# Patient Record
Sex: FEMALE | Race: BLACK OR AFRICAN AMERICAN | NOT HISPANIC OR LATINO | Employment: UNEMPLOYED | URBAN - METROPOLITAN AREA
[De-identification: names, ages, dates, MRNs, and addresses within clinical notes are randomized per-mention and may not be internally consistent; named-entity substitution may affect disease eponyms.]

---

## 2017-06-12 ENCOUNTER — OFFICE VISIT (OUTPATIENT)
Dept: INTERNAL MEDICINE | Facility: CLINIC | Age: 57
End: 2017-06-12

## 2017-06-12 VITALS
SYSTOLIC BLOOD PRESSURE: 124 MMHG | WEIGHT: 182 LBS | OXYGEN SATURATION: 100 % | HEART RATE: 81 BPM | DIASTOLIC BLOOD PRESSURE: 86 MMHG | RESPIRATION RATE: 20 BRPM | HEIGHT: 61 IN | BODY MASS INDEX: 34.36 KG/M2

## 2017-06-12 DIAGNOSIS — G89.29 CHRONIC EPIGASTRIC PAIN: ICD-10-CM

## 2017-06-12 DIAGNOSIS — N17.9 ACUTE KIDNEY INJURY (HCC): Primary | ICD-10-CM

## 2017-06-12 DIAGNOSIS — R10.13 CHRONIC EPIGASTRIC PAIN: ICD-10-CM

## 2017-06-12 PROBLEM — E11.9 TYPE 2 DIABETES MELLITUS (HCC): Status: ACTIVE | Noted: 2017-06-12

## 2017-06-12 PROBLEM — I25.10 CORONARY ARTERY DISEASE: Status: ACTIVE | Noted: 2017-06-12

## 2017-06-12 LAB
ALBUMIN SERPL-MCNC: 4.6 G/DL (ref 3.2–4.8)
ALBUMIN/GLOB SERPL: 1.7 G/DL (ref 1.5–2.5)
ALP SERPL-CCNC: 76 U/L (ref 25–100)
ALT SERPL W P-5'-P-CCNC: 33 U/L (ref 7–40)
ANION GAP SERPL CALCULATED.3IONS-SCNC: 6 MMOL/L (ref 3–11)
AST SERPL-CCNC: 28 U/L (ref 0–33)
BILIRUB SERPL-MCNC: 0.3 MG/DL (ref 0.3–1.2)
BUN BLD-MCNC: 17 MG/DL (ref 9–23)
BUN/CREAT SERPL: 18.9 (ref 7–25)
CALCIUM SPEC-SCNC: 10.7 MG/DL (ref 8.7–10.4)
CHLORIDE SERPL-SCNC: 107 MMOL/L (ref 99–109)
CO2 SERPL-SCNC: 29 MMOL/L (ref 20–31)
CREAT BLD-MCNC: 0.9 MG/DL (ref 0.6–1.3)
GFR SERPL CREATININE-BSD FRML MDRD: 78 ML/MIN/1.73
GLOBULIN UR ELPH-MCNC: 2.7 GM/DL
GLUCOSE BLD-MCNC: 57 MG/DL (ref 70–100)
LIPASE SERPL-CCNC: 41 U/L (ref 6–51)
POTASSIUM BLD-SCNC: 4.3 MMOL/L (ref 3.5–5.5)
PROT SERPL-MCNC: 7.3 G/DL (ref 5.7–8.2)
SODIUM BLD-SCNC: 142 MMOL/L (ref 132–146)

## 2017-06-12 PROCEDURE — 83690 ASSAY OF LIPASE: CPT | Performed by: FAMILY MEDICINE

## 2017-06-12 PROCEDURE — 80053 COMPREHEN METABOLIC PANEL: CPT | Performed by: FAMILY MEDICINE

## 2017-06-12 PROCEDURE — 99204 OFFICE O/P NEW MOD 45 MIN: CPT | Performed by: FAMILY MEDICINE

## 2017-06-12 RX ORDER — ISOSORBIDE MONONITRATE 30 MG/1
30 TABLET, EXTENDED RELEASE ORAL DAILY
COMMUNITY
End: 2018-02-26 | Stop reason: DRUGHIGH

## 2017-06-12 RX ORDER — INSULIN LISPRO 100 [IU]/ML
INJECTION, SUSPENSION SUBCUTANEOUS
Refills: 5 | COMMUNITY
Start: 2017-04-20 | End: 2018-06-15 | Stop reason: SDUPTHER

## 2017-06-12 RX ORDER — PANTOPRAZOLE SODIUM 40 MG/1
40 TABLET, DELAYED RELEASE ORAL DAILY
Qty: 30 TABLET | Refills: 0 | Status: SHIPPED | OUTPATIENT
Start: 2017-06-12 | End: 2017-07-10 | Stop reason: SDUPTHER

## 2017-06-12 RX ORDER — ROSUVASTATIN CALCIUM 40 MG/1
TABLET, COATED ORAL
Refills: 3 | COMMUNITY
Start: 2017-05-16 | End: 2018-02-26

## 2017-06-12 RX ORDER — PANTOPRAZOLE SODIUM 40 MG/1
40 TABLET, DELAYED RELEASE ORAL DAILY
COMMUNITY
End: 2017-06-12 | Stop reason: SDUPTHER

## 2017-06-12 RX ORDER — SENNA AND DOCUSATE SODIUM 50; 8.6 MG/1; MG/1
1 TABLET, FILM COATED ORAL DAILY
COMMUNITY
End: 2017-06-12

## 2017-06-12 RX ORDER — CLOPIDOGREL BISULFATE 75 MG/1
TABLET ORAL
Refills: 2 | COMMUNITY
Start: 2017-05-16 | End: 2018-09-21 | Stop reason: SDUPTHER

## 2017-06-12 NOTE — PATIENT INSTRUCTIONS
It was nice meeting you today!  I look forward to getting to know you and helping you with your primary care needs.  I have ordered a gastroenterology referral to further evaluation your abdominal pain.  You will be called to set up an appointment with this specialist.  I have also ordered an abdominal ultrasound for you to evaluate your gallbladder and other organs.  You will be called to set up an appointment for this study.  Recommend that you restart Pantoprazole to see if this helps your abdominal pain.  Your medication has been electronically prescribed and will be at your pharmacy.  Please pick this up and take as directed.  Please keep any upcoming appointments that you may have with your endocrinologist and cardiologist.  Recommend that you sign up for My Chart (our patient portal).  You will be able to access lab results, request appointments and send our office messages.  If you are interested, please ask for My Chart access information at the check out desk.  Please schedule an appointment for your annual physical exam (if you have not already had one for this year) at your earliest convenience.  Please follow-up as indicated.  Return to the clinic sooner if your symptoms worsen or if you have any other concerns.

## 2017-06-12 NOTE — PROGRESS NOTES
Subjective   Jayna Ambrocio is a 56 y.o. female who presents to the clinic to Establish Care    History of Present Illness  Denies having a recent PCP.  Specialists include: Dr. Naik (cardiologist at ), Dr. Jeannette Hall (endocrinologist at )  Prescription medications include: Aspirin, Plavix, Isosorbide mononitrate, Crestor, Humalog 75/25 (15 units BID)  OTC medications include: None    Patient presents to establish care and to follow-up after hospitalization at  from 04/28/17 and 05/04/17 for chronic epigastric pain and acute kidney injury secondary to acute gastroenteritis.  Was reportedly treated with IV fluids and anti-emetics.  Did not require hemodialysis.  Denies seeing a GI specialist in the hospital.    Since discharge from the hospital, patient reports that she continues to have intermittent epigastric pain, but not as severe as prior to her hospital stay.  Associated with intermittent diarrhea.  Pain worsens with drinking soda, sweets and fried foods, but patient has been trying to avoid these triggers.  Was prescribed a two week course of Pantoprazole and reports that she took it as directed and does not recall any significant improvement in her symptoms.  Has been avoiding NSAIDs and trying to use Tylenol as needed instead.  Denies melena, hematochezia, nausea, vomiting, fevers, sweats, chills, unintentional weight loss.    Review of Systems   Constitutional: Negative for appetite change, chills, fever and unexpected weight change.   HENT: Negative for congestion, ear pain, rhinorrhea, sinus pressure and sore throat.    Eyes: Negative for pain.   Respiratory: Negative for cough and shortness of breath.    Cardiovascular: Negative for chest pain and palpitations.   Gastrointestinal: Positive for abdominal pain (epigastric, chronic) and diarrhea (intermittent). Negative for blood in stool, constipation, nausea and vomiting.   Genitourinary: Negative for decreased urine volume, dysuria and  "hematuria.   Musculoskeletal: Negative for back pain and gait problem.   Skin: Negative for pallor and rash.   Neurological: Positive for headaches. Negative for dizziness and syncope.   Psychiatric/Behavioral: Negative for self-injury and suicidal ideas. The patient is not nervous/anxious.         Negative for depressed mood.     All other systems reviewed and are negative.    Past Medical History:   Diagnosis Date   • Arthritis     bilateral hands   • Coronary artery disease    • Diabetes mellitus    • Heart failure    • Migraines        Family History   Problem Relation Age of Onset   • Cancer Mother      Unsure what kind   • Diabetes Mother    • No Known Problems Father    • Diabetes Sister    • Cancer Sister      Unsure what kind   • Coronary artery disease Brother    • No Known Problems Maternal Aunt    • No Known Problems Maternal Uncle    • No Known Problems Paternal Aunt    • No Known Problems Paternal Uncle    • No Known Problems Maternal Grandmother    • No Known Problems Maternal Grandfather    • No Known Problems Paternal Grandmother    • No Known Problems Paternal Grandfather    • Diabetes Sister    • No Known Problems Daughter    • Diabetes Son    • No Known Problems Son        Past Surgical History:   Procedure Laterality Date   • FOOT SURGERY Left 03/15/2012       Social History     Social History   • Marital status: Single     Spouse name: N/A   • Number of children: 3   • Years of education: N/A     Occupational History   • Not on file.     Social History Main Topics   • Smoking status: Former Smoker     Types: Cigarettes     Start date: 1980     Quit date: 2014   • Smokeless tobacco: Never Used      Comment: \"1 pack could last 2 weeks\"   • Alcohol use No   • Drug use: No   • Sexual activity: Not Currently     Partners: Male     Other Topics Concern   • Not on file     Social History Narrative   • No narrative on file         Current Outpatient Prescriptions:   •  aspirin 81 MG tablet, Take 81 mg " "by mouth Daily., Disp: , Rfl:   •  clopidogrel (PLAVIX) 75 MG tablet, TAKE 1 TABLET BY MOUTH ONE TIME A DAY, Disp: , Rfl: 2  •  HUMALOG MIX 75/25 KWIKPEN (75-25) 100 UNIT/ML suspension pen-injector, INJECT 15 UNITS SUBCUTANEOUSLY TWICE DAILY, Disp: , Rfl: 5  •  isosorbide mononitrate (IMDUR) 30 MG 24 hr tablet, Take 30 mg by mouth Daily., Disp: , Rfl:   •  pantoprazole (PROTONIX) 40 MG EC tablet, Take 1 tablet by mouth Daily., Disp: 30 tablet, Rfl: 0  •  rosuvastatin (CRESTOR) 40 MG tablet, TAKE 1 TABLET BY MOUTH ONE TIME A DAY, Disp: , Rfl: 3    Objective   /86  Pulse 81  Resp 20  Ht 61\" (154.9 cm)  Wt 182 lb (82.6 kg)  SpO2 100%  BMI 34.39 kg/m2     Physical Exam   Constitutional: She is oriented to person, place, and time. She appears well-developed and well-nourished.   No acute distress.   HENT:   Head: Normocephalic and atraumatic.   Right Ear: External ear normal.   Left Ear: External ear normal.   Nose: Nose normal.   Mouth/Throat: Oropharynx is clear and moist.   Eyes: Conjunctivae and EOM are normal. Pupils are equal, round, and reactive to light.   Neck: Normal range of motion. Neck supple.   Cardiovascular: Normal rate, regular rhythm, normal heart sounds and intact distal pulses.    Pulmonary/Chest: Effort normal and breath sounds normal. No respiratory distress. She has no wheezes.   Abdominal: Soft. Bowel sounds are normal. There is tenderness (mild) in the epigastric area. There is no rigidity, no rebound, no guarding, no CVA tenderness and negative Swartz's sign.   Musculoskeletal: Normal range of motion.   Normal gait.   Neurological: She is alert and oriented to person, place, and time.   Skin: Skin is warm and dry.   Psychiatric: She has a normal mood and affect. Her behavior is normal.   Vitals reviewed.      Assessment/Plan   Jayna was seen today for establish care.    Diagnoses and all orders for this visit:    Acute kidney injury  -     Comprehensive Metabolic Panel    Reviewed " [facility] hospital records today.  Will recheck labs today and consider restarting Metformin and Lisinopril.  Advised patient to return to the clinic if their symptoms recur.    Chronic epigastric pain  -     Comprehensive Metabolic Panel  -     US Abdomen Complete  -     Ambulatory Referral to Gastroenterology  -     pantoprazole (PROTONIX) 40 MG EC tablet; Take 1 tablet by mouth Daily.  -     Lipase    Reviewed [facility] hospital records today.  The above labs were ordered today.  Will order a referral for gastroenterology today to evaluate for possible gastric ulcer(s).  Will also refill her Pantoprazole and order an abdominal ultrasound to evaluate for other causes of her symptoms today.  Advised patient to return to the clinic if their symptoms worsen prior to their specialist appointment.

## 2017-06-13 RX ORDER — LISINOPRIL 20 MG/1
TABLET ORAL
Refills: 5 | COMMUNITY
Start: 2017-04-06 | End: 2017-06-13 | Stop reason: SDUPTHER

## 2017-06-13 RX ORDER — LISINOPRIL 20 MG/1
20 TABLET ORAL DAILY
Qty: 30 TABLET | Refills: 5 | Status: SHIPPED | OUTPATIENT
Start: 2017-06-13 | End: 2017-12-12 | Stop reason: SDUPTHER

## 2017-06-21 ENCOUNTER — HOSPITAL ENCOUNTER (OUTPATIENT)
Dept: ULTRASOUND IMAGING | Facility: HOSPITAL | Age: 57
Discharge: HOME | End: 2017-06-21
Admitting: FAMILY MEDICINE

## 2017-06-21 PROCEDURE — 76700 US EXAM ABDOM COMPLETE: CPT

## 2017-07-10 DIAGNOSIS — R10.13 CHRONIC EPIGASTRIC PAIN: ICD-10-CM

## 2017-07-10 DIAGNOSIS — G89.29 CHRONIC EPIGASTRIC PAIN: ICD-10-CM

## 2017-07-10 RX ORDER — PANTOPRAZOLE SODIUM 40 MG/1
TABLET, DELAYED RELEASE ORAL
Qty: 30 TABLET | Refills: 0 | Status: SHIPPED | OUTPATIENT
Start: 2017-07-10 | End: 2017-08-17 | Stop reason: SDUPTHER

## 2017-08-17 DIAGNOSIS — G89.29 CHRONIC EPIGASTRIC PAIN: ICD-10-CM

## 2017-08-17 DIAGNOSIS — R10.13 CHRONIC EPIGASTRIC PAIN: ICD-10-CM

## 2017-08-18 RX ORDER — PANTOPRAZOLE SODIUM 40 MG/1
TABLET, DELAYED RELEASE ORAL
Qty: 30 TABLET | Refills: 4 | Status: SHIPPED | OUTPATIENT
Start: 2017-08-18 | End: 2018-01-22 | Stop reason: SDUPTHER

## 2017-08-30 ENCOUNTER — OFFICE VISIT (OUTPATIENT)
Dept: GASTROENTEROLOGY | Facility: CLINIC | Age: 57
End: 2017-08-30

## 2017-08-30 VITALS
HEIGHT: 61 IN | WEIGHT: 183.4 LBS | HEART RATE: 72 BPM | TEMPERATURE: 97.6 F | OXYGEN SATURATION: 97 % | BODY MASS INDEX: 34.63 KG/M2 | SYSTOLIC BLOOD PRESSURE: 132 MMHG | DIASTOLIC BLOOD PRESSURE: 84 MMHG

## 2017-08-30 DIAGNOSIS — R10.13 EPIGASTRIC PAIN: Primary | ICD-10-CM

## 2017-08-30 PROCEDURE — 99204 OFFICE O/P NEW MOD 45 MIN: CPT | Performed by: INTERNAL MEDICINE

## 2017-08-30 NOTE — PROGRESS NOTES
PCP: Viviana Calvert MD    Chief Complaint   Patient presents with   • Abdominal Pain       History of Present Illness:   HPI  Ms. Ambrocio is a 57 yo with a history of coronary disease, diabetes and arthritis.  The patient states that about 3-4 years ago she had a heart catheterization with stent placement.  She is followed by a cardiologist at the [facility].  She is continue the Plavix medication on a daily basis.  Ms. Ambrocio was admitted to [facility] a few months ago with upper abdominal pain.  The pain had been an issue for about one year. She says the pain is crampy without radiation.  She has not truly related the pain to meals.  She admits that before the hospitalization at the [facility] she was taking over 5 Aleve tablets daily.  The patient was experiencing at that time nausea but no saray vomiting.  She is not taking any nonsteroidal medication at this time.  She denies any change in bowel habits there is no history of gastrointestinal bleeding.  There is no history of night sweats, fever or chills.  The patient denies any unexplained weight loss.  Past Medical History:   Diagnosis Date   • Arthritis     bilateral hands   • Coronary artery disease    • Diabetes mellitus    • Heart failure    • Migraines        Past Surgical History:   Procedure Laterality Date   • FOOT SURGERY Left 03/15/2012         Current Outpatient Prescriptions:   •  aspirin 81 MG tablet, Take 81 mg by mouth Daily., Disp: , Rfl:   •  clopidogrel (PLAVIX) 75 MG tablet, TAKE 1 TABLET BY MOUTH ONE TIME A DAY, Disp: , Rfl: 2  •  HUMALOG MIX 75/25 KWIKPEN (75-25) 100 UNIT/ML suspension pen-injector, INJECT 15 UNITS SUBCUTANEOUSLY TWICE DAILY, Disp: , Rfl: 5  •  isosorbide mononitrate (IMDUR) 30 MG 24 hr tablet, Take 30 mg by mouth Daily., Disp: , Rfl:   •  lisinopril (PRINIVIL,ZESTRIL) 20 MG tablet, Take 1 tablet by mouth Daily., Disp: 30 tablet, Rfl: 5  •  pantoprazole (PROTONIX) 40 MG EC tablet, TAKE 1  "TABLET BY MOUTH ONE TIME A DAY , Disp: 30 tablet, Rfl: 4  •  rosuvastatin (CRESTOR) 40 MG tablet, TAKE 1 TABLET BY MOUTH ONE TIME A DAY, Disp: , Rfl: 3    No Known Allergies    Family History   Problem Relation Age of Onset   • Cancer Mother      Unsure what kind   • Diabetes Mother    • No Known Problems Father    • Diabetes Sister    • Cancer Sister      Unsure what kind   • Coronary artery disease Brother    • No Known Problems Maternal Aunt    • No Known Problems Maternal Uncle    • No Known Problems Paternal Aunt    • No Known Problems Paternal Uncle    • No Known Problems Maternal Grandmother    • No Known Problems Maternal Grandfather    • No Known Problems Paternal Grandmother    • No Known Problems Paternal Grandfather    • Diabetes Sister    • No Known Problems Daughter    • Diabetes Son    • No Known Problems Son        Social History     Social History   • Marital status: Single     Spouse name: N/A   • Number of children: 3   • Years of education: N/A     Occupational History   • Not on file.     Social History Main Topics   • Smoking status: Former Smoker     Types: Cigarettes     Start date: 1980     Quit date: 2014   • Smokeless tobacco: Never Used      Comment: \"1 pack could last 2 weeks\"   • Alcohol use No   • Drug use: No   • Sexual activity: Not Currently     Partners: Male     Other Topics Concern   • Not on file     Social History Narrative       Review of Systems   Constitutional: Negative for activity change, appetite change, fatigue, fever and unexpected weight change.   HENT: Negative for dental problem, hearing loss, mouth sores, postnasal drip, sneezing, trouble swallowing and voice change.    Eyes: Negative for pain, redness, itching and visual disturbance.   Respiratory: Negative for cough, choking, chest tightness, shortness of breath and wheezing.    Cardiovascular: Negative for chest pain, palpitations and leg swelling.   Gastrointestinal: Positive for abdominal pain. Negative for " abdominal distention, anal bleeding, blood in stool, constipation, diarrhea, nausea, rectal pain and vomiting.   Endocrine: Negative for cold intolerance, heat intolerance, polydipsia, polyphagia and polyuria.   Genitourinary: Negative.  Negative for dysuria, enuresis, flank pain, hematuria and urgency.   Musculoskeletal: Negative for arthralgias, back pain, gait problem, joint swelling and myalgias.   Skin: Negative for color change, pallor and rash.   Allergic/Immunologic: Negative for environmental allergies, food allergies and immunocompromised state.   Neurological: Negative for dizziness, tremors, seizures, facial asymmetry, speech difficulty, numbness and headaches.   Hematological: Negative for adenopathy.   Psychiatric/Behavioral: Negative for behavioral problems, confusion, dysphoric mood, hallucinations and self-injury.       Vitals:    08/30/17 1125   BP: 132/84   Pulse: 72   Temp: 97.6 °F (36.4 °C)   SpO2: 97%       Physical Exam   Constitutional: She is oriented to person, place, and time. She appears well-nourished. No distress.   HENT:   Head: Normocephalic and atraumatic.   Mouth/Throat: Oropharynx is clear and moist. No oropharyngeal exudate.   Eyes: EOM are normal. Pupils are equal, round, and reactive to light. No scleral icterus.   Neck: Normal range of motion. Neck supple. No thyromegaly present.   Cardiovascular: Normal rate and regular rhythm.  Exam reveals no gallop.    Murmur heard.  Pulmonary/Chest: Effort normal and breath sounds normal. She has no wheezes. She has no rales.   Abdominal: Soft. Bowel sounds are normal. She exhibits no distension. There is no tenderness. There is no rebound and no guarding.   Musculoskeletal: Normal range of motion. She exhibits no edema or tenderness.   Lymphadenopathy:     She has no cervical adenopathy.   Neurological: She is alert and oriented to person, place, and time. She exhibits normal muscle tone.   Skin: Skin is dry. No rash noted. No erythema.    Psychiatric: She has a normal mood and affect. Her behavior is normal. Thought content normal.   Vitals reviewed.      Jayna was seen today for abdominal pain.    Diagnoses and all orders for this visit:    Epigastric pain  -     External [facility] Surgical/Procedural Request; Future  -     Obtain informed consent; Standing   The patient certainly is at risk for nonsteroidal induced ulcers or erosions. She not experience any gross signs of gastrointestinal bleeding.  The patient has improved since stopping the nonsteroidal medication.    Hepatitis- the patient had a liver biopsy in 2006. The findings were possibly related to hepatitis C. The patient denies this history and does not recall any previous elevated liver panel tests.      Plan: Will proceed with an EGD for further evaluation.  I discussed the risks and benefits of the procedure and she agrees to proceed.      I spent over 50% of the office visit counseling and answering questions from the patient.

## 2017-11-14 ENCOUNTER — OUTSIDE FACILITY SERVICE (OUTPATIENT)
Dept: GASTROENTEROLOGY | Facility: CLINIC | Age: 57
End: 2017-11-14

## 2017-11-14 ENCOUNTER — LAB REQUISITION (OUTPATIENT)
Dept: LAB | Facility: HOSPITAL | Age: 57
End: 2017-11-14

## 2017-11-14 DIAGNOSIS — K29.70 GASTRITIS WITHOUT BLEEDING: ICD-10-CM

## 2017-11-14 DIAGNOSIS — R10.13 EPIGASTRIC PAIN: ICD-10-CM

## 2017-11-14 DIAGNOSIS — K44.9 DIAPHRAGMATIC HERNIA WITHOUT OBSTRUCTION OR GANGRENE: ICD-10-CM

## 2017-11-14 DIAGNOSIS — K20.90 ESOPHAGITIS: ICD-10-CM

## 2017-11-14 PROCEDURE — 43239 EGD BIOPSY SINGLE/MULTIPLE: CPT | Performed by: INTERNAL MEDICINE

## 2017-11-14 PROCEDURE — 88305 TISSUE EXAM BY PATHOLOGIST: CPT | Performed by: INTERNAL MEDICINE

## 2017-11-15 LAB
CYTO UR: NORMAL
LAB AP CASE REPORT: NORMAL
LAB AP CLINICAL INFORMATION: NORMAL
Lab: NORMAL
PATH REPORT.FINAL DX SPEC: NORMAL
PATH REPORT.GROSS SPEC: NORMAL

## 2017-11-16 ENCOUNTER — TELEPHONE (OUTPATIENT)
Dept: GASTROENTEROLOGY | Facility: CLINIC | Age: 57
End: 2017-11-16

## 2017-11-16 DIAGNOSIS — A04.8 BACTERIAL INFECTION DUE TO HELICOBACTER PYLORI: Primary | ICD-10-CM

## 2017-11-16 RX ORDER — CLARITHROMYCIN 500 MG/1
500 TABLET, COATED ORAL 2 TIMES DAILY
Qty: 28 TABLET | Refills: 0 | Status: SHIPPED | OUTPATIENT
Start: 2017-11-16 | End: 2017-12-12

## 2017-11-16 RX ORDER — AMOXICILLIN 500 MG/1
1000 CAPSULE ORAL 2 TIMES DAILY
Qty: 56 CAPSULE | Refills: 0 | Status: SHIPPED | OUTPATIENT
Start: 2017-11-16 | End: 2017-12-12

## 2017-11-16 NOTE — TELEPHONE ENCOUNTER
----- Message from Devin Leone MD sent at 11/16/2017 12:25 PM EST -----  Let Ms. Ambrocio know there was H. pylori on the stomach biopsies.  I did e-scribe the antibiotics for her to take for 14 days in addition to the Protonix daily.  Thank you,  TAQUERIA

## 2017-12-12 ENCOUNTER — OFFICE VISIT (OUTPATIENT)
Dept: INTERNAL MEDICINE | Facility: CLINIC | Age: 57
End: 2017-12-12

## 2017-12-12 VITALS
WEIGHT: 189 LBS | HEIGHT: 61 IN | SYSTOLIC BLOOD PRESSURE: 122 MMHG | RESPIRATION RATE: 18 BRPM | OXYGEN SATURATION: 100 % | DIASTOLIC BLOOD PRESSURE: 84 MMHG | BODY MASS INDEX: 35.68 KG/M2 | HEART RATE: 77 BPM

## 2017-12-12 DIAGNOSIS — I10 ESSENTIAL HYPERTENSION: Primary | ICD-10-CM

## 2017-12-12 DIAGNOSIS — Z12.31 ENCOUNTER FOR SCREENING MAMMOGRAM FOR BREAST CANCER: ICD-10-CM

## 2017-12-12 PROBLEM — G89.29 CHRONIC EPIGASTRIC PAIN: Status: RESOLVED | Noted: 2017-06-12 | Resolved: 2017-12-12

## 2017-12-12 PROBLEM — N17.9 ACUTE KIDNEY INJURY: Status: RESOLVED | Noted: 2017-06-12 | Resolved: 2017-12-12

## 2017-12-12 PROBLEM — R10.13 CHRONIC EPIGASTRIC PAIN: Status: RESOLVED | Noted: 2017-06-12 | Resolved: 2017-12-12

## 2017-12-12 PROCEDURE — 99213 OFFICE O/P EST LOW 20 MIN: CPT | Performed by: FAMILY MEDICINE

## 2017-12-12 RX ORDER — LISINOPRIL 20 MG/1
20 TABLET ORAL DAILY
Qty: 30 TABLET | Refills: 5 | Status: SHIPPED | OUTPATIENT
Start: 2017-12-12 | End: 2018-02-26

## 2017-12-12 NOTE — PATIENT INSTRUCTIONS
I have ordered a screening mammogram for you.  You will be called to set up an appointment for this study.  I have refilled your Lisinopril.  Your medication has been electronically prescribed and will be at your pharmacy.  Please pick it up and take as directed.  Please follow-up as indicated.  Return to the clinic sooner if you have any other concerns.

## 2017-12-12 NOTE — PROGRESS NOTES
"She Abmrocio is a 57 y.o. female who presents to follow-up for Hypertension      History of Present Illness   She was last seen in the office on 06/12/17 to establish care and for abdominal pain management.  Previous intervention/treatment includes: referred to GI.    Patient is here to follow-up for chronic hypertension.  She is not exercising and is adherent to a low-salt diet.  She does check her blood pressure at home.  It is well controlled at home.  She denies chest pain, chest pressure/discomfort, cough, dyspnea, near-syncope, orthopnea, palpitations and syncope.  Cardiovascular risk factors: diabetes mellitus, dyslipidemia, hypertension and obesity (BMI >= 30 kg/m2).  She is compliant with her medication.  Denies intolerable side effects to her medication.  She does not smoke.     Patient is also requesting a referral for a screening mammogram.  Last mammogram was reportedly \"a long time ago\".  Has been doing self breast exams at home on occasion and denies any concerning symptoms including breast lumps, breast pain, nipple discharge, skin changes, axillary lumps, fevers, sweats, chills.  States that her sister was recently diagnosed with breast cancer at age 63.      Review of Systems   Constitutional: Negative for chills and fever.   Respiratory: Negative for cough, shortness of breath and wheezing.    Cardiovascular: Negative for chest pain and palpitations.   Gastrointestinal: Negative for abdominal pain, constipation, diarrhea, nausea and vomiting.   Neurological: Negative for dizziness, syncope and headaches.     The following portions of the patient's history were reviewed and updated as appropriate: current medications, past family history, past medical history, past social history and problem list.    Objective   /84  Pulse 77  Resp 18  Ht 154.9 cm (61\")  Wt 85.7 kg (189 lb)  SpO2 100%  BMI 35.71 kg/m2     Physical Exam   Constitutional: She is oriented to person, place, " and time. She appears well-developed and well-nourished.   No acute distress.   HENT:   Head: Normocephalic and atraumatic.   Eyes: Conjunctivae and EOM are normal.   Neck: Normal range of motion.   Cardiovascular: Normal rate, regular rhythm, normal heart sounds and intact distal pulses.    Pulmonary/Chest: Effort normal and breath sounds normal. She has no wheezes.   Breast exam deferred.   Abdominal: Soft. Bowel sounds are normal. There is no tenderness.   Musculoskeletal: Normal range of motion.   Moves all extremities.   Neurological: She is alert and oriented to person, place, and time.   Skin: Skin is warm and dry.   Psychiatric: She has a normal mood and affect. Her behavior is normal.   Vitals reviewed.      Assessment/Plan   Jayna was seen today for hypertension.    Diagnoses and all orders for this visit:    Essential hypertension    Controlled.  Blood pressure today in office was 122/84.  Will continue current regimen today.  A refill for her medication was requested today.  Advised patient to return to the clinic in 6 months for next routine follow-up or sooner if needed.    Encounter for screening mammogram for breast cancer  -     Mammo Screening Digital Tomosynthesis Bilateral With CAD

## 2018-01-20 DIAGNOSIS — G89.29 CHRONIC EPIGASTRIC PAIN: ICD-10-CM

## 2018-01-20 DIAGNOSIS — R10.13 CHRONIC EPIGASTRIC PAIN: ICD-10-CM

## 2018-01-20 RX ORDER — PANTOPRAZOLE SODIUM 40 MG/1
TABLET, DELAYED RELEASE ORAL
Qty: 30 TABLET | Refills: 3 | Status: CANCELLED | OUTPATIENT
Start: 2018-01-20

## 2018-01-22 DIAGNOSIS — G89.29 CHRONIC EPIGASTRIC PAIN: ICD-10-CM

## 2018-01-22 DIAGNOSIS — R10.13 CHRONIC EPIGASTRIC PAIN: ICD-10-CM

## 2018-01-22 RX ORDER — PANTOPRAZOLE SODIUM 40 MG/1
40 TABLET, DELAYED RELEASE ORAL DAILY
Qty: 30 TABLET | Refills: 4 | Status: SHIPPED | OUTPATIENT
Start: 2018-01-22 | End: 2018-06-15 | Stop reason: SDUPTHER

## 2018-02-11 ENCOUNTER — LAB REQUISITION (OUTPATIENT)
Dept: LAB | Facility: HOSPITAL | Age: 58
End: 2018-02-11

## 2018-02-11 DIAGNOSIS — Z00.00 ROUTINE GENERAL MEDICAL EXAMINATION AT A HEALTH CARE FACILITY: ICD-10-CM

## 2018-02-11 LAB
ANION GAP SERPL CALCULATED.3IONS-SCNC: 9 MMOL/L (ref 3–11)
BASOPHILS # BLD AUTO: 0.04 10*3/MM3 (ref 0–0.2)
BASOPHILS NFR BLD AUTO: 0.3 % (ref 0–1)
BUN BLD-MCNC: 13 MG/DL (ref 9–23)
BUN/CREAT SERPL: 9.3 (ref 7–25)
CALCIUM SPEC-SCNC: 8.9 MG/DL (ref 8.7–10.4)
CHLORIDE SERPL-SCNC: 95 MMOL/L (ref 99–109)
CO2 SERPL-SCNC: 32 MMOL/L (ref 20–31)
CREAT BLD-MCNC: 1.4 MG/DL (ref 0.6–1.3)
CRP SERPL-MCNC: 7.6 MG/DL (ref 0–1)
DEPRECATED RDW RBC AUTO: 51.7 FL (ref 37–54)
EOSINOPHIL # BLD AUTO: 0.19 10*3/MM3 (ref 0–0.3)
EOSINOPHIL NFR BLD AUTO: 1.5 % (ref 0–3)
ERYTHROCYTE [DISTWIDTH] IN BLOOD BY AUTOMATED COUNT: 15.3 % (ref 11.3–14.5)
GFR SERPL CREATININE-BSD FRML MDRD: 47 ML/MIN/1.73
GLUCOSE BLD-MCNC: 219 MG/DL (ref 70–100)
HCT VFR BLD AUTO: 32.6 % (ref 34.5–44)
HGB BLD-MCNC: 10.2 G/DL (ref 11.5–15.5)
IMM GRANULOCYTES # BLD: 0.09 10*3/MM3 (ref 0–0.03)
IMM GRANULOCYTES NFR BLD: 0.7 % (ref 0–0.6)
LYMPHOCYTES # BLD AUTO: 1.43 10*3/MM3 (ref 0.6–4.8)
LYMPHOCYTES NFR BLD AUTO: 11.2 % (ref 24–44)
MCH RBC QN AUTO: 29 PG (ref 27–31)
MCHC RBC AUTO-ENTMCNC: 31.3 G/DL (ref 32–36)
MCV RBC AUTO: 92.6 FL (ref 80–99)
MONOCYTES # BLD AUTO: 1.24 10*3/MM3 (ref 0–1)
MONOCYTES NFR BLD AUTO: 9.7 % (ref 0–12)
NEUTROPHILS # BLD AUTO: 9.82 10*3/MM3 (ref 1.5–8.3)
NEUTROPHILS NFR BLD AUTO: 76.6 % (ref 41–71)
PLATELET # BLD AUTO: 296 10*3/MM3 (ref 150–450)
PMV BLD AUTO: 10.2 FL (ref 6–12)
POTASSIUM BLD-SCNC: 3.9 MMOL/L (ref 3.5–5.5)
RBC # BLD AUTO: 3.52 10*6/MM3 (ref 3.89–5.14)
SODIUM BLD-SCNC: 136 MMOL/L (ref 132–146)
TROPONIN I SERPL-MCNC: 0.13 NG/ML
WBC NRBC COR # BLD: 12.81 10*3/MM3 (ref 3.5–10.8)

## 2018-02-11 PROCEDURE — 85025 COMPLETE CBC W/AUTO DIFF WBC: CPT

## 2018-02-11 PROCEDURE — 86140 C-REACTIVE PROTEIN: CPT

## 2018-02-11 PROCEDURE — 85025 COMPLETE CBC W/AUTO DIFF WBC: CPT | Performed by: PHYSICAL MEDICINE & REHABILITATION

## 2018-02-11 PROCEDURE — 80048 BASIC METABOLIC PNL TOTAL CA: CPT | Performed by: PHYSICAL MEDICINE & REHABILITATION

## 2018-02-11 PROCEDURE — 80048 BASIC METABOLIC PNL TOTAL CA: CPT

## 2018-02-11 PROCEDURE — 84484 ASSAY OF TROPONIN QUANT: CPT

## 2018-02-11 PROCEDURE — 84484 ASSAY OF TROPONIN QUANT: CPT | Performed by: PHYSICAL MEDICINE & REHABILITATION

## 2018-02-11 PROCEDURE — 86140 C-REACTIVE PROTEIN: CPT | Performed by: PHYSICAL MEDICINE & REHABILITATION

## 2018-02-20 ENCOUNTER — TRANSITIONAL CARE MANAGEMENT TELEPHONE ENCOUNTER (OUTPATIENT)
Dept: INTERNAL MEDICINE | Facility: CLINIC | Age: 58
End: 2018-02-20

## 2018-02-20 NOTE — OUTREACH NOTE
VAUGHN call completed.  Please refer to TCM call flowsheet for call documentation.  I spoke with [facility] and they will see patient today.  They did see patient yesterday.  Patient did seem to be easily confused by medication list.  I discussed medications with her in detail.

## 2018-02-22 ENCOUNTER — APPOINTMENT (OUTPATIENT)
Dept: MAMMOGRAPHY | Facility: HOSPITAL | Age: 58
End: 2018-02-22

## 2018-02-26 ENCOUNTER — OFFICE VISIT (OUTPATIENT)
Dept: INTERNAL MEDICINE | Facility: CLINIC | Age: 58
End: 2018-02-26

## 2018-02-26 VITALS
BODY MASS INDEX: 34.36 KG/M2 | WEIGHT: 182 LBS | SYSTOLIC BLOOD PRESSURE: 120 MMHG | HEIGHT: 61 IN | HEART RATE: 68 BPM | DIASTOLIC BLOOD PRESSURE: 64 MMHG

## 2018-02-26 DIAGNOSIS — M62.838 MUSCLE SPASM: ICD-10-CM

## 2018-02-26 DIAGNOSIS — E11.59 TYPE 2 DIABETES MELLITUS WITH OTHER CIRCULATORY COMPLICATION, WITH LONG-TERM CURRENT USE OF INSULIN (HCC): ICD-10-CM

## 2018-02-26 DIAGNOSIS — Z95.1 S/P CABG X 4: Primary | ICD-10-CM

## 2018-02-26 DIAGNOSIS — Z79.4 TYPE 2 DIABETES MELLITUS WITH OTHER CIRCULATORY COMPLICATION, WITH LONG-TERM CURRENT USE OF INSULIN (HCC): ICD-10-CM

## 2018-02-26 DIAGNOSIS — N28.9 ACUTE RENAL INSUFFICIENCY: ICD-10-CM

## 2018-02-26 DIAGNOSIS — D62 POSTOPERATIVE ANEMIA DUE TO ACUTE BLOOD LOSS: ICD-10-CM

## 2018-02-26 LAB
ALBUMIN SERPL-MCNC: 4.2 G/DL (ref 3.2–4.8)
ALBUMIN/GLOB SERPL: 1.4 G/DL (ref 1.5–2.5)
ALP SERPL-CCNC: 79 U/L (ref 25–100)
ALT SERPL W P-5'-P-CCNC: 16 U/L (ref 7–40)
ANION GAP SERPL CALCULATED.3IONS-SCNC: 12 MMOL/L (ref 3–11)
AST SERPL-CCNC: 20 U/L (ref 0–33)
BASOPHILS # BLD AUTO: 0.05 10*3/MM3 (ref 0–0.2)
BASOPHILS NFR BLD AUTO: 0.7 % (ref 0–1)
BILIRUB SERPL-MCNC: 0.6 MG/DL (ref 0.3–1.2)
BUN BLD-MCNC: 13 MG/DL (ref 9–23)
BUN/CREAT SERPL: 13 (ref 7–25)
CALCIUM SPEC-SCNC: 9.4 MG/DL (ref 8.7–10.4)
CHLORIDE SERPL-SCNC: 98 MMOL/L (ref 99–109)
CO2 SERPL-SCNC: 32 MMOL/L (ref 20–31)
CREAT BLD-MCNC: 1 MG/DL (ref 0.6–1.3)
DEPRECATED RDW RBC AUTO: 51.2 FL (ref 37–54)
EOSINOPHIL # BLD AUTO: 0.22 10*3/MM3 (ref 0–0.3)
EOSINOPHIL NFR BLD AUTO: 2.9 % (ref 0–3)
ERYTHROCYTE [DISTWIDTH] IN BLOOD BY AUTOMATED COUNT: 15.1 % (ref 11.3–14.5)
GFR SERPL CREATININE-BSD FRML MDRD: 69 ML/MIN/1.73
GLOBULIN UR ELPH-MCNC: 3 GM/DL
GLUCOSE BLD-MCNC: 93 MG/DL (ref 70–100)
HBA1C MFR BLD: 7 % (ref 4.8–5.6)
HCT VFR BLD AUTO: 35.4 % (ref 34.5–44)
HGB BLD-MCNC: 10.8 G/DL (ref 11.5–15.5)
IMM GRANULOCYTES # BLD: 0.02 10*3/MM3 (ref 0–0.03)
IMM GRANULOCYTES NFR BLD: 0.3 % (ref 0–0.6)
LYMPHOCYTES # BLD AUTO: 1.57 10*3/MM3 (ref 0.6–4.8)
LYMPHOCYTES NFR BLD AUTO: 20.5 % (ref 24–44)
MCH RBC QN AUTO: 28.1 PG (ref 27–31)
MCHC RBC AUTO-ENTMCNC: 30.5 G/DL (ref 32–36)
MCV RBC AUTO: 91.9 FL (ref 80–99)
MONOCYTES # BLD AUTO: 0.82 10*3/MM3 (ref 0–1)
MONOCYTES NFR BLD AUTO: 10.7 % (ref 0–12)
NEUTROPHILS # BLD AUTO: 4.99 10*3/MM3 (ref 1.5–8.3)
NEUTROPHILS NFR BLD AUTO: 64.9 % (ref 41–71)
PLATELET # BLD AUTO: 723 10*3/MM3 (ref 150–450)
PMV BLD AUTO: 9.3 FL (ref 6–12)
POTASSIUM BLD-SCNC: 4.7 MMOL/L (ref 3.5–5.5)
PROT SERPL-MCNC: 7.2 G/DL (ref 5.7–8.2)
RBC # BLD AUTO: 3.85 10*6/MM3 (ref 3.89–5.14)
SODIUM BLD-SCNC: 142 MMOL/L (ref 132–146)
WBC NRBC COR # BLD: 7.67 10*3/MM3 (ref 3.5–10.8)

## 2018-02-26 PROCEDURE — 99495 TRANSJ CARE MGMT MOD F2F 14D: CPT | Performed by: FAMILY MEDICINE

## 2018-02-26 PROCEDURE — 85025 COMPLETE CBC W/AUTO DIFF WBC: CPT | Performed by: FAMILY MEDICINE

## 2018-02-26 PROCEDURE — 83036 HEMOGLOBIN GLYCOSYLATED A1C: CPT | Performed by: FAMILY MEDICINE

## 2018-02-26 PROCEDURE — 80053 COMPREHEN METABOLIC PANEL: CPT | Performed by: FAMILY MEDICINE

## 2018-02-26 RX ORDER — METHOCARBAMOL 500 MG/1
500 TABLET, FILM COATED ORAL EVERY 8 HOURS PRN
COMMUNITY
End: 2018-02-26 | Stop reason: SDUPTHER

## 2018-02-26 RX ORDER — ATORVASTATIN CALCIUM 40 MG/1
40 TABLET, FILM COATED ORAL NIGHTLY
COMMUNITY
End: 2018-06-18 | Stop reason: DRUGHIGH

## 2018-02-26 RX ORDER — TRAMADOL HYDROCHLORIDE 50 MG/1
50 TABLET ORAL EVERY 4 HOURS PRN
COMMUNITY
End: 2019-08-09

## 2018-02-26 RX ORDER — ISOSORBIDE MONONITRATE 60 MG/1
60 TABLET, EXTENDED RELEASE ORAL EVERY MORNING
COMMUNITY
End: 2018-09-21 | Stop reason: SDUPTHER

## 2018-02-26 RX ORDER — AMIODARONE HYDROCHLORIDE 200 MG/1
200 TABLET ORAL DAILY
COMMUNITY
End: 2018-09-21 | Stop reason: SDUPTHER

## 2018-02-26 RX ORDER — METHOCARBAMOL 500 MG/1
500 TABLET, FILM COATED ORAL EVERY 8 HOURS PRN
Qty: 90 TABLET | Refills: 0 | Status: SHIPPED | OUTPATIENT
Start: 2018-02-26 | End: 2018-05-08 | Stop reason: SDUPTHER

## 2018-02-26 RX ORDER — FUROSEMIDE 40 MG/1
40 TABLET ORAL DAILY
COMMUNITY
End: 2018-09-21 | Stop reason: SDUPTHER

## 2018-02-26 NOTE — PROGRESS NOTES
Transitional Care Follow Up Visit  Subjective     Jayna Ambrocio is a 57 y.o. female who presents for a transitional care management visit.    Within 48 business hours after discharge our office contacted her via telephone to coordinate her care and needs.      I reviewed and discussed the details of that call along with the discharge summary, hospital problems, inpatient lab results, inpatient diagnostic studies, and consultation reports with Jayna.     Current outpatient and discharge medications have been reconciled for the patient.    Date of TCM Phone Call 2/20/2018   [facility]    Date of Admission 2/9/2018   Date of Discharge 2/19/2018   Discharge Disposition Home or Self Care     Risk for Readmission (LACE) No Data Recorded    History of Present Illness   Course During Hospital Stay:  Patient presents to follow-up after recent hospitalization at [facility] from 01/29/18 until 02/09/18.  Patient was initially admitted for further evaluation of significant lower extremity claudication symptoms.  Had a lower extremity angiogram done, which showed diffuse small vessel disease.  No intervention was recommended for this issue at time of her hospital stay.  Patient then developed significant chest pain associated with nonspecific EKG changes.  LHC was done, which showed multivessel CAD.  Patient continued to have persistent chest pain and was started on a nitroglycerin drip.  CT surgery was consulted for CABG evaluation.  Due to patient's persistent symptoms, it was decided to proceed with CABG during her hospital stay.  Four vessel CABG was performed on 02/01/18.  Post-operatively, patient developed atrial fibrillation with RVR, acute respiratory failure (requiring supplemental oxygen via nasal canula) as well as acute blood loss anemia and thrombocytopenia.  At time of hospital discharge, patient was started on Amiodarone and converted back to NSR, weaned off supplemental oxygen to room air and her  hemoglobin and hematocrit had improved.  Platelet count had also normalized.  Patient was discharged to [facility] on 02/09/18.    At [facility], patient was evaluated by PT, OT and speech therapy.  Patient participated in 3 hours of therapy, 5 days a week and made significant progress.  Post-CABG care was done during her rehab stay and patient was continued on her remaining chronic medications.  Hemoglobin and hematocrit were noted to be 10.3 and 31.8 on 02/08/18.  Creatinine had trended up during her rehab stay so Metformin was held and patient was continued on her 75/25 insulin and started on a sliding scale insulin.  Hemoglobin and hematocrit were 9.8 and 31.6 and creatinine was 1.13 on 02/19/18.      Since discharge from the hospital and rehab facility, patient reports continued progress in her recovery.  Has been taking her medications as directed and denies any intolerable side effects.  Reports having intermittent pain at her surgical incision site, but admits that she tries to use her pain medication every 8 hours as oppose to every 4 hours.  Has been checking her blood sugars 1-2 times a day.  Blood sugar range has reportedly been between .  Has follow-up appointments scheduled with CT surgery on 03/02/18 and cardiology on 03/20/18.  Denies dyspnea, wheezing, fevers, sweats, chills, abdominal pain, nausea, vomiting, diarrhea, constipation.    Current outpatient and discharge medications have been reconciled for the patient.  Viviana Calvert MD    The following portions of the patient's history were reviewed and updated as appropriate: current medications, past medical history, past surgical history and problem list.    Review of Systems   Constitutional: Negative for chills and fever.   Respiratory: Negative for cough, shortness of breath and wheezing.    Cardiovascular: Negative for chest pain and palpitations.   Gastrointestinal: Negative for abdominal pain, constipation,  "diarrhea, nausea and vomiting.   Musculoskeletal:        Positive for post-surgical chest wall pain.   Skin: Negative for color change and wound.   Neurological: Negative for dizziness, syncope and headaches.     Objective    /64 (BP Location: Left arm, Patient Position: Sitting)  Pulse 68  Ht 154.9 cm (60.98\")  Wt 82.6 kg (182 lb)  BMI 34.41 kg/m2    Physical Exam   Constitutional: She is oriented to person, place, and time. She appears well-developed and well-nourished.   No acute distress.   HENT:   Head: Normocephalic and atraumatic.   Eyes: Conjunctivae and EOM are normal.   Neck: Normal range of motion.   Cardiovascular: Normal rate, regular rhythm, normal heart sounds and intact distal pulses.    1+ pitting edema noted in right lower extremity.   Pulmonary/Chest: Effort normal and breath sounds normal. No respiratory distress. She has no wheezes.   Abdominal: Soft. Bowel sounds are normal. There is no tenderness.   Musculoskeletal: Normal range of motion.   Moves all extremities.   Neurological: She is alert and oriented to person, place, and time.   Skin: Skin is warm and dry.   Healing incision sites noted on right leg.   Psychiatric: She has a normal mood and affect. Her behavior is normal. Judgment and thought content normal.   Vitals reviewed.      Assessment/Plan   Jayna was seen today for coronary artery disease.    Diagnoses and all orders for this visit:    S/P CABG x [address] [place] [address] and [facility] records were reviewed today.  Recommended that patient keep her upcoming appointments with her cardiologist and CT surgeon for further management.    Postoperative anemia due to acute blood loss  -     CBC & Differential  -     CBC Auto Differential    The above labs were ordered today.  Will start patient on an iron supplement if lab results indicate.    Acute renal insufficiency  -     Comprehensive Metabolic Panel    The above labs were ordered today.  Will consider restarting Metformin " versus make further medication adjustments if lab results indicate.    Muscle spasm  -     methocarbamol (ROBAXIN) 500 MG tablet; Take 1 tablet by mouth Every 8 (Eight) Hours As Needed for Muscle Spasms.    Recommended that patient discuss her narcotic pain medication management with her surgeon at her upcoming follow-up appointment.  Agreed to refill her muscle relaxant today.    Type 2 diabetes mellitus with other circulatory complication, with long-term current use of insulin  -     Hemoglobin A1c    The above labs were ordered today.  Will plan to adjust her current regimen if lab results indicate.  Advised patient to return to the clinic in 4 weeks for next routine follow-up or sooner if needed.

## 2018-02-26 NOTE — PATIENT INSTRUCTIONS
Please go to the lab before you leave to have your labs drawn.  You will be called with your results.  I have refilled your muscle relaxant.  Your new medication has been electronically prescribed and will be at your pharmacy.  Please pick it up and take as directed.  Please keep your upcoming appointments with Dr. Naik (03/20/18) and Dr. Shannon (03/02/18).  Please continue taking your other current medications as directed.  Please follow-up as indicated.  Return to the clinic sooner if your symptoms worsen or if you have any other concerns.

## 2018-03-26 ENCOUNTER — OFFICE VISIT (OUTPATIENT)
Dept: INTERNAL MEDICINE | Facility: CLINIC | Age: 58
End: 2018-03-26

## 2018-03-26 VITALS
RESPIRATION RATE: 18 BRPM | OXYGEN SATURATION: 100 % | WEIGHT: 180 LBS | HEIGHT: 61 IN | HEART RATE: 64 BPM | BODY MASS INDEX: 33.99 KG/M2 | DIASTOLIC BLOOD PRESSURE: 74 MMHG | SYSTOLIC BLOOD PRESSURE: 122 MMHG

## 2018-03-26 DIAGNOSIS — Z79.4 TYPE 2 DIABETES MELLITUS WITH OTHER SPECIFIED COMPLICATION, WITH LONG-TERM CURRENT USE OF INSULIN (HCC): Primary | ICD-10-CM

## 2018-03-26 DIAGNOSIS — E11.69 TYPE 2 DIABETES MELLITUS WITH OTHER SPECIFIED COMPLICATION, WITH LONG-TERM CURRENT USE OF INSULIN (HCC): Primary | ICD-10-CM

## 2018-03-26 DIAGNOSIS — G47.00 INSOMNIA, UNSPECIFIED TYPE: ICD-10-CM

## 2018-03-26 LAB — GLUCOSE BLDC GLUCOMTR-MCNC: 112 MG/DL (ref 70–130)

## 2018-03-26 PROCEDURE — 82947 ASSAY GLUCOSE BLOOD QUANT: CPT | Performed by: FAMILY MEDICINE

## 2018-03-26 PROCEDURE — 99214 OFFICE O/P EST MOD 30 MIN: CPT | Performed by: FAMILY MEDICINE

## 2018-03-26 RX ORDER — HYDROXYZINE PAMOATE 25 MG/1
25 CAPSULE ORAL NIGHTLY PRN
Qty: 30 CAPSULE | Refills: 0 | Status: SHIPPED | OUTPATIENT
Start: 2018-03-26 | End: 2018-04-23

## 2018-03-26 NOTE — PROGRESS NOTES
"Subjective   Jayna Ambrocio is a 57 y.o. female who presents to follow-up for Diabetes and complaint of Insomnia      History of Present Illness   She was last seen in the office on 02/26/18 for hospital follow-up.  Previous intervention/treatment includes: continued on current insulin regimen, continued to hold Metformin.    Patient presents to follow-up for chronic diabetes management.  Reports using her medications as directed and denies any intolerable side effects.  Has been checking her blood sugars once a day at home.  Blood sugar range has reportedly been between .  Admits that she had one episode with hypoglycemic symptoms due to not eating, symptoms improved after eating.    Also reports complaint of trouble sleeping since her recent CABG surgery.  Reports that she sleeps in a warm, dark room and has all of her lights off.  Denies feeling like her mind races or has any underlying anxiety.  Has taken Tylenol PM in the past, which has previously helped.  Has recently tried taking Tylenol PM, which has not helped.  Has also tried taking Benadryl in the past, which does not significantly help.  Denies orthopnea, dyspnea, chest pain, leg swelling.    Review of Systems   Constitutional: Negative for chills and fever.   Respiratory: Negative for cough, shortness of breath and wheezing.    Cardiovascular: Negative for chest pain and palpitations.   Gastrointestinal: Negative for abdominal pain, constipation, diarrhea, nausea and vomiting.   Neurological: Negative for dizziness, syncope and headaches.   Psychiatric/Behavioral: Positive for sleep disturbance (insomnia). Negative for self-injury and suicidal ideas. The patient is not nervous/anxious.         Negative for depressed mood.     The following portions of the patient's history were reviewed and updated as appropriate: current medications, past medical history and problem list.    Objective   /74   Pulse 64   Resp 18   Ht 154.9 cm (61\")   Wt " 81.6 kg (180 lb)   SpO2 100%   BMI 34.01 kg/m²      Physical Exam   Constitutional: She is oriented to person, place, and time. She appears well-developed and well-nourished.   No acute distress.   HENT:   Head: Normocephalic and atraumatic.   Eyes: Conjunctivae and EOM are normal.   Neck: Normal range of motion.   Cardiovascular: Normal rate, regular rhythm, normal heart sounds and intact distal pulses.    Pulmonary/Chest: Effort normal and breath sounds normal. She has no wheezes.   Abdominal: Soft. Bowel sounds are normal. There is no tenderness.   Musculoskeletal: Normal range of motion.   Moves all extremities.   Neurological: She is alert and oriented to person, place, and time.   Skin: Skin is warm and dry.   Psychiatric: She has a normal mood and affect. Her behavior is normal. Judgment and thought content normal.   Vitals reviewed.    Assessment/Plan   Jayna was seen today for diabetes and insomnia.    Diagnoses and all orders for this visit:    Type 2 diabetes mellitus with other specified complication, with long-term current use of insulin  -     POC Glucose    Fingerstick blood glucose ordered today was 112.  Discussed result with patient today.  Encouraged patient to eat regular healthy meals and snacks throughout the day to prevent hypoglycemic episodes.  Will continue current regimen today.  A refill for her medication was not requested today.  Advised patient to return to the clinic in 2 months for next routine follow-up or sooner if needed.    Insomnia, unspecified type  -     hydrOXYzine (VISTARIL) 25 MG capsule; Take 1 capsule by mouth At Night As Needed (insomnia).    Will prescribe the above medication today.  Advised patient to return to the clinic in 4 weeks for recheck or sooner if their symptoms worsen.

## 2018-04-12 ENCOUNTER — TELEPHONE (OUTPATIENT)
Dept: INTERNAL MEDICINE | Facility: CLINIC | Age: 58
End: 2018-04-12

## 2018-04-12 NOTE — TELEPHONE ENCOUNTER
Nurse Mercado from [phone] called asking if pt was taking metoprolol and lisinopril advised by  it is ok for pt to  Continue Lisinopril unless cardiology decided to stop medication before she had surgery . Spoke with pt she advised me she wasn't told to stop medication LVM for Jess.

## 2018-04-18 ENCOUNTER — APPOINTMENT (OUTPATIENT)
Dept: MAMMOGRAPHY | Facility: HOSPITAL | Age: 58
End: 2018-04-18

## 2018-04-23 ENCOUNTER — OFFICE VISIT (OUTPATIENT)
Dept: INTERNAL MEDICINE | Facility: CLINIC | Age: 58
End: 2018-04-23

## 2018-04-23 VITALS
WEIGHT: 185 LBS | DIASTOLIC BLOOD PRESSURE: 70 MMHG | HEART RATE: 68 BPM | BODY MASS INDEX: 34.93 KG/M2 | SYSTOLIC BLOOD PRESSURE: 124 MMHG | HEIGHT: 61 IN

## 2018-04-23 DIAGNOSIS — Z79.4 TYPE 2 DIABETES MELLITUS WITH OTHER SPECIFIED COMPLICATION, WITH LONG-TERM CURRENT USE OF INSULIN (HCC): ICD-10-CM

## 2018-04-23 DIAGNOSIS — I10 ESSENTIAL HYPERTENSION: Primary | ICD-10-CM

## 2018-04-23 DIAGNOSIS — E11.69 TYPE 2 DIABETES MELLITUS WITH OTHER SPECIFIED COMPLICATION, WITH LONG-TERM CURRENT USE OF INSULIN (HCC): ICD-10-CM

## 2018-04-23 DIAGNOSIS — G47.00 INSOMNIA, UNSPECIFIED TYPE: ICD-10-CM

## 2018-04-23 LAB — GLUCOSE BLDC GLUCOMTR-MCNC: 91 MG/DL (ref 70–130)

## 2018-04-23 PROCEDURE — 99214 OFFICE O/P EST MOD 30 MIN: CPT | Performed by: FAMILY MEDICINE

## 2018-04-23 PROCEDURE — 82962 GLUCOSE BLOOD TEST: CPT | Performed by: FAMILY MEDICINE

## 2018-04-23 RX ORDER — TRAZODONE HYDROCHLORIDE 50 MG/1
50 TABLET ORAL NIGHTLY
Qty: 30 TABLET | Refills: 0 | Status: SHIPPED | OUTPATIENT
Start: 2018-04-23 | End: 2018-05-18 | Stop reason: SDUPTHER

## 2018-04-23 NOTE — PROGRESS NOTES
"She Ambrocio is a 57 y.o. female who presents to follow-up for Insomnia      History of Present Illness   She was last seen in the office on 03/26/18 for diabetes and insomnia management.  Previous intervention/treatment includes: prescribed Hydroxyzine.    Patient presents to follow-up for insomnia management.  States that she took her Hydroxyzine for about 3-4 days and reports no significant improvement in her sleep.  Did not try to increase her dose.  Denies depressed or anxious mood, SI or HI.  Would like to try a different medication today.    Review of Systems   Constitutional: Negative for chills and fever.   Respiratory: Negative for cough, shortness of breath and wheezing.    Cardiovascular: Negative for chest pain and palpitations.   Gastrointestinal: Negative for abdominal pain, constipation, diarrhea, nausea and vomiting.   Neurological: Negative for dizziness, syncope and headaches.   Psychiatric/Behavioral: Positive for sleep disturbance (insomnia). Negative for self-injury and suicidal ideas. The patient is not nervous/anxious.         Negative for depressed mood.     The following portions of the patient's history were reviewed and updated as appropriate: current medications, past medical history and problem list.    Objective   /70 (BP Location: Left arm, Patient Position: Sitting)   Pulse 68   Ht 154.9 cm (60.98\")   Wt 83.9 kg (185 lb)   BMI 34.97 kg/m²      Physical Exam   Constitutional: She is oriented to person, place, and time. She appears well-developed and well-nourished.   No acute distress.   HENT:   Head: Normocephalic and atraumatic.   Eyes: Conjunctivae and EOM are normal.   Neck: Normal range of motion.   Cardiovascular: Normal rate, regular rhythm, normal heart sounds and intact distal pulses.    Pulmonary/Chest: Effort normal and breath sounds normal. She has no wheezes.   Abdominal: Soft. Bowel sounds are normal. There is no tenderness.   Musculoskeletal: " Normal range of motion.   Moves all extremities.   Neurological: She is alert and oriented to person, place, and time.   Skin: Skin is warm and dry.   Psychiatric: She has a normal mood and affect. Her behavior is normal. Judgment and thought content normal.   Vitals reviewed.      Assessment/Plan   Jayna was seen today for insomnia.    Diagnoses and all orders for this visit:    Essential hypertension    Stable.  Blood pressure today in office was 124/70.  Management per patient's cardiologist.    Type 2 diabetes mellitus with other specified complication, with long-term current use of insulin  -     POC Glucose    Patient states that she has been unable to get test strips for her glucometer and requested to have her blood sugar checked today.  Fingerstick blood glucose checked today was 91.  Discussed result with patient today.  Further management per patient's specialist.    Insomnia, unspecified type  -     traZODone (DESYREL) 50 MG tablet; Take 1 tablet by mouth Every Night. Start by taking half a tablet at night for the first week then increase to a full tablet if needed.    Will stop Hydroxyzine and prescribe the above medication today.  Advised patient to return to the clinic in 4 weeks for recheck or sooner if their symptoms worsen.

## 2018-04-23 NOTE — PATIENT INSTRUCTIONS
Please stop taking Hydroxyzine.  I have prescribed a new sleep medication for you called Trazodone.  Recommend that you take your Trazodone as follows: half a tablet at night for the first week, then may increase to a full tablet at night if needed.  Please continue taking your other current medications as directed.  Recommend that you call your diabetes specialist to discuss the issue with your test strips.  Please follow-up as indicated.  Return to the clinic sooner if your symptoms worsen or if you have any other concerns.

## 2018-05-08 DIAGNOSIS — M62.838 MUSCLE SPASM: ICD-10-CM

## 2018-05-08 RX ORDER — METHOCARBAMOL 500 MG/1
TABLET, FILM COATED ORAL
Qty: 90 TABLET | Refills: 0 | Status: SHIPPED | OUTPATIENT
Start: 2018-05-08 | End: 2018-07-01 | Stop reason: SDUPTHER

## 2018-05-18 ENCOUNTER — TELEPHONE (OUTPATIENT)
Dept: INTERNAL MEDICINE | Facility: CLINIC | Age: 58
End: 2018-05-18

## 2018-05-18 DIAGNOSIS — G47.00 INSOMNIA, UNSPECIFIED TYPE: ICD-10-CM

## 2018-05-18 RX ORDER — TRAZODONE HYDROCHLORIDE 50 MG/1
50 TABLET ORAL NIGHTLY
Qty: 30 TABLET | Refills: 0 | Status: SHIPPED | OUTPATIENT
Start: 2018-05-18 | End: 2018-06-11 | Stop reason: SDUPTHER

## 2018-06-11 DIAGNOSIS — G47.00 INSOMNIA, UNSPECIFIED TYPE: ICD-10-CM

## 2018-06-11 RX ORDER — TRAZODONE HYDROCHLORIDE 50 MG/1
TABLET ORAL
Qty: 30 TABLET | Refills: 0 | Status: SHIPPED | OUTPATIENT
Start: 2018-06-11 | End: 2018-06-15 | Stop reason: SDUPTHER

## 2018-06-15 ENCOUNTER — OFFICE VISIT (OUTPATIENT)
Dept: INTERNAL MEDICINE | Facility: CLINIC | Age: 58
End: 2018-06-15

## 2018-06-15 VITALS
HEIGHT: 61 IN | RESPIRATION RATE: 16 BRPM | BODY MASS INDEX: 36.44 KG/M2 | WEIGHT: 193 LBS | DIASTOLIC BLOOD PRESSURE: 80 MMHG | SYSTOLIC BLOOD PRESSURE: 140 MMHG

## 2018-06-15 DIAGNOSIS — E11.69 TYPE 2 DIABETES MELLITUS WITH OTHER SPECIFIED COMPLICATION, WITH LONG-TERM CURRENT USE OF INSULIN (HCC): Primary | ICD-10-CM

## 2018-06-15 DIAGNOSIS — G47.00 INSOMNIA, UNSPECIFIED TYPE: ICD-10-CM

## 2018-06-15 DIAGNOSIS — Z79.4 TYPE 2 DIABETES MELLITUS WITH OTHER SPECIFIED COMPLICATION, WITH LONG-TERM CURRENT USE OF INSULIN (HCC): Primary | ICD-10-CM

## 2018-06-15 DIAGNOSIS — K21.9 GASTROESOPHAGEAL REFLUX DISEASE, ESOPHAGITIS PRESENCE NOT SPECIFIED: ICD-10-CM

## 2018-06-15 LAB
ALBUMIN SERPL-MCNC: 4.42 G/DL (ref 3.2–4.8)
ALBUMIN/GLOB SERPL: 1.6 G/DL (ref 1.5–2.5)
ALP SERPL-CCNC: 82 U/L (ref 25–100)
ALT SERPL W P-5'-P-CCNC: 47 U/L (ref 7–40)
ANION GAP SERPL CALCULATED.3IONS-SCNC: 5 MMOL/L (ref 3–11)
ARTICHOKE IGE QN: 108 MG/DL (ref 0–130)
AST SERPL-CCNC: 25 U/L (ref 0–33)
BILIRUB SERPL-MCNC: 0.4 MG/DL (ref 0.3–1.2)
BUN BLD-MCNC: 15 MG/DL (ref 9–23)
BUN/CREAT SERPL: 13.8 (ref 7–25)
CALCIUM SPEC-SCNC: 9.7 MG/DL (ref 8.7–10.4)
CHLORIDE SERPL-SCNC: 107 MMOL/L (ref 99–109)
CHOLEST SERPL-MCNC: 215 MG/DL (ref 0–200)
CO2 SERPL-SCNC: 30 MMOL/L (ref 20–31)
CREAT BLD-MCNC: 1.09 MG/DL (ref 0.6–1.3)
GFR SERPL CREATININE-BSD FRML MDRD: 62 ML/MIN/1.73
GLOBULIN UR ELPH-MCNC: 2.8 GM/DL
GLUCOSE BLD-MCNC: 73 MG/DL (ref 70–100)
HBA1C MFR BLD: 6.4 % (ref 4.8–5.6)
HDLC SERPL-MCNC: 92 MG/DL (ref 40–60)
POTASSIUM BLD-SCNC: 4.6 MMOL/L (ref 3.5–5.5)
PROT SERPL-MCNC: 7.2 G/DL (ref 5.7–8.2)
SODIUM BLD-SCNC: 142 MMOL/L (ref 132–146)
TRIGL SERPL-MCNC: 181 MG/DL (ref 0–150)

## 2018-06-15 PROCEDURE — 80061 LIPID PANEL: CPT | Performed by: FAMILY MEDICINE

## 2018-06-15 PROCEDURE — 80053 COMPREHEN METABOLIC PANEL: CPT | Performed by: FAMILY MEDICINE

## 2018-06-15 PROCEDURE — 83036 HEMOGLOBIN GLYCOSYLATED A1C: CPT | Performed by: FAMILY MEDICINE

## 2018-06-15 PROCEDURE — 99214 OFFICE O/P EST MOD 30 MIN: CPT | Performed by: FAMILY MEDICINE

## 2018-06-15 RX ORDER — PANTOPRAZOLE SODIUM 40 MG/1
40 TABLET, DELAYED RELEASE ORAL DAILY
Qty: 90 TABLET | Refills: 1 | Status: SHIPPED | OUTPATIENT
Start: 2018-06-15 | End: 2018-09-21 | Stop reason: SDUPTHER

## 2018-06-15 RX ORDER — TRAZODONE HYDROCHLORIDE 50 MG/1
50 TABLET ORAL NIGHTLY
Qty: 90 TABLET | Refills: 0 | Status: SHIPPED | OUTPATIENT
Start: 2018-06-15 | End: 2018-09-21 | Stop reason: SDUPTHER

## 2018-06-15 RX ORDER — INSULIN LISPRO 100 [IU]/ML
20 INJECTION, SUSPENSION SUBCUTANEOUS 2 TIMES DAILY
Qty: 5 PEN | Refills: 3 | Status: SHIPPED | OUTPATIENT
Start: 2018-06-15 | End: 2018-09-21 | Stop reason: SDUPTHER

## 2018-06-15 NOTE — PATIENT INSTRUCTIONS
Please go to the lab before you leave to have your labs drawn.  You will be called or receive a letter with your results.  I have refilled your insulin, Protonix and Trazodone.  Your medications have been electronically prescribed and will be at your pharmacy.  Please pick them up and take as directed.  Please follow-up as indicated.  Return to the clinic sooner if your symptoms worsen or if you have any other concerns.

## 2018-06-15 NOTE — PROGRESS NOTES
"She Ambrocio is a 57 y.o. female who presents to follow-up for Insomnia      History of Present Illness   She was last seen in the office on 04/23/18 for insomnia management.  Previous intervention/treatment includes: stopped Hydroxyzine, prescribed Trazodone.    Patient presents to follow-up for chronic insomnia management.  Has been taking her Trazodone as directed and denies intolerable side effects.  Reports improved sleep and is requesting a refill today.  Does not report any mood changes.    Also presents for chronic diabetes and GERD management.  Has been taking her medications as directed and denies any intolerable side effects.  Has been checking her blood sugars at home and states that her average is less than 100.  Had one hypoglycemic episode with a blood sugar around 35, but states that she had not eaten breakfast or lunch that day.  Symptoms improved with eating and patient did not require hospitalization.    Review of Systems   Constitutional: Negative for chills and fever.   Respiratory: Negative for cough, shortness of breath and wheezing.    Cardiovascular: Negative for chest pain and palpitations.   Gastrointestinal: Negative for abdominal pain, constipation, diarrhea, nausea and vomiting.   Neurological: Negative for dizziness, syncope and headaches.   Psychiatric/Behavioral: Positive for sleep disturbance (improved). Negative for self-injury and suicidal ideas. The patient is not nervous/anxious.         Negative for depressed mood.     The following portions of the patient's history were reviewed and updated as appropriate: current medications, past medical history and problem list.    Objective   /80   Resp 16   Ht 154.9 cm (60.98\")   Wt 87.5 kg (193 lb)   BMI 36.49 kg/m²      Physical Exam   Constitutional: She is oriented to person, place, and time. She appears well-developed and well-nourished.   No acute distress.   HENT:   Head: Normocephalic and atraumatic. "   Eyes: Conjunctivae and EOM are normal.   Neck: Normal range of motion.   Cardiovascular: Normal rate, regular rhythm, normal heart sounds and intact distal pulses.    Pulmonary/Chest: Effort normal and breath sounds normal. She has no wheezes.   Abdominal: Soft. Bowel sounds are normal. There is no tenderness.   Musculoskeletal: Normal range of motion.   Moves all extremities.   Neurological: She is alert and oriented to person, place, and time.   Skin: Skin is warm and dry.   Psychiatric: She has a normal mood and affect. Her behavior is normal. Judgment and thought content normal.   Vitals reviewed.      Assessment/Plan   Jayna was seen today for insomnia.    Diagnoses and all orders for this visit:    Type 2 diabetes mellitus with other specified complication, with long-term current use of insulin  -     Comprehensive Metabolic Panel  -     Lipid Panel  -     Hemoglobin A1c  -     Insulin Lispro Prot & Lispro (HUMALOG MIX 75/25 KWIKPEN) (75-25) 100 UNIT/ML suspension pen-injector; Inject 20 Units under the skin 2 (Two) Times a Day.    The above labs were ordered today.  Will continue current regimen for now and plan to adjust if lab results indicate.  Advised patient to return to the clinic in 3 months for next routine follow-up or sooner if needed.    Gastroesophageal reflux disease, esophagitis presence not specified  -     pantoprazole (PROTONIX) 40 MG EC tablet; Take 1 tablet by mouth Daily.    Stable symptoms per patient report.  Will continue current regimen today.  A refill for her medication was requested today.  Advised patient to return to the clinic in 3 months for next routine follow-up or sooner if needed.    Insomnia, unspecified type  -     traZODone (DESYREL) 50 MG tablet; Take 1 tablet by mouth Every Night.    Improved symptoms per patient report.  Will continue current regimen today.  A refill for her medication was requested today.  Advised patient to return to the clinic in 3 months for  next routine follow-up or sooner if needed.

## 2018-06-18 RX ORDER — ATORVASTATIN CALCIUM 80 MG/1
80 TABLET, FILM COATED ORAL DAILY
Qty: 30 TABLET | Refills: 2 | Status: SHIPPED | OUTPATIENT
Start: 2018-06-18 | End: 2018-09-21 | Stop reason: SDUPTHER

## 2018-07-01 DIAGNOSIS — M62.838 MUSCLE SPASM: ICD-10-CM

## 2018-07-02 RX ORDER — METHOCARBAMOL 500 MG/1
TABLET, FILM COATED ORAL
Qty: 90 TABLET | Refills: 0 | Status: SHIPPED | OUTPATIENT
Start: 2018-07-02 | End: 2018-08-22 | Stop reason: SDUPTHER

## 2018-08-21 DIAGNOSIS — M62.838 MUSCLE SPASM: ICD-10-CM

## 2018-08-22 DIAGNOSIS — M62.838 MUSCLE SPASM: ICD-10-CM

## 2018-08-22 RX ORDER — METHOCARBAMOL 500 MG/1
500 TABLET, FILM COATED ORAL 3 TIMES DAILY PRN
Qty: 90 TABLET | Refills: 0 | Status: SHIPPED | OUTPATIENT
Start: 2018-08-22 | End: 2018-09-21 | Stop reason: SDUPTHER

## 2018-08-23 RX ORDER — METHOCARBAMOL 500 MG/1
TABLET, FILM COATED ORAL
Qty: 90 TABLET | Refills: 0 | OUTPATIENT
Start: 2018-08-23

## 2018-08-24 ENCOUNTER — APPOINTMENT (OUTPATIENT)
Dept: MAMMOGRAPHY | Facility: HOSPITAL | Age: 58
End: 2018-08-24

## 2018-09-07 ENCOUNTER — TELEPHONE (OUTPATIENT)
Dept: INTERNAL MEDICINE | Facility: CLINIC | Age: 58
End: 2018-09-07

## 2018-09-21 ENCOUNTER — TELEPHONE (OUTPATIENT)
Dept: INTERNAL MEDICINE | Facility: CLINIC | Age: 58
End: 2018-09-21

## 2018-09-21 ENCOUNTER — OFFICE VISIT (OUTPATIENT)
Dept: INTERNAL MEDICINE | Facility: CLINIC | Age: 58
End: 2018-09-21

## 2018-09-21 VITALS
HEART RATE: 72 BPM | RESPIRATION RATE: 20 BRPM | SYSTOLIC BLOOD PRESSURE: 138 MMHG | WEIGHT: 197 LBS | HEIGHT: 61 IN | DIASTOLIC BLOOD PRESSURE: 78 MMHG | BODY MASS INDEX: 37.19 KG/M2

## 2018-09-21 DIAGNOSIS — M62.838 MUSCLE SPASM: ICD-10-CM

## 2018-09-21 DIAGNOSIS — E11.69 TYPE 2 DIABETES MELLITUS WITH OTHER SPECIFIED COMPLICATION, WITH LONG-TERM CURRENT USE OF INSULIN (HCC): Primary | ICD-10-CM

## 2018-09-21 DIAGNOSIS — Z12.11 ENCOUNTER FOR SCREENING COLONOSCOPY: ICD-10-CM

## 2018-09-21 DIAGNOSIS — Z11.59 NEED FOR HEPATITIS C SCREENING TEST: ICD-10-CM

## 2018-09-21 DIAGNOSIS — K21.9 GASTROESOPHAGEAL REFLUX DISEASE, ESOPHAGITIS PRESENCE NOT SPECIFIED: ICD-10-CM

## 2018-09-21 DIAGNOSIS — G47.00 INSOMNIA, UNSPECIFIED TYPE: ICD-10-CM

## 2018-09-21 DIAGNOSIS — I25.810 CORONARY ARTERY DISEASE INVOLVING CORONARY BYPASS GRAFT WITHOUT ANGINA PECTORIS, UNSPECIFIED WHETHER NATIVE OR TRANSPLANTED HEART: ICD-10-CM

## 2018-09-21 DIAGNOSIS — Z79.4 TYPE 2 DIABETES MELLITUS WITH OTHER SPECIFIED COMPLICATION, WITH LONG-TERM CURRENT USE OF INSULIN (HCC): Primary | ICD-10-CM

## 2018-09-21 DIAGNOSIS — I10 ESSENTIAL HYPERTENSION: ICD-10-CM

## 2018-09-21 LAB
ALBUMIN SERPL-MCNC: 4.38 G/DL (ref 3.2–4.8)
ALBUMIN/GLOB SERPL: 2.2 G/DL (ref 1.5–2.5)
ALP SERPL-CCNC: 83 U/L (ref 25–100)
ALT SERPL W P-5'-P-CCNC: 33 U/L (ref 7–40)
ANION GAP SERPL CALCULATED.3IONS-SCNC: 3 MMOL/L (ref 3–11)
ARTICHOKE IGE QN: 65 MG/DL (ref 0–130)
AST SERPL-CCNC: 30 U/L (ref 0–33)
BASOPHILS # BLD AUTO: 0.05 10*3/MM3 (ref 0–0.2)
BASOPHILS NFR BLD AUTO: 0.7 % (ref 0–1)
BILIRUB SERPL-MCNC: 0.4 MG/DL (ref 0.3–1.2)
BUN BLD-MCNC: 13 MG/DL (ref 9–23)
BUN/CREAT SERPL: 13 (ref 7–25)
CALCIUM SPEC-SCNC: 9.3 MG/DL (ref 8.7–10.4)
CHLORIDE SERPL-SCNC: 107 MMOL/L (ref 99–109)
CHOLEST SERPL-MCNC: 139 MG/DL (ref 0–200)
CO2 SERPL-SCNC: 27 MMOL/L (ref 20–31)
CREAT BLD-MCNC: 1 MG/DL (ref 0.6–1.3)
DEPRECATED RDW RBC AUTO: 44.9 FL (ref 37–54)
EOSINOPHIL # BLD AUTO: 0.49 10*3/MM3 (ref 0–0.3)
EOSINOPHIL NFR BLD AUTO: 6.7 % (ref 0–3)
ERYTHROCYTE [DISTWIDTH] IN BLOOD BY AUTOMATED COUNT: 13.6 % (ref 11.3–14.5)
GFR SERPL CREATININE-BSD FRML MDRD: 69 ML/MIN/1.73
GLOBULIN UR ELPH-MCNC: 2 GM/DL
GLUCOSE BLD-MCNC: 70 MG/DL (ref 70–100)
HBA1C MFR BLD: 7 % (ref 4.8–5.6)
HCT VFR BLD AUTO: 41.4 % (ref 34.5–44)
HCV AB SER DONR QL: REACTIVE
HDLC SERPL-MCNC: 61 MG/DL (ref 40–60)
HGB BLD-MCNC: 13.3 G/DL (ref 11.5–15.5)
IMM GRANULOCYTES # BLD: 0.01 10*3/MM3 (ref 0–0.03)
IMM GRANULOCYTES NFR BLD: 0.1 % (ref 0–0.6)
LYMPHOCYTES # BLD AUTO: 2.16 10*3/MM3 (ref 0.6–4.8)
LYMPHOCYTES NFR BLD AUTO: 29.5 % (ref 24–44)
MCH RBC QN AUTO: 29.4 PG (ref 27–31)
MCHC RBC AUTO-ENTMCNC: 32.1 G/DL (ref 32–36)
MCV RBC AUTO: 91.4 FL (ref 80–99)
MONOCYTES # BLD AUTO: 0.63 10*3/MM3 (ref 0–1)
MONOCYTES NFR BLD AUTO: 8.6 % (ref 0–12)
NEUTROPHILS # BLD AUTO: 3.99 10*3/MM3 (ref 1.5–8.3)
NEUTROPHILS NFR BLD AUTO: 54.5 % (ref 41–71)
PLATELET # BLD AUTO: 366 10*3/MM3 (ref 150–450)
PMV BLD AUTO: 10.5 FL (ref 6–12)
POTASSIUM BLD-SCNC: 4.3 MMOL/L (ref 3.5–5.5)
PROT SERPL-MCNC: 6.4 G/DL (ref 5.7–8.2)
RBC # BLD AUTO: 4.53 10*6/MM3 (ref 3.89–5.14)
SODIUM BLD-SCNC: 137 MMOL/L (ref 132–146)
TRIGL SERPL-MCNC: 67 MG/DL (ref 0–150)
TSH SERPL DL<=0.05 MIU/L-ACNC: 0.74 MIU/ML (ref 0.35–5.35)
VIT B12 BLD-MCNC: 408 PG/ML (ref 211–911)
WBC NRBC COR # BLD: 7.32 10*3/MM3 (ref 3.5–10.8)

## 2018-09-21 PROCEDURE — 99214 OFFICE O/P EST MOD 30 MIN: CPT | Performed by: NURSE PRACTITIONER

## 2018-09-21 PROCEDURE — 82607 VITAMIN B-12: CPT | Performed by: NURSE PRACTITIONER

## 2018-09-21 PROCEDURE — 84443 ASSAY THYROID STIM HORMONE: CPT | Performed by: NURSE PRACTITIONER

## 2018-09-21 PROCEDURE — 80061 LIPID PANEL: CPT | Performed by: NURSE PRACTITIONER

## 2018-09-21 PROCEDURE — 87522 HEPATITIS C REVRS TRNSCRPJ: CPT | Performed by: NURSE PRACTITIONER

## 2018-09-21 PROCEDURE — 85025 COMPLETE CBC W/AUTO DIFF WBC: CPT | Performed by: NURSE PRACTITIONER

## 2018-09-21 PROCEDURE — 83036 HEMOGLOBIN GLYCOSYLATED A1C: CPT | Performed by: NURSE PRACTITIONER

## 2018-09-21 PROCEDURE — 80053 COMPREHEN METABOLIC PANEL: CPT | Performed by: NURSE PRACTITIONER

## 2018-09-21 PROCEDURE — 86803 HEPATITIS C AB TEST: CPT | Performed by: NURSE PRACTITIONER

## 2018-09-21 RX ORDER — INSULIN ASPART 100 [IU]/ML
INJECTION, SUSPENSION SUBCUTANEOUS
Qty: 3 ML | Refills: 5 | Status: SHIPPED | OUTPATIENT
Start: 2018-09-21 | End: 2019-02-13

## 2018-09-21 RX ORDER — AMIODARONE HYDROCHLORIDE 200 MG/1
200 TABLET ORAL DAILY
Qty: 90 TABLET | Refills: 2 | Status: SHIPPED | OUTPATIENT
Start: 2018-09-21 | End: 2019-08-09

## 2018-09-21 RX ORDER — BLOOD-GLUCOSE METER
KIT MISCELLANEOUS
Qty: 90 EACH | Refills: 5 | Status: SHIPPED | OUTPATIENT
Start: 2018-09-21 | End: 2020-07-17

## 2018-09-21 RX ORDER — TRAZODONE HYDROCHLORIDE 50 MG/1
50 TABLET ORAL NIGHTLY
Qty: 90 TABLET | Refills: 2 | Status: SHIPPED | OUTPATIENT
Start: 2018-09-21 | End: 2019-02-12

## 2018-09-21 RX ORDER — METHOCARBAMOL 500 MG/1
500 TABLET, FILM COATED ORAL 3 TIMES DAILY PRN
Qty: 90 TABLET | Refills: 0 | Status: SHIPPED | OUTPATIENT
Start: 2018-09-21 | End: 2018-11-30 | Stop reason: SDUPTHER

## 2018-09-21 RX ORDER — ISOSORBIDE MONONITRATE 60 MG/1
60 TABLET, EXTENDED RELEASE ORAL EVERY MORNING
Qty: 90 TABLET | Refills: 2 | Status: SHIPPED | OUTPATIENT
Start: 2018-09-21 | End: 2019-08-09 | Stop reason: SDUPTHER

## 2018-09-21 RX ORDER — FUROSEMIDE 40 MG/1
40 TABLET ORAL DAILY
Qty: 90 TABLET | Refills: 2 | Status: SHIPPED | OUTPATIENT
Start: 2018-09-21 | End: 2019-08-09

## 2018-09-21 RX ORDER — PEN NEEDLE, DIABETIC 31 GX5/16"
NEEDLE, DISPOSABLE MISCELLANEOUS
Refills: 2 | COMMUNITY
Start: 2018-07-27 | End: 2018-09-21 | Stop reason: SDUPTHER

## 2018-09-21 RX ORDER — ATORVASTATIN CALCIUM 80 MG/1
80 TABLET, FILM COATED ORAL DAILY
Qty: 30 TABLET | Refills: 2 | Status: SHIPPED | OUTPATIENT
Start: 2018-09-21 | End: 2019-08-09

## 2018-09-21 RX ORDER — INSULIN ASPART 100 [IU]/ML
INJECTION, SUSPENSION SUBCUTANEOUS
Refills: 5 | COMMUNITY
Start: 2018-07-31 | End: 2018-09-21 | Stop reason: SDUPTHER

## 2018-09-21 RX ORDER — CLOPIDOGREL BISULFATE 75 MG/1
75 TABLET ORAL DAILY
Qty: 90 TABLET | Refills: 2 | Status: SHIPPED | OUTPATIENT
Start: 2018-09-21 | End: 2019-08-09

## 2018-09-21 RX ORDER — BLOOD-GLUCOSE METER
KIT MISCELLANEOUS
Refills: 5 | COMMUNITY
Start: 2018-07-13 | End: 2018-09-21 | Stop reason: SDUPTHER

## 2018-09-21 RX ORDER — INSULIN LISPRO 100 [IU]/ML
20 INJECTION, SUSPENSION SUBCUTANEOUS 2 TIMES DAILY
Qty: 5 PEN | Refills: 3 | Status: SHIPPED | OUTPATIENT
Start: 2018-09-21 | End: 2019-08-09 | Stop reason: SDUPTHER

## 2018-09-21 RX ORDER — PANTOPRAZOLE SODIUM 40 MG/1
40 TABLET, DELAYED RELEASE ORAL DAILY
Qty: 90 TABLET | Refills: 1 | Status: SHIPPED | OUTPATIENT
Start: 2018-09-21 | End: 2019-01-07 | Stop reason: SDUPTHER

## 2018-09-21 RX ORDER — LISINOPRIL 20 MG/1
20 TABLET ORAL DAILY
Refills: 5 | COMMUNITY
Start: 2018-08-24 | End: 2018-09-21

## 2018-09-21 RX ORDER — PEN NEEDLE, DIABETIC 31 GX5/16"
1 NEEDLE, DISPOSABLE MISCELLANEOUS 2 TIMES DAILY
Qty: 90 EACH | Refills: 2 | Status: SHIPPED | OUTPATIENT
Start: 2018-09-21 | End: 2019-08-09 | Stop reason: SDUPTHER

## 2018-09-21 NOTE — PATIENT INSTRUCTIONS

## 2018-09-21 NOTE — PROGRESS NOTES
Subjective   Jayna Ambrocio is a 57 y.o. female here today for DM FU.    Chief Complaint   Patient presents with   • Diabetes   • Med Refill     All medication labs?   Jayna is here for diabetic follow-up    She is compliant with her medications and denies any recent hypoglycemia.  She did have a hypoglycemic episode before last visit and has been trying to eat more to avoid this again.  She reports that this has made her gain weight.  She does check her feet daily.  She needs an eye exam (blurred vision).  She is on a statin.  She is no longer on an ACE inhibitor.  She reports after her last hospitalization they took her off the ACE inhibitor.  Her blood pressure is controlled today but we may consider putting back on in the future.  She reports that she checks her blood sugar every day.  She is never over 150, she is mostly in the low 100s.  Last A1C was 6.4  She would like to wait on the flu shot for now    She also has a history of insomnia which is controlled with trazodone at this time.    She has a history of GERD which is controlled with Protonix at this time    CAD: Stable at this time.  She is on amiodarone, aspirin, atorvastatin, Plavix, Imdur,  Lasix and metoprolol.  She denies any adverse effects, she is unsure what her next cardiology appointment is.  She needs refills on her medications          Review of Systems   Constitutional: Positive for appetite change and unexpected weight gain. Negative for activity change, chills, fatigue and fever.        Has increased eating due to fear of hypoglycemia again   HENT: Positive for ear pain.    Eyes: Positive for blurred vision. Negative for double vision.        Left eye- recent cataract removal   Respiratory: Positive for cough. Negative for shortness of breath and wheezing.    Cardiovascular: Negative for chest pain, palpitations and leg swelling.   Gastrointestinal: Positive for diarrhea. Negative for nausea and vomiting.   Endocrine: Negative for  "polydipsia, polyphagia and polyuria.   Skin: Negative.    Neurological: Negative for dizziness, headache and confusion.       Past Medical History:   Diagnosis Date   • Arthritis     bilateral hands   • Coronary artery disease    • Diabetes mellitus (CMS/HCC)    • Heart failure (CMS/HCC)    • Migraines      Family History   Problem Relation Age of Onset   • Cancer Mother         Unsure what kind   • Diabetes Mother    • No Known Problems Father    • Diabetes Sister    • Cancer Sister         Unsure what kind   • Coronary artery disease Brother    • No Known Problems Maternal Aunt    • No Known Problems Maternal Uncle    • No Known Problems Paternal Aunt    • No Known Problems Paternal Uncle    • No Known Problems Maternal Grandmother    • No Known Problems Maternal Grandfather    • No Known Problems Paternal Grandmother    • No Known Problems Paternal Grandfather    • Diabetes Sister    • No Known Problems Daughter    • Diabetes Son    • No Known Problems Son      Past Surgical History:   Procedure Laterality Date   • CORONARY ARTERY BYPASS GRAFT  02/01/2018    4 vessel   • FOOT SURGERY Left 03/15/2012     Social History     Social History   • Marital status: Single     Spouse name: N/A   • Number of children: 3   • Years of education: N/A     Occupational History   • Not on file.     Social History Main Topics   • Smoking status: Former Smoker     Types: Cigarettes     Start date: 1980     Quit date: 2014   • Smokeless tobacco: Never Used      Comment: \"1 pack could last 2 weeks\"   • Alcohol use No   • Drug use: No   • Sexual activity: Not Currently     Partners: Male     Other Topics Concern   • Not on file     Social History Narrative   • No narrative on file         Current Outpatient Prescriptions:   •  amiodarone (PACERONE) 200 MG tablet, Take 1 tablet by mouth Daily., Disp: 90 tablet, Rfl: 2  •  aspirin 81 MG tablet, Take 1 tablet by mouth Daily., Disp: 90 tablet, Rfl: 2  •  atorvastatin (LIPITOR) 80 MG " "tablet, Take 1 tablet by mouth Daily., Disp: 30 tablet, Rfl: 2  •  B-D ULTRAFINE III SHORT PEN 31G X 8 MM misc, Inject 1 unit marking on U-100 syringe as directed 2 (Two) Times a Day., Disp: 90 each, Rfl: 2  •  clopidogrel (PLAVIX) 75 MG tablet, Take 1 tablet by mouth Daily. 200001, Disp: 90 tablet, Rfl: 2  •  FREESTYLE LITE test strip, Use as instructed, Disp: 90 each, Rfl: 5  •  furosemide (LASIX) 40 MG tablet, Take 1 tablet by mouth Daily., Disp: 90 tablet, Rfl: 2  •  Insulin Lispro Prot & Lispro (HUMALOG MIX 75/25 KWIKPEN) (75-25) 100 UNIT/ML suspension pen-injector, Inject 20 Units under the skin into the appropriate area as directed 2 (Two) Times a Day., Disp: 5 pen, Rfl: 3  •  isosorbide mononitrate (IMDUR) 60 MG 24 hr tablet, Take 1 tablet by mouth Every Morning., Disp: 90 tablet, Rfl: 2  •  methocarbamol (ROBAXIN) 500 MG tablet, Take 1 tablet by mouth 3 (Three) Times a Day As Needed for Muscle Spasms., Disp: 90 tablet, Rfl: 0  •  metoprolol tartrate (LOPRESSOR) 25 MG tablet, Take 1 tablet by mouth Daily. Take 2 bid, Disp: 60 tablet, Rfl: 0  •  NOVOLOG MIX 70/30 FLEXPEN (70-30) 100 UNIT/ML suspension pen-injector injection, 20 units subcutaneously before breakfast and dinner. Titrate as intructed, max of 50 units a day, Disp: 3 mL, Rfl: 5  •  pantoprazole (PROTONIX) 40 MG EC tablet, Take 1 tablet by mouth Daily., Disp: 90 tablet, Rfl: 1  •  traMADol (ULTRAM) 50 MG tablet, Take 50 mg by mouth Every 4 (Four) Hours As Needed for Moderate Pain ., Disp: , Rfl:   •  traZODone (DESYREL) 50 MG tablet, Take 1 tablet by mouth Every Night., Disp: 90 tablet, Rfl: 2    Objective   Vitals:    09/21/18 1009   BP: 138/78   Pulse: 72   Resp: 20   Weight: 89.4 kg (197 lb)   Height: 154.9 cm (60.98\")     Body mass index is 37.25 kg/m².  Physical Exam   Constitutional: She is oriented to person, place, and time. She appears well-developed and well-nourished. She is cooperative. No distress.   HENT:   Head: Normocephalic. "   Right Ear: Tympanic membrane and external ear normal.   Left Ear: Tympanic membrane and external ear normal.   Mouth/Throat: Oropharynx is clear and moist.   Eyes: Pupils are equal, round, and reactive to light. Conjunctivae are normal. No scleral icterus.   Neck: Neck supple. No tracheal deviation present. No thyroid mass and no thyromegaly present.   Cardiovascular: Normal rate, regular rhythm, normal heart sounds and intact distal pulses.  Exam reveals no gallop and no friction rub.    No murmur heard.  Pulmonary/Chest: Effort normal and breath sounds normal. No respiratory distress. She has no wheezes. She has no rales.   Abdominal: Soft. Normal appearance and bowel sounds are normal. She exhibits no distension and no mass. There is no tenderness. There is no rebound and no guarding. No hernia.   Musculoskeletal: Normal range of motion. She exhibits no edema, tenderness or deformity.   ROM normal with all major joints   Lymphadenopathy:     She has no cervical adenopathy.   Neurological: She is alert and oriented to person, place, and time.   Skin: Skin is warm and dry. Capillary refill takes 2 to 3 seconds. No rash noted. She is not diaphoretic.   Psychiatric: She has a normal mood and affect. Her speech is normal and behavior is normal. Judgment and thought content normal.   Nursing note and vitals reviewed.      Assessment/Plan   Problem List Items Addressed This Visit     Type 2 diabetes mellitus (CMS/[facility]) - Primary    Relevant Medications    NOVOLOG MIX 70/30 FLEXPEN (70-30) 100 UNIT/ML suspension pen-injector injection    Insulin Lispro Prot & Lispro (HUMALOG MIX 75/25 KWIKPEN) (75-25) 100 UNIT/ML suspension pen-injector    FREESTYLE LITE test strip    B-D ULTRAFINE III SHORT PEN 31G X 8 MM misc    Other Relevant Orders    Ambulatory Referral for Diabetic Eye Exam-Ophthalmology    TSH    Lipid Panel    Comprehensive Metabolic Panel    Hemoglobin A1c    CBC & Differential    Vitamin B12    CBC Auto  Differential    Coronary artery disease    Relevant Medications    metoprolol tartrate (LOPRESSOR) 25 MG tablet    isosorbide mononitrate (IMDUR) 60 MG 24 hr tablet    furosemide (LASIX) 40 MG tablet    clopidogrel (PLAVIX) 75 MG tablet    atorvastatin (LIPITOR) 80 MG tablet    aspirin 81 MG tablet    amiodarone (PACERONE) 200 MG tablet    Essential hypertension    Relevant Medications    metoprolol tartrate (LOPRESSOR) 25 MG tablet    furosemide (LASIX) 40 MG tablet    Insomnia    Relevant Medications    traZODone (DESYREL) 50 MG tablet    Gastroesophageal reflux disease    Relevant Medications    pantoprazole (PROTONIX) 40 MG EC tablet      Other Visit Diagnoses     Muscle spasm        Relevant Medications    methocarbamol (ROBAXIN) 500 MG tablet    Encounter for screening colonoscopy        Relevant Orders    Ambulatory Referral For Screening Colonoscopy    Need for hepatitis C screening test        Relevant Orders    Hepatitis C antibody        Jayna was seen today for diabetes and med refill.    Diagnoses and all orders for this visit:    Type 2 diabetes mellitus with other specified complication, with long-term current use of insulin (CMS/[facility])  -     Ambulatory Referral for Diabetic Eye Exam-Ophthalmology  -     NOVOLOG MIX 70/30 FLEXPEN (70-30) 100 UNIT/ML suspension pen-injector injection; 20 units subcutaneously before breakfast and dinner. Titrate as intructed, max of 50 units a day  -     Insulin Lispro Prot & Lispro (HUMALOG MIX 75/25 KWIKPEN) (75-25) 100 UNIT/ML suspension pen-injector; Inject 20 Units under the skin into the appropriate area as directed 2 (Two) Times a Day.  -     FREESTYLE LITE test strip; Use as instructed  -     B-D ULTRAFINE III SHORT PEN 31G X 8 MM misc; Inject 1 unit marking on U-100 syringe as directed 2 (Two) Times a Day.  -     TSH  -     Lipid Panel  -     Comprehensive Metabolic Panel  -     Hemoglobin A1c  -     CBC & Differential  -     Vitamin B12  -     CBC Auto  Differential  -     Continue current treatment plan.  Will address after A1C results.  -     Information on 1800-calorie diet was provided as well as portion control    Essential hypertension  -     metoprolol tartrate (LOPRESSOR) 25 MG tablet; Take 1 tablet by mouth Daily. Take 2 bid    Coronary artery disease involving coronary bypass graft without angina pectoris, unspecified whether native or transplanted heart  -     metoprolol tartrate (LOPRESSOR) 25 MG tablet; Take 1 tablet by mouth Daily. Take 2 bid  -     isosorbide mononitrate (IMDUR) 60 MG 24 hr tablet; Take 1 tablet by mouth Every Morning.  -     furosemide (LASIX) 40 MG tablet; Take 1 tablet by mouth Daily.  -     clopidogrel (PLAVIX) 75 MG tablet; Take 1 tablet by mouth Daily. 200001  -     atorvastatin (LIPITOR) 80 MG tablet; Take 1 tablet by mouth Daily.  -     aspirin 81 MG tablet; Take 1 tablet by mouth Daily.  -     amiodarone (PACERONE) 200 MG tablet; Take 1 tablet by mouth Daily.    Insomnia, unspecified type  -     traZODone (DESYREL) 50 MG tablet; Take 1 tablet by mouth Every Night.    Gastroesophageal reflux disease, esophagitis presence not specified  -     pantoprazole (PROTONIX) 40 MG EC tablet; Take 1 tablet by mouth Daily.    Muscle spasm  -     methocarbamol (ROBAXIN) 500 MG tablet; Take 1 tablet by mouth 3 (Three) Times a Day As Needed for Muscle Spasms.    Encounter for screening colonoscopy  -     Ambulatory Referral For Screening Colonoscopy    Need for hepatitis C screening test  -     Hepatitis C antibody; Future  -     Hepatitis C antibody             The patient was counseled regarding diagnostic results, impressions, prognosis, instructions for management, risk factor reductions, education, and importance of treatment compliance.  The patient verbalized understanding of and agreement with the plan of care.    Advised patient to call with any further questions and any new or worsening symptoms.     Return in about 3 months  (around 12/21/2018) for Medicare Wellness, Labs Today.      ROCCO Alejandra     I have reviewed the notes, assessments, and/or procedures performed by ROCCO Pozo and I concur with her documentation of Jayna Ambrocio.

## 2018-09-21 NOTE — TELEPHONE ENCOUNTER
Called to discuss Hep C positive blood results- I have asked her to come back in for repeat lab work and a referral to ID for treatment.  She was busy at the time but verbalized she would call back and make a follow up.  We discussed Possible transmission, ways to spread the disease, and symptoms of the disease.  She verbalizes understanding and denied any further questions.  We discussed the rest of her labs being normal I will call her with her A1c result when it does come in.

## 2018-09-23 LAB
HCV RNA SERPL NAA+PROBE-ACNC: NORMAL IU/ML
TEST INFORMATION: NORMAL

## 2018-09-24 ENCOUNTER — TELEPHONE (OUTPATIENT)
Dept: INTERNAL MEDICINE | Facility: CLINIC | Age: 58
End: 2018-09-24

## 2018-09-24 NOTE — TELEPHONE ENCOUNTER
Called pt states she would like you to call her about recent labs (personal). She also states she's having transportation paper faxed over for you to complete. She's only aloud to have so many cab rides per year, states she's used all of her passes for the year. Needing a excuse for more passes.[email or web address]

## 2018-09-25 ENCOUNTER — TELEPHONE (OUTPATIENT)
Dept: INTERNAL MEDICINE | Facility: CLINIC | Age: 58
End: 2018-09-25

## 2018-09-25 NOTE — TELEPHONE ENCOUNTER
Called to discuss lab results and negative HCV.  A1C 7- discussed dietary changes- continue current medication regimen- no further questions at this time.

## 2018-10-01 ENCOUNTER — TELEPHONE (OUTPATIENT)
Dept: INTERNAL MEDICINE | Facility: CLINIC | Age: 58
End: 2018-10-01

## 2018-10-01 NOTE — TELEPHONE ENCOUNTER
Pt states the form that was completed needed to say Cab transportation not bus transportation. Advised that will correct the form and fax it back.

## 2018-10-03 ENCOUNTER — TELEPHONE (OUTPATIENT)
Dept: INTERNAL MEDICINE | Facility: CLINIC | Age: 58
End: 2018-10-03

## 2018-10-12 DIAGNOSIS — Z12.11 SCREENING FOR COLON CANCER: Primary | ICD-10-CM

## 2018-10-12 RX ORDER — SODIUM, POTASSIUM,MAG SULFATES 17.5-3.13G
SOLUTION, RECONSTITUTED, ORAL ORAL
Qty: 2 BOTTLE | Refills: 0 | Status: SHIPPED | OUTPATIENT
Start: 2018-10-12 | End: 2019-08-09

## 2018-10-22 ENCOUNTER — OUTSIDE FACILITY SERVICE (OUTPATIENT)
Dept: GASTROENTEROLOGY | Facility: CLINIC | Age: 58
End: 2018-10-22

## 2018-10-22 ENCOUNTER — LAB REQUISITION (OUTPATIENT)
Dept: LAB | Facility: HOSPITAL | Age: 58
End: 2018-10-22

## 2018-10-22 DIAGNOSIS — Z12.11 ENCOUNTER FOR SCREENING FOR MALIGNANT NEOPLASM OF COLON: ICD-10-CM

## 2018-10-22 PROCEDURE — 45385 COLONOSCOPY W/LESION REMOVAL: CPT | Performed by: INTERNAL MEDICINE

## 2018-10-22 PROCEDURE — 88305 TISSUE EXAM BY PATHOLOGIST: CPT | Performed by: INTERNAL MEDICINE

## 2018-10-23 LAB
CYTO UR: NORMAL
LAB AP CASE REPORT: NORMAL
LAB AP CLINICAL INFORMATION: NORMAL
PATH REPORT.FINAL DX SPEC: NORMAL
PATH REPORT.GROSS SPEC: NORMAL

## 2018-11-05 PROBLEM — E11.3399 MODERATE NONPROLIFERATIVE DIABETIC RETINOPATHY WITHOUT MACULAR EDEMA ASSOCIATED WITH TYPE 2 DIABETES MELLITUS: Status: ACTIVE | Noted: 2018-11-05

## 2018-11-28 ENCOUNTER — TRANSCRIBE ORDERS (OUTPATIENT)
Dept: INTERNAL MEDICINE | Facility: CLINIC | Age: 58
End: 2018-11-28

## 2018-11-28 DIAGNOSIS — Z12.31 VISIT FOR SCREENING MAMMOGRAM: Primary | ICD-10-CM

## 2018-11-30 DIAGNOSIS — M62.838 MUSCLE SPASM: ICD-10-CM

## 2018-12-03 ENCOUNTER — APPOINTMENT (OUTPATIENT)
Dept: MAMMOGRAPHY | Facility: HOSPITAL | Age: 58
End: 2018-12-03

## 2018-12-03 RX ORDER — METHOCARBAMOL 500 MG/1
TABLET, FILM COATED ORAL
Qty: 90 TABLET | Refills: 0 | Status: SHIPPED | OUTPATIENT
Start: 2018-12-03 | End: 2019-03-08 | Stop reason: SDUPTHER

## 2019-01-07 DIAGNOSIS — K21.9 GASTROESOPHAGEAL REFLUX DISEASE, ESOPHAGITIS PRESENCE NOT SPECIFIED: ICD-10-CM

## 2019-01-08 RX ORDER — PANTOPRAZOLE SODIUM 40 MG/1
TABLET, DELAYED RELEASE ORAL
Qty: 90 TABLET | Refills: 0 | Status: SHIPPED | OUTPATIENT
Start: 2019-01-08 | End: 2019-06-03 | Stop reason: SDUPTHER

## 2019-02-12 ENCOUNTER — OFFICE VISIT (OUTPATIENT)
Dept: INTERNAL MEDICINE | Facility: CLINIC | Age: 59
End: 2019-02-12

## 2019-02-12 VITALS
DIASTOLIC BLOOD PRESSURE: 92 MMHG | OXYGEN SATURATION: 100 % | BODY MASS INDEX: 40.59 KG/M2 | HEART RATE: 79 BPM | HEIGHT: 61 IN | WEIGHT: 215 LBS | SYSTOLIC BLOOD PRESSURE: 138 MMHG | RESPIRATION RATE: 18 BRPM

## 2019-02-12 DIAGNOSIS — Z00.00 ANNUAL PHYSICAL EXAM: ICD-10-CM

## 2019-02-12 DIAGNOSIS — Z23 NEED FOR SHINGLES VACCINE: ICD-10-CM

## 2019-02-12 DIAGNOSIS — E66.01 OBESITY, CLASS III, BMI 40-49.9 (MORBID OBESITY) (HCC): ICD-10-CM

## 2019-02-12 DIAGNOSIS — Z79.4 TYPE 2 DIABETES MELLITUS WITH OTHER SPECIFIED COMPLICATION, WITH LONG-TERM CURRENT USE OF INSULIN (HCC): ICD-10-CM

## 2019-02-12 DIAGNOSIS — Z00.00 MEDICARE ANNUAL WELLNESS VISIT, INITIAL: Primary | ICD-10-CM

## 2019-02-12 DIAGNOSIS — G47.00 INSOMNIA, UNSPECIFIED TYPE: ICD-10-CM

## 2019-02-12 DIAGNOSIS — Z12.4 ENCOUNTER FOR PAPANICOLAOU SMEAR FOR CERVICAL CANCER SCREENING: ICD-10-CM

## 2019-02-12 DIAGNOSIS — Z23 NEED FOR PNEUMOCOCCAL VACCINATION: ICD-10-CM

## 2019-02-12 DIAGNOSIS — E11.69 TYPE 2 DIABETES MELLITUS WITH OTHER SPECIFIED COMPLICATION, WITH LONG-TERM CURRENT USE OF INSULIN (HCC): ICD-10-CM

## 2019-02-12 LAB
ALBUMIN SERPL-MCNC: 4.58 G/DL (ref 3.2–4.8)
ALBUMIN/GLOB SERPL: 2.1 G/DL (ref 1.5–2.5)
ALP SERPL-CCNC: 80 U/L (ref 25–100)
ALT SERPL W P-5'-P-CCNC: 26 U/L (ref 7–40)
ANION GAP SERPL CALCULATED.3IONS-SCNC: 3 MMOL/L (ref 3–11)
ARTICHOKE IGE QN: 131 MG/DL (ref 0–130)
AST SERPL-CCNC: 25 U/L (ref 0–33)
BASOPHILS # BLD AUTO: 0.04 10*3/MM3 (ref 0–0.2)
BASOPHILS NFR BLD AUTO: 0.6 % (ref 0–1)
BILIRUB SERPL-MCNC: 0.3 MG/DL (ref 0.3–1.2)
BUN BLD-MCNC: 18 MG/DL (ref 9–23)
BUN/CREAT SERPL: 17.5 (ref 7–25)
CALCIUM SPEC-SCNC: 9.3 MG/DL (ref 8.7–10.4)
CHLORIDE SERPL-SCNC: 105 MMOL/L (ref 99–109)
CHOLEST SERPL-MCNC: 221 MG/DL (ref 0–200)
CO2 SERPL-SCNC: 31 MMOL/L (ref 20–31)
CREAT BLD-MCNC: 1.03 MG/DL (ref 0.6–1.3)
DEPRECATED RDW RBC AUTO: 46.3 FL (ref 37–54)
EOSINOPHIL # BLD AUTO: 0.38 10*3/MM3 (ref 0–0.3)
EOSINOPHIL NFR BLD AUTO: 5.8 % (ref 0–3)
ERYTHROCYTE [DISTWIDTH] IN BLOOD BY AUTOMATED COUNT: 13.4 % (ref 11.3–14.5)
GFR SERPL CREATININE-BSD FRML MDRD: 66 ML/MIN/1.73
GLOBULIN UR ELPH-MCNC: 2.2 GM/DL
GLUCOSE BLD-MCNC: 69 MG/DL (ref 70–100)
HBA1C MFR BLD: 6.2 % (ref 4.8–5.6)
HCT VFR BLD AUTO: 42.3 % (ref 34.5–44)
HDLC SERPL-MCNC: 72 MG/DL (ref 40–60)
HGB BLD-MCNC: 13.5 G/DL (ref 11.5–15.5)
IMM GRANULOCYTES # BLD AUTO: 0.01 10*3/MM3 (ref 0–0.05)
IMM GRANULOCYTES NFR BLD AUTO: 0.2 % (ref 0–0.6)
LYMPHOCYTES # BLD AUTO: 2.01 10*3/MM3 (ref 0.6–4.8)
LYMPHOCYTES NFR BLD AUTO: 30.5 % (ref 24–44)
MCH RBC QN AUTO: 30.1 PG (ref 27–31)
MCHC RBC AUTO-ENTMCNC: 31.9 G/DL (ref 32–36)
MCV RBC AUTO: 94.4 FL (ref 80–99)
MONOCYTES # BLD AUTO: 0.4 10*3/MM3 (ref 0–1)
MONOCYTES NFR BLD AUTO: 6.1 % (ref 0–12)
NEUTROPHILS # BLD AUTO: 3.75 10*3/MM3 (ref 1.5–8.3)
NEUTROPHILS NFR BLD AUTO: 57 % (ref 41–71)
PLATELET # BLD AUTO: 345 10*3/MM3 (ref 150–450)
PMV BLD AUTO: 10.4 FL (ref 6–12)
POTASSIUM BLD-SCNC: 5 MMOL/L (ref 3.5–5.5)
PROT SERPL-MCNC: 6.8 G/DL (ref 5.7–8.2)
RBC # BLD AUTO: 4.48 10*6/MM3 (ref 3.89–5.14)
SODIUM BLD-SCNC: 139 MMOL/L (ref 132–146)
TRIGL SERPL-MCNC: 162 MG/DL (ref 0–150)
TSH SERPL DL<=0.05 MIU/L-ACNC: 1.64 MIU/ML (ref 0.35–5.35)
WBC NRBC COR # BLD: 6.58 10*3/MM3 (ref 3.5–10.8)

## 2019-02-12 PROCEDURE — 84443 ASSAY THYROID STIM HORMONE: CPT | Performed by: NURSE PRACTITIONER

## 2019-02-12 PROCEDURE — 80061 LIPID PANEL: CPT | Performed by: NURSE PRACTITIONER

## 2019-02-12 PROCEDURE — G0438 PPPS, INITIAL VISIT: HCPCS | Performed by: NURSE PRACTITIONER

## 2019-02-12 PROCEDURE — G0009 ADMIN PNEUMOCOCCAL VACCINE: HCPCS | Performed by: NURSE PRACTITIONER

## 2019-02-12 PROCEDURE — 85025 COMPLETE CBC W/AUTO DIFF WBC: CPT | Performed by: NURSE PRACTITIONER

## 2019-02-12 PROCEDURE — 83036 HEMOGLOBIN GLYCOSYLATED A1C: CPT | Performed by: NURSE PRACTITIONER

## 2019-02-12 PROCEDURE — 90732 PPSV23 VACC 2 YRS+ SUBQ/IM: CPT | Performed by: NURSE PRACTITIONER

## 2019-02-12 PROCEDURE — 99396 PREV VISIT EST AGE 40-64: CPT | Performed by: NURSE PRACTITIONER

## 2019-02-12 PROCEDURE — 80053 COMPREHEN METABOLIC PANEL: CPT | Performed by: NURSE PRACTITIONER

## 2019-02-12 RX ORDER — HYDROXYZINE HYDROCHLORIDE 25 MG/1
TABLET, FILM COATED ORAL
Qty: 60 TABLET | Refills: 2 | Status: SHIPPED | OUTPATIENT
Start: 2019-02-12 | End: 2019-06-03

## 2019-02-13 ENCOUNTER — TELEPHONE (OUTPATIENT)
Dept: INTERNAL MEDICINE | Facility: CLINIC | Age: 59
End: 2019-02-13

## 2019-02-13 DIAGNOSIS — E11.69 TYPE 2 DIABETES MELLITUS WITH OTHER SPECIFIED COMPLICATION, WITH LONG-TERM CURRENT USE OF INSULIN (HCC): Primary | ICD-10-CM

## 2019-02-13 DIAGNOSIS — Z79.4 TYPE 2 DIABETES MELLITUS WITH OTHER SPECIFIED COMPLICATION, WITH LONG-TERM CURRENT USE OF INSULIN (HCC): Primary | ICD-10-CM

## 2019-02-13 RX ORDER — INSULIN ASPART 100 [IU]/ML
INJECTION, SUSPENSION SUBCUTANEOUS
Qty: 3 ML | Refills: 5
Start: 2019-02-13 | End: 2019-08-09

## 2019-02-13 NOTE — TELEPHONE ENCOUNTER
Discussed that cholesterol was elevated but since she was not completely fasting we will just repeat this at her next FU- fasting.  Her A1C is down to 6.2 which is great!  She is followed by [facility] for this- she is having some lows- her insulin was not adjusted at her FU last week.  I have advised her to decrease 70/30 from 18 units bid to 18 units in the am and 16 units in the evening.  Pt verbalized understanding and denied further questions at this time.

## 2019-02-15 ENCOUNTER — TELEPHONE (OUTPATIENT)
Dept: INTERNAL MEDICINE | Facility: CLINIC | Age: 59
End: 2019-02-15

## 2019-02-19 ENCOUNTER — APPOINTMENT (OUTPATIENT)
Dept: OTHER | Facility: HOSPITAL | Age: 59
End: 2019-02-19

## 2019-02-19 ENCOUNTER — HOSPITAL ENCOUNTER (OUTPATIENT)
Dept: MAMMOGRAPHY | Facility: HOSPITAL | Age: 59
Discharge: HOME | End: 2019-02-19
Attending: NURSE PRACTITIONER | Admitting: NURSE PRACTITIONER

## 2019-02-19 DIAGNOSIS — Z12.31 VISIT FOR SCREENING MAMMOGRAM: ICD-10-CM

## 2019-02-19 PROCEDURE — 77063 BREAST TOMOSYNTHESIS BI: CPT | Performed by: RADIOLOGY

## 2019-02-19 PROCEDURE — 77063 BREAST TOMOSYNTHESIS BI: CPT

## 2019-02-19 PROCEDURE — 77067 SCR MAMMO BI INCL CAD: CPT | Performed by: RADIOLOGY

## 2019-02-19 PROCEDURE — 77067 SCR MAMMO BI INCL CAD: CPT

## 2019-03-08 DIAGNOSIS — M62.838 MUSCLE SPASM: ICD-10-CM

## 2019-03-08 RX ORDER — METHOCARBAMOL 500 MG/1
TABLET, FILM COATED ORAL
Qty: 90 TABLET | Refills: 0 | Status: SHIPPED | OUTPATIENT
Start: 2019-03-08 | End: 2019-05-03 | Stop reason: SDUPTHER

## 2019-05-03 DIAGNOSIS — M62.838 MUSCLE SPASM: ICD-10-CM

## 2019-05-03 RX ORDER — METHOCARBAMOL 500 MG/1
TABLET, FILM COATED ORAL
Qty: 90 TABLET | Refills: 0 | Status: SHIPPED | OUTPATIENT
Start: 2019-05-03 | End: 2019-07-18 | Stop reason: SDUPTHER

## 2019-06-03 ENCOUNTER — OFFICE VISIT (OUTPATIENT)
Dept: INTERNAL MEDICINE | Facility: CLINIC | Age: 59
End: 2019-06-03

## 2019-06-03 VITALS
RESPIRATION RATE: 20 BRPM | TEMPERATURE: 97.2 F | DIASTOLIC BLOOD PRESSURE: 68 MMHG | SYSTOLIC BLOOD PRESSURE: 130 MMHG | OXYGEN SATURATION: 100 % | WEIGHT: 214.38 LBS | HEIGHT: 61 IN | HEART RATE: 72 BPM | BODY MASS INDEX: 40.47 KG/M2

## 2019-06-03 DIAGNOSIS — F51.01 PRIMARY INSOMNIA: Primary | ICD-10-CM

## 2019-06-03 DIAGNOSIS — E66.01 CLASS 3 SEVERE OBESITY DUE TO EXCESS CALORIES WITH SERIOUS COMORBIDITY AND BODY MASS INDEX (BMI) OF 40.0 TO 44.9 IN ADULT (HCC): ICD-10-CM

## 2019-06-03 DIAGNOSIS — K21.9 GASTROESOPHAGEAL REFLUX DISEASE, ESOPHAGITIS PRESENCE NOT SPECIFIED: ICD-10-CM

## 2019-06-03 DIAGNOSIS — F32.0 CURRENT MILD EPISODE OF MAJOR DEPRESSIVE DISORDER WITHOUT PRIOR EPISODE (HCC): ICD-10-CM

## 2019-06-03 PROBLEM — E66.813 CLASS 3 SEVERE OBESITY DUE TO EXCESS CALORIES WITH SERIOUS COMORBIDITY AND BODY MASS INDEX (BMI) OF 40.0 TO 44.9 IN ADULT: Status: ACTIVE | Noted: 2019-06-03

## 2019-06-03 PROCEDURE — 99214 OFFICE O/P EST MOD 30 MIN: CPT | Performed by: NURSE PRACTITIONER

## 2019-06-03 RX ORDER — TRAZODONE HYDROCHLORIDE 50 MG/1
TABLET ORAL
Qty: 60 TABLET | Refills: 6 | Status: SHIPPED | OUTPATIENT
Start: 2019-06-03 | End: 2019-08-09 | Stop reason: SDUPTHER

## 2019-06-03 RX ORDER — PANTOPRAZOLE SODIUM 40 MG/1
40 TABLET, DELAYED RELEASE ORAL DAILY
Qty: 90 TABLET | Refills: 1 | Status: SHIPPED | OUTPATIENT
Start: 2019-06-03 | End: 2019-08-09 | Stop reason: SDUPTHER

## 2019-06-03 RX ORDER — BUPROPION HYDROCHLORIDE 150 MG/1
150 TABLET ORAL DAILY
Qty: 30 TABLET | Refills: 6 | Status: SHIPPED | OUTPATIENT
Start: 2019-06-03 | End: 2019-08-09 | Stop reason: SDUPTHER

## 2019-06-03 RX ORDER — ROSUVASTATIN CALCIUM 40 MG/1
40 TABLET, COATED ORAL DAILY
Refills: 3 | COMMUNITY
Start: 2019-05-19 | End: 2019-08-09 | Stop reason: SDUPTHER

## 2019-06-03 NOTE — PROGRESS NOTES
Subjective   Jayna Ambrocio is a 58 y.o. female here today for DM    Chief Complaint   Patient presents with   • Diabetes     follow-up   Jayna is here today for a FU on DM- she has been checking her blood sugars- they are ranging in the 150-170's. She denies any adverse SA of her medications.  She sees Dr. Camejo for cardiology and endocrinology at  (next month).  She is having trouble with weight loss. She has cut out all carbs and still has not lost weight. She is depressed about her weight- unable to lose it.  Was unable to go to weight loss clinic due to finances.      She also reports trouble sleeping- atarax is not helping- even when she takes two.  She has both trouble falling asleep and staying asleep.      Review of Systems   Constitutional: Negative for activity change, appetite change, fatigue, unexpected weight gain and unexpected weight loss.        Difficulty losing weight   Eyes: Negative for blurred vision and double vision.   Respiratory: Negative for chest tightness and shortness of breath.    Cardiovascular: Negative for chest pain and leg swelling.   Gastrointestinal: Negative for diarrhea, nausea and vomiting.   Endocrine: Negative for cold intolerance, heat intolerance, polydipsia, polyphagia and polyuria.   Skin: Negative for rash and skin lesions.   Neurological: Negative for dizziness, seizures, syncope, facial asymmetry, weakness, light-headedness, headache, memory problem and confusion.   Psychiatric/Behavioral: Positive for sleep disturbance. Negative for suicidal ideas.       Past Medical History:   Diagnosis Date   • Arthritis     bilateral hands   • Coronary artery disease    • Diabetes mellitus (CMS/HCC)    • Heart failure (CMS/HCC)    • Migraines      Family History   Problem Relation Age of Onset   • Cancer Mother         Unsure what kind   • Diabetes Mother    • No Known Problems Father    • Diabetes Sister    • Cancer Sister         Unsure what kind   • Breast cancer Sister  "63   • Coronary artery disease Brother    • No Known Problems Maternal Aunt    • No Known Problems Maternal Uncle    • No Known Problems Paternal Aunt    • No Known Problems Paternal Uncle    • No Known Problems Maternal Grandmother    • No Known Problems Maternal Grandfather    • No Known Problems Paternal Grandmother    • No Known Problems Paternal Grandfather    • Diabetes Sister    • No Known Problems Daughter    • Diabetes Son    • No Known Problems Son    • Ovarian cancer Neg Hx      Past Surgical History:   Procedure Laterality Date   • CORONARY ARTERY BYPASS GRAFT  2018    4 vessel   • FOOT SURGERY Left 03/15/2012     Social History     Socioeconomic History   • Marital status: Single     Spouse name: Not on file   • Number of children: 3   • Years of education: Not on file   • Highest education level: Not on file   Tobacco Use   • Smoking status: Former Smoker     Types: Cigarettes     Start date:      Last attempt to quit:      Years since quittin.4   • Smokeless tobacco: Never Used   • Tobacco comment: \"1 pack could last 2 weeks\"   Substance and Sexual Activity   • Alcohol use: No   • Drug use: No   • Sexual activity: Not Currently     Partners: Male         Current Outpatient Medications:   •  amiodarone (PACERONE) 200 MG tablet, Take 1 tablet by mouth Daily., Disp: 90 tablet, Rfl: 2  •  aspirin 81 MG tablet, Take 1 tablet by mouth Daily., Disp: 90 tablet, Rfl: 2  •  atorvastatin (LIPITOR) 80 MG tablet, Take 1 tablet by mouth Daily., Disp: 30 tablet, Rfl: 2  •  B-D ULTRAFINE III SHORT PEN 31G X 8 MM misc, Inject 1 unit marking on U-100 syringe as directed 2 (Two) Times a Day., Disp: 90 each, Rfl: 2  •  clopidogrel (PLAVIX) 75 MG tablet, Take 1 tablet by mouth Daily. , Disp: 90 tablet, Rfl: 2  •  FREESTYLE LITE test strip, Use as instructed, Disp: 90 each, Rfl: 5  •  furosemide (LASIX) 40 MG tablet, Take 1 tablet by mouth Daily., Disp: 90 tablet, Rfl: 2  •  Insulin Lispro Prot & " "Lispro (HUMALOG MIX 75/25 KWIKPEN) (75-25) 100 UNIT/ML suspension pen-injector, Inject 20 Units under the skin into the appropriate area as directed 2 (Two) Times a Day., Disp: 5 pen, Rfl: 3  •  isosorbide mononitrate (IMDUR) 60 MG 24 hr tablet, Take 1 tablet by mouth Every Morning., Disp: 90 tablet, Rfl: 2  •  methocarbamol (ROBAXIN) 500 MG tablet, TAKE 1 TABLET BY MOUTH THREE TIMES A DAY AS NEEDED FOR MUSCLE SPASM, Disp: 90 tablet, Rfl: 0  •  metoprolol tartrate (LOPRESSOR) 25 MG tablet, Take 1 tablet by mouth Daily. Take 2 bid, Disp: 60 tablet, Rfl: 0  •  metoprolol tartrate (LOPRESSOR) 25 MG tablet, TAKE 1 TABLET BY MOUTH TWO TIMES A DAY , Disp: 60 tablet, Rfl: 2  •  NOVOLOG MIX 70/30 FLEXPEN (70-30) 100 UNIT/ML suspension pen-injector injection, 18 units in the am, 16 units in the pm with meals, Disp: 3 mL, Rfl: 5  •  pantoprazole (PROTONIX) 40 MG EC tablet, Take 1 tablet by mouth Daily., Disp: 90 tablet, Rfl: 1  •  rosuvastatin (CRESTOR) 40 MG tablet, Take 40 mg by mouth Daily. 200001, Disp: , Rfl: 3  •  sodium-potassium-magnesium sulfates (SUPREP BOWEL PREP KIT) 17.5-3.13-1.6 GM/180ML solution oral solution, Dispense 1 kit. Follow instructions that were mailed to your home. If you didn't receive these call [phone]., Disp: 2 bottle, Rfl: 0  •  traMADol (ULTRAM) 50 MG tablet, Take 50 mg by mouth Every 4 (Four) Hours As Needed for Moderate Pain ., Disp: , Rfl:   •  buPROPion XL (WELLBUTRIN XL) 150 MG 24 hr tablet, Take 1 tablet by mouth Daily., Disp: 30 tablet, Rfl: 6  •  traZODone (DESYREL) 50 MG tablet, Take 1-2 tabs nightly as needed for insomnia, Disp: 60 tablet, Rfl: 6    Objective   Vitals:    06/03/19 0957   BP: 130/68   BP Location: Left arm   Patient Position: Sitting   Cuff Size: Large Adult   Pulse: 72   Resp: 20   Temp: 97.2 °F (36.2 °C)   TempSrc: Temporal   SpO2: 100%   Weight: 97.2 kg (214 lb 6 oz)   Height: 154.9 cm (60.98\")   PainSc: 0-No pain     Body mass index is 40.53 kg/m².  Physical " Exam   Constitutional: She is oriented to person, place, and time. She appears well-developed and well-nourished. No distress.   Eyes: Pupils are equal, round, and reactive to light.   Neck: Neck supple. No thyromegaly present.   Cardiovascular: Normal rate and regular rhythm.   Pulmonary/Chest: Effort normal and breath sounds normal.   Neurological: She is alert and oriented to person, place, and time.   Skin: Skin is warm and dry. Capillary refill takes 2 to 3 seconds. She is not diaphoretic.   Psychiatric: She has a normal mood and affect. Her behavior is normal. Judgment and thought content normal.   Nursing note and vitals reviewed.      Assessment/Plan   Problem List Items Addressed This Visit        Digestive    Gastroesophageal reflux disease    Relevant Medications    pantoprazole (PROTONIX) 40 MG EC tablet    Class 3 severe obesity due to excess calories with serious comorbidity and body mass index (BMI) of 40.0 to 44.9 in adult (CMS/[facility])       Other    Insomnia - Primary    Relevant Medications    traZODone (DESYREL) 50 MG tablet      Other Visit Diagnoses     Current mild episode of major depressive disorder without prior episode (CMS/[facility])        Relevant Medications    traZODone (DESYREL) 50 MG tablet    buPROPion XL (WELLBUTRIN XL) 150 MG 24 hr tablet        Jayna was seen today for diabetes.    Diagnoses and all orders for this visit:    Primary insomnia  -     traZODone (DESYREL) 50 MG tablet; Take 1-2 tabs nightly as needed for insomnia  Discontinue hydroxyzine, start trazodone.  May take 1 to 2 tablets as needed for insomnia.  Current mild episode of major depressive disorder without prior episode (CMS/[facility])  -     buPROPion XL (WELLBUTRIN XL) 150 MG 24 hr tablet; Take 1 tablet by mouth Daily.  Try Wellbutrin to see if this helps give her some energy and help with weight loss.  Gastroesophageal reflux disease, esophagitis presence not specified  -     pantoprazole (PROTONIX) 40 MG EC tablet; Take  1 tablet by mouth Daily.  Refill requested today  Class 3 severe obesity due to excess calories with serious comorbidity and body mass index (BMI) of 40.0 to 44.9 in adult (CMS/[facility])  Discussed portion control.  Patient does reportedly eat very healthy.  Discussed decreased caloric need over time.  May need to count calories.           The patient was counseled regarding diagnostic results, impressions, prognosis, instructions for management, risk factor reductions, education, and importance of treatment compliance.  The patient verbalized understanding of and agreement with the plan of care.    Advised patient to call with any further questions and any new or worsening symptoms.     Return in about 3 months (around 9/3/2019).      ROCCO Alejandra    Please note that portions of this note were completed with a voice recognition program. Efforts were made to edit the dictations, but occasionally words are mistranscribed.

## 2019-07-18 DIAGNOSIS — M62.838 MUSCLE SPASM: ICD-10-CM

## 2019-07-18 RX ORDER — METHOCARBAMOL 500 MG/1
TABLET, FILM COATED ORAL
Qty: 90 TABLET | Refills: 0 | Status: SHIPPED | OUTPATIENT
Start: 2019-07-18 | End: 2019-08-09 | Stop reason: SDUPTHER

## 2019-08-09 ENCOUNTER — OFFICE VISIT (OUTPATIENT)
Dept: INTERNAL MEDICINE | Facility: CLINIC | Age: 59
End: 2019-08-09

## 2019-08-09 VITALS
WEIGHT: 212 LBS | TEMPERATURE: 98.3 F | OXYGEN SATURATION: 98 % | SYSTOLIC BLOOD PRESSURE: 130 MMHG | RESPIRATION RATE: 16 BRPM | HEIGHT: 60 IN | HEART RATE: 76 BPM | BODY MASS INDEX: 41.62 KG/M2 | DIASTOLIC BLOOD PRESSURE: 86 MMHG

## 2019-08-09 DIAGNOSIS — Z78.0 POST-MENOPAUSAL: ICD-10-CM

## 2019-08-09 DIAGNOSIS — F51.01 PRIMARY INSOMNIA: ICD-10-CM

## 2019-08-09 DIAGNOSIS — I73.9 PAD (PERIPHERAL ARTERY DISEASE) (HCC): ICD-10-CM

## 2019-08-09 DIAGNOSIS — I25.810 CORONARY ARTERY DISEASE INVOLVING CORONARY BYPASS GRAFT WITHOUT ANGINA PECTORIS, UNSPECIFIED WHETHER NATIVE OR TRANSPLANTED HEART: ICD-10-CM

## 2019-08-09 DIAGNOSIS — E11.69 TYPE 2 DIABETES MELLITUS WITH OTHER SPECIFIED COMPLICATION, WITH LONG-TERM CURRENT USE OF INSULIN (HCC): ICD-10-CM

## 2019-08-09 DIAGNOSIS — M62.838 MUSCLE SPASM: ICD-10-CM

## 2019-08-09 DIAGNOSIS — Z79.4 TYPE 2 DIABETES MELLITUS WITH OTHER SPECIFIED COMPLICATION, WITH LONG-TERM CURRENT USE OF INSULIN (HCC): ICD-10-CM

## 2019-08-09 DIAGNOSIS — K21.9 GASTROESOPHAGEAL REFLUX DISEASE, ESOPHAGITIS PRESENCE NOT SPECIFIED: ICD-10-CM

## 2019-08-09 DIAGNOSIS — Z13.220 LIPID SCREENING: ICD-10-CM

## 2019-08-09 DIAGNOSIS — G47.00 INSOMNIA, UNSPECIFIED TYPE: ICD-10-CM

## 2019-08-09 DIAGNOSIS — E78.5 HYPERLIPIDEMIA, UNSPECIFIED HYPERLIPIDEMIA TYPE: Primary | ICD-10-CM

## 2019-08-09 DIAGNOSIS — I25.708 CORONARY ARTERY DISEASE OF BYPASS GRAFT OF NATIVE HEART WITH STABLE ANGINA PECTORIS (HCC): ICD-10-CM

## 2019-08-09 DIAGNOSIS — E11.3399 MODERATE NONPROLIFERATIVE DIABETIC RETINOPATHY WITHOUT MACULAR EDEMA ASSOCIATED WITH TYPE 2 DIABETES MELLITUS, UNSPECIFIED LATERALITY (HCC): Primary | ICD-10-CM

## 2019-08-09 DIAGNOSIS — I10 ESSENTIAL HYPERTENSION: ICD-10-CM

## 2019-08-09 DIAGNOSIS — F32.0 CURRENT MILD EPISODE OF MAJOR DEPRESSIVE DISORDER WITHOUT PRIOR EPISODE (HCC): ICD-10-CM

## 2019-08-09 PROBLEM — Z86.79 HISTORY OF ATRIAL FIBRILLATION: Status: ACTIVE | Noted: 2019-08-09

## 2019-08-09 PROBLEM — Z95.1 S/P CABG (CORONARY ARTERY BYPASS GRAFT): Status: ACTIVE | Noted: 2019-08-09

## 2019-08-09 LAB
ALBUMIN SERPL-MCNC: 4.6 G/DL (ref 3.5–5.2)
ALBUMIN/GLOB SERPL: 1.6 G/DL
ALP SERPL-CCNC: 67 U/L (ref 39–117)
ALT SERPL W P-5'-P-CCNC: 32 U/L (ref 1–33)
ANION GAP SERPL CALCULATED.3IONS-SCNC: 9.7 MMOL/L (ref 5–15)
AST SERPL-CCNC: 25 U/L (ref 1–32)
BILIRUB SERPL-MCNC: 0.2 MG/DL (ref 0.2–1.2)
BUN BLD-MCNC: 19 MG/DL (ref 6–20)
BUN/CREAT SERPL: 14.5 (ref 7–25)
CALCIUM SPEC-SCNC: 9.5 MG/DL (ref 8.6–10.5)
CHLORIDE SERPL-SCNC: 98 MMOL/L (ref 98–107)
CHOLEST SERPL-MCNC: 111 MG/DL (ref 0–200)
CO2 SERPL-SCNC: 28.3 MMOL/L (ref 22–29)
CREAT BLD-MCNC: 1.31 MG/DL (ref 0.57–1)
GFR SERPL CREATININE-BSD FRML MDRD: 50 ML/MIN/1.73
GLOBULIN UR ELPH-MCNC: 2.9 GM/DL
GLUCOSE BLD-MCNC: 90 MG/DL (ref 65–99)
HDLC SERPL-MCNC: 49 MG/DL (ref 40–60)
LDLC SERPL CALC-MCNC: 36 MG/DL (ref 0–100)
LDLC/HDLC SERPL: 0.73 {RATIO}
POTASSIUM BLD-SCNC: 4.7 MMOL/L (ref 3.5–5.2)
PROT SERPL-MCNC: 7.5 G/DL (ref 6–8.5)
SODIUM BLD-SCNC: 136 MMOL/L (ref 136–145)
TRIGL SERPL-MCNC: 132 MG/DL (ref 0–150)
VLDLC SERPL-MCNC: 26.4 MG/DL (ref 5–40)

## 2019-08-09 PROCEDURE — 85025 COMPLETE CBC W/AUTO DIFF WBC: CPT | Performed by: NURSE PRACTITIONER

## 2019-08-09 PROCEDURE — 80053 COMPREHEN METABOLIC PANEL: CPT | Performed by: NURSE PRACTITIONER

## 2019-08-09 PROCEDURE — 99214 OFFICE O/P EST MOD 30 MIN: CPT | Performed by: NURSE PRACTITIONER

## 2019-08-09 PROCEDURE — 80061 LIPID PANEL: CPT | Performed by: NURSE PRACTITIONER

## 2019-08-09 RX ORDER — INSULIN LISPRO 100 [IU]/ML
20 INJECTION, SUSPENSION SUBCUTANEOUS 2 TIMES DAILY
Qty: 5 PEN | Refills: 3 | Status: SHIPPED | OUTPATIENT
Start: 2019-08-09 | End: 2021-09-16 | Stop reason: SDUPTHER

## 2019-08-09 RX ORDER — CLOPIDOGREL BISULFATE 75 MG/1
75 TABLET ORAL DAILY
Qty: 90 TABLET | Refills: 2 | Status: SHIPPED | OUTPATIENT
Start: 2019-08-09 | End: 2020-07-29

## 2019-08-09 RX ORDER — ISOSORBIDE MONONITRATE 60 MG/1
60 TABLET, EXTENDED RELEASE ORAL EVERY MORNING
Qty: 90 TABLET | Refills: 2 | Status: SHIPPED | OUTPATIENT
Start: 2019-08-09 | End: 2021-09-16 | Stop reason: SDUPTHER

## 2019-08-09 RX ORDER — BLOOD-GLUCOSE METER
EACH MISCELLANEOUS
Refills: 5 | COMMUNITY
Start: 2019-07-13

## 2019-08-09 RX ORDER — PANTOPRAZOLE SODIUM 40 MG/1
40 TABLET, DELAYED RELEASE ORAL DAILY
Qty: 90 TABLET | Refills: 1 | Status: SHIPPED | OUTPATIENT
Start: 2019-08-09 | End: 2020-06-29

## 2019-08-09 RX ORDER — BUPROPION HYDROCHLORIDE 300 MG/1
300 TABLET ORAL DAILY
Qty: 90 TABLET | Refills: 1 | Status: SHIPPED | OUTPATIENT
Start: 2019-08-09 | End: 2020-01-30 | Stop reason: SDUPTHER

## 2019-08-09 RX ORDER — TRAZODONE HYDROCHLORIDE 100 MG/1
TABLET ORAL
Qty: 60 TABLET | Refills: 2 | Status: SHIPPED | OUTPATIENT
Start: 2019-08-09 | End: 2020-02-10

## 2019-08-09 RX ORDER — PEN NEEDLE, DIABETIC 31 GX5/16"
1 NEEDLE, DISPOSABLE MISCELLANEOUS 2 TIMES DAILY
Qty: 90 EACH | Refills: 2 | Status: SHIPPED | OUTPATIENT
Start: 2019-08-09 | End: 2021-09-16 | Stop reason: SDUPTHER

## 2019-08-09 RX ORDER — LANCETS
EACH MISCELLANEOUS
Refills: 5 | COMMUNITY
Start: 2019-07-13

## 2019-08-09 RX ORDER — ROSUVASTATIN CALCIUM 40 MG/1
40 TABLET, COATED ORAL DAILY
Qty: 30 TABLET | Refills: 5 | Status: SHIPPED | OUTPATIENT
Start: 2019-08-09 | End: 2021-09-16 | Stop reason: SDUPTHER

## 2019-08-09 RX ORDER — FLURBIPROFEN SODIUM 0.3 MG/ML
SOLUTION/ DROPS OPHTHALMIC
Refills: 11 | COMMUNITY
Start: 2019-07-06 | End: 2019-08-09 | Stop reason: SDUPTHER

## 2019-08-09 RX ORDER — TRAZODONE HYDROCHLORIDE 50 MG/1
TABLET ORAL
Qty: 60 TABLET | Refills: 6 | Status: SHIPPED | OUTPATIENT
Start: 2019-08-09 | End: 2019-08-09 | Stop reason: SDUPTHER

## 2019-08-09 RX ORDER — FLURBIPROFEN SODIUM 0.3 MG/ML
SOLUTION/ DROPS OPHTHALMIC
Qty: 60 EACH | Refills: 11 | Status: SHIPPED | OUTPATIENT
Start: 2019-08-09

## 2019-08-09 RX ORDER — METHOCARBAMOL 500 MG/1
500 TABLET, FILM COATED ORAL 3 TIMES DAILY PRN
Qty: 90 TABLET | Refills: 0 | Status: SHIPPED | OUTPATIENT
Start: 2019-08-09 | End: 2020-01-02

## 2019-08-09 NOTE — PROGRESS NOTES
Subjective   Jayna Ambrocio is a 58 y.o. female here today for insomnia .    Chief Complaint   Patient presents with   • Insomnia     better   • Diabetes   Jayna is here today for diabetes, CAD, obesity, insomnia.  She has been compliant with her medication and has seen a cardiologist and endocrinologist last week.  Her A1c was 7.3 and she was told to continue with her current treatment plan and modify her carbohydrates.  Her cardiologist recommended a lipid panel-if not at goal she may need to try PSK 9 inhibitor.  She reports the Wellbutrin is somewhat helpful for losing weight, but she cannot sustain it.  She will lose a couple pounds and then regained it back.  She is interested in increasing this dose.  She does appear to have lost an inch in height. Not taking calcium and vit d- has not had BMD.  He is sleeping better with the trazodone.      Review of Systems   Constitutional: Negative for activity change, appetite change, diaphoresis, fatigue, unexpected weight gain and unexpected weight loss.   HENT: Negative for nosebleeds and trouble swallowing.    Eyes: Negative for blurred vision, double vision and visual disturbance.   Respiratory: Negative for chest tightness and shortness of breath.    Cardiovascular: Negative for chest pain, palpitations and leg swelling.   Gastrointestinal: Negative for blood in stool, diarrhea, nausea and vomiting.   Endocrine: Negative for cold intolerance, heat intolerance, polydipsia, polyphagia and polyuria.   Skin: Negative for rash and skin lesions.   Neurological: Negative for dizziness, seizures, syncope, facial asymmetry, weakness, light-headedness, headache, memory problem and confusion.   Psychiatric/Behavioral: Positive for sleep disturbance. Negative for suicidal ideas, depressed mood and stress.       Past Medical History:   Diagnosis Date   • Arthritis     bilateral hands   • Coronary artery disease    • Diabetes mellitus (CMS/[facility])    • Heart failure (CMS/[facility])   "  • Migraines      Family History   Problem Relation Age of Onset   • Cancer Mother         Unsure what kind   • Diabetes Mother    • No Known Problems Father    • Diabetes Sister    • Cancer Sister         Unsure what kind   • Breast cancer Sister 63   • Coronary artery disease Brother    • No Known Problems Maternal Aunt    • No Known Problems Maternal Uncle    • No Known Problems Paternal Aunt    • No Known Problems Paternal Uncle    • No Known Problems Maternal Grandmother    • No Known Problems Maternal Grandfather    • No Known Problems Paternal Grandmother    • No Known Problems Paternal Grandfather    • Diabetes Sister    • No Known Problems Daughter    • Diabetes Son    • No Known Problems Son    • Ovarian cancer Neg Hx      Past Surgical History:   Procedure Laterality Date   • CORONARY ARTERY BYPASS GRAFT  2018    4 vessel   • FOOT SURGERY Left 03/15/2012     Social History     Socioeconomic History   • Marital status: Single     Spouse name: Not on file   • Number of children: 3   • Years of education: Not on file   • Highest education level: Not on file   Tobacco Use   • Smoking status: Former Smoker     Types: Cigarettes     Start date:      Last attempt to quit:      Years since quittin.6   • Smokeless tobacco: Never Used   • Tobacco comment: \"1 pack could last 2 weeks\"   Substance and Sexual Activity   • Alcohol use: No   • Drug use: No   • Sexual activity: Not Currently     Partners: Male         Current Outpatient Medications:   •  ACCU-CHEK SOFTCLIX LANCETS lancets, USE TO TEST BLOOD GLUCOSE THREE TIMES DAILY AS DIRECTED, Disp: , Rfl: 5  •  aspirin 81 MG tablet, Take 1 tablet by mouth Daily., Disp: 90 tablet, Rfl: 2  •  B-D UF III MINI PEN NEEDLES 31G X 5 MM misc, Use with insulin twice a day, Disp: 60 each, Rfl: 11  •  B-D ULTRAFINE III SHORT PEN 31G X 8 MM misc, Inject 1 unit marking on U-100 syringe as directed 2 (Two) Times a Day., Disp: 90 each, Rfl: 2  •  Blood Glucose " "Monitoring Suppl (ACCU-CHEK MARIO PLUS) w/Device kit, use to test blood glucose three times daily, Disp: , Rfl: 5  •  buPROPion XL (WELLBUTRIN XL) 300 MG 24 hr tablet, Take 1 tablet by mouth Daily., Disp: 90 tablet, Rfl: 1  •  Calcium Carb-Cholecalciferol (CALCIUM-VITAMIN D) 500-200 MG-UNIT per tablet, Take 1 tablet by mouth 2 (Two) Times a Day With Meals., Disp: 30 tablet, Rfl: 11  •  clopidogrel (PLAVIX) 75 MG tablet, Take 1 tablet by mouth Daily. 200001, Disp: 90 tablet, Rfl: 2  •  FREESTYLE LITE test strip, Use as instructed, Disp: 90 each, Rfl: 5  •  Insulin Lispro Prot & Lispro (HUMALOG MIX 75/25 KWIKPEN) (75-25) 100 UNIT/ML suspension pen-injector pen, Inject 20 Units under the skin into the appropriate area as directed 2 (Two) Times a Day., Disp: 5 pen, Rfl: 3  •  isosorbide mononitrate (IMDUR) 60 MG 24 hr tablet, Take 1 tablet by mouth Every Morning., Disp: 90 tablet, Rfl: 2  •  methocarbamol (ROBAXIN) 500 MG tablet, Take 1 tablet by mouth 3 (Three) Times a Day As Needed for Muscle Spasms., Disp: 90 tablet, Rfl: 0  •  metoprolol tartrate (LOPRESSOR) 25 MG tablet, Take 2 tablets by mouth Daily., Disp: 60 tablet, Rfl: 5  •  pantoprazole (PROTONIX) 40 MG EC tablet, Take 1 tablet by mouth Daily., Disp: 90 tablet, Rfl: 1  •  rosuvastatin (CRESTOR) 40 MG tablet, Take 1 tablet by mouth Daily. 200001, Disp: 30 tablet, Rfl: 5  •  traZODone (DESYREL) 100 MG tablet, Take 1-2 tabs nightly as needed for insomnia, Disp: 60 tablet, Rfl: 2    Objective   Vitals:    08/09/19 1254   BP: 130/86   Pulse: 76   Resp: 16   Temp: 98.3 °F (36.8 °C)   TempSrc: Temporal   SpO2: 98%   Weight: 96.2 kg (212 lb)   Height: 152.4 cm (60\")     Body mass index is 41.4 kg/m².  Physical Exam   Constitutional: She is oriented to person, place, and time. She appears well-developed and well-nourished. No distress.   Eyes: Pupils are equal, round, and reactive to light.   Neck: Neck supple. No thyromegaly present.   Cardiovascular: Normal rate and " regular rhythm.   Pulmonary/Chest: Effort normal and breath sounds normal.   Neurological: She is alert and oriented to person, place, and time.   Skin: Skin is warm and dry. Capillary refill takes 2 to 3 seconds. She is not diaphoretic.   Psychiatric: She has a normal mood and affect. Her behavior is normal. Judgment and thought content normal.   Nursing note and vitals reviewed.      Assessment/Plan   Problem List Items Addressed This Visit        Cardiovascular and Mediastinum    Coronary artery disease    Relevant Medications    clopidogrel (PLAVIX) 75 MG tablet    rosuvastatin (CRESTOR) 40 MG tablet    isosorbide mononitrate (IMDUR) 60 MG 24 hr tablet    aspirin 81 MG tablet    metoprolol tartrate (LOPRESSOR) 25 MG tablet    Essential hypertension    Relevant Medications    metoprolol tartrate (LOPRESSOR) 25 MG tablet    Other Relevant Orders    CBC & Differential    Comprehensive Metabolic Panel    CBC Auto Differential    Coronary artery disease of bypass graft of native heart with stable angina pectoris (CMS/HCC)    Relevant Medications    clopidogrel (PLAVIX) 75 MG tablet    isosorbide mononitrate (IMDUR) 60 MG 24 hr tablet    metoprolol tartrate (LOPRESSOR) 25 MG tablet    PAD (peripheral artery disease) (CMS/HCC)    Relevant Medications    rosuvastatin (CRESTOR) 40 MG tablet       Digestive    Gastroesophageal reflux disease    Relevant Medications    pantoprazole (PROTONIX) 40 MG EC tablet       Endocrine    Type 2 diabetes mellitus (CMS/HCC)    Relevant Medications    FREESTYLE LITE test strip    Blood Glucose Monitoring Suppl (ACCU-CHEK MARIO PLUS) w/Device kit    ACCU-CHEK SOFTCLIX LANCETS lancets    B-D ULTRAFINE III SHORT PEN 31G X 8 MM misc    B-D UF III MINI PEN NEEDLES 31G X 5 MM misc    Insulin Lispro Prot & Lispro (HUMALOG MIX 75/25 KWIKPEN) (75-25) 100 UNIT/ML suspension pen-injector pen    Moderate nonproliferative diabetic retinopathy without macular edema associated with type 2 diabetes  mellitus (CMS/[facility]) - Primary    Relevant Medications    Insulin Lispro Prot & Lispro (HUMALOG MIX 75/25 KWIKPEN) (75-25) 100 UNIT/ML suspension pen-injector pen       Other    Insomnia    Relevant Medications    traZODone (DESYREL) 100 MG tablet      Other Visit Diagnoses     Current mild episode of major depressive disorder without prior episode (CMS/[facility])        Relevant Medications    buPROPion XL (WELLBUTRIN XL) 300 MG 24 hr tablet    traZODone (DESYREL) 100 MG tablet    Muscle spasm        Relevant Medications    methocarbamol (ROBAXIN) 500 MG tablet    Post-menopausal        Relevant Medications    Calcium Carb-Cholecalciferol (CALCIUM-VITAMIN D) 500-200 MG-UNIT per tablet    Other Relevant Orders    DEXA Bone Density Axial    Lipid screening        Relevant Orders    Lipid Panel        Jayna was seen today for insomnia and diabetes.    Diagnoses and all orders for this visit:    Moderate nonproliferative diabetic retinopathy without macular edema associated with type 2 diabetes mellitus, unspecified laterality (CMS/[facility])    Essential hypertension  -     Discontinue: metoprolol tartrate (LOPRESSOR) 25 MG tablet; Take 1 tablet by mouth Daily. Take 2 bid  -     metoprolol tartrate (LOPRESSOR) 25 MG tablet; Take 2 tablets by mouth Daily.  -     CBC & Differential  -     Comprehensive Metabolic Panel  -     CBC Auto Differential    PAD (peripheral artery disease) (CMS/[facility])  -     rosuvastatin (CRESTOR) 40 MG tablet; Take 1 tablet by mouth Daily. 200001    Coronary artery disease involving coronary bypass graft without angina pectoris, unspecified whether native or transplanted heart  -     clopidogrel (PLAVIX) 75 MG tablet; Take 1 tablet by mouth Daily. 200001  -     rosuvastatin (CRESTOR) 40 MG tablet; Take 1 tablet by mouth Daily. 200001  -     Discontinue: metoprolol tartrate (LOPRESSOR) 25 MG tablet; Take 1 tablet by mouth Daily. Take 2 bid  -     isosorbide mononitrate (IMDUR) 60 MG 24 hr tablet; Take 1  tablet by mouth Every Morning.  -     aspirin 81 MG tablet; Take 1 tablet by mouth Daily.  -     metoprolol tartrate (LOPRESSOR) 25 MG tablet; Take 2 tablets by mouth Daily.    Current mild episode of major depressive disorder without prior episode (CMS/[facility])  -     buPROPion XL (WELLBUTRIN XL) 300 MG 24 hr tablet; Take 1 tablet by mouth Daily.    Type 2 diabetes mellitus with other specified complication, with long-term current use of insulin (CMS/[facility])  -     B-D ULTRAFINE III SHORT PEN 31G X 8 MM misc; Inject 1 unit marking on U-100 syringe as directed 2 (Two) Times a Day.  -     B-D UF III MINI PEN NEEDLES 31G X 5 MM misc; Use with insulin twice a day  -     Insulin Lispro Prot & Lispro (HUMALOG MIX 75/25 KWIKPEN) (75-25) 100 UNIT/ML suspension pen-injector pen; Inject 20 Units under the skin into the appropriate area as directed 2 (Two) Times a Day.    Gastroesophageal reflux disease, esophagitis presence not specified  -     pantoprazole (PROTONIX) 40 MG EC tablet; Take 1 tablet by mouth Daily.    Muscle spasm  -     methocarbamol (ROBAXIN) 500 MG tablet; Take 1 tablet by mouth 3 (Three) Times a Day As Needed for Muscle Spasms.    Primary insomnia  -     Discontinue: traZODone (DESYREL) 50 MG tablet; Take 1-2 tabs nightly as needed for insomnia  -     traZODone (DESYREL) 100 MG tablet; Take 1-2 tabs nightly as needed for insomnia    Post-menopausal  -     DEXA Bone Density Axial  -     Calcium Carb-Cholecalciferol (CALCIUM-VITAMIN D) 500-200 MG-UNIT per tablet; Take 1 tablet by mouth 2 (Two) Times a Day With Meals.    Insomnia, unspecified type    Lipid screening  -     Lipid Panel    Coronary artery disease of bypass graft of native heart with stable angina pectoris (CMS/[facility])       Patient doing better with insomnia, acid reflux stable on pantoprazole, unable to wean.  Sees endocrinology and cardiology every 6 months.  Will increase Wellbutrin to see if this helps her lose weight, continue with carbohydrate  reduction.  Will get DEXA scan for osteoporosis screening and start calcium with vit d for prevention       The patient was counseled regarding diagnostic results, impressions, prognosis, instructions for management, risk factor reductions, education, and importance of treatment compliance.  The patient verbalized understanding of and agreement with the plan of care.    Advised patient to call with any further questions and any new or worsening symptoms.     Return in about 6 months (around 2/9/2020).      Mckayla Hinton, ROCCO    Please note that portions of this note were completed with a voice recognition program. Efforts were made to edit the dictations, but occasionally words are mistranscribed.

## 2019-08-10 LAB
BASOPHILS # BLD AUTO: 0.04 10*3/MM3 (ref 0–0.2)
BASOPHILS NFR BLD AUTO: 0.6 % (ref 0–1.5)
DEPRECATED RDW RBC AUTO: 46.3 FL (ref 37–54)
EOSINOPHIL # BLD AUTO: 0.3 10*3/MM3 (ref 0–0.4)
EOSINOPHIL NFR BLD AUTO: 4.5 % (ref 0.3–6.2)
ERYTHROCYTE [DISTWIDTH] IN BLOOD BY AUTOMATED COUNT: 13.3 % (ref 12.3–15.4)
HCT VFR BLD AUTO: 44.7 % (ref 34–46.6)
HGB BLD-MCNC: 13.7 G/DL (ref 12–15.9)
IMM GRANULOCYTES # BLD AUTO: 0.02 10*3/MM3 (ref 0–0.05)
IMM GRANULOCYTES NFR BLD AUTO: 0.3 % (ref 0–0.5)
LYMPHOCYTES # BLD AUTO: 2.02 10*3/MM3 (ref 0.7–3.1)
LYMPHOCYTES NFR BLD AUTO: 30.4 % (ref 19.6–45.3)
MCH RBC QN AUTO: 29 PG (ref 26.6–33)
MCHC RBC AUTO-ENTMCNC: 30.6 G/DL (ref 31.5–35.7)
MCV RBC AUTO: 94.5 FL (ref 79–97)
MONOCYTES # BLD AUTO: 0.58 10*3/MM3 (ref 0.1–0.9)
MONOCYTES NFR BLD AUTO: 8.7 % (ref 5–12)
NEUTROPHILS # BLD AUTO: 3.68 10*3/MM3 (ref 1.7–7)
NEUTROPHILS NFR BLD AUTO: 55.5 % (ref 42.7–76)
NRBC BLD AUTO-RTO: 0 /100 WBC (ref 0–0.2)
PLATELET # BLD AUTO: 290 10*3/MM3 (ref 140–450)
PMV BLD AUTO: 10.1 FL (ref 6–12)
RBC # BLD AUTO: 4.73 10*6/MM3 (ref 3.77–5.28)
WBC NRBC COR # BLD: 6.64 10*3/MM3 (ref 3.4–10.8)

## 2019-10-29 ENCOUNTER — HOSPITAL ENCOUNTER (OUTPATIENT)
Dept: BONE DENSITY | Facility: HOSPITAL | Age: 59
Discharge: HOME | End: 2019-10-29
Admitting: NURSE PRACTITIONER

## 2019-10-29 PROCEDURE — 77080 DXA BONE DENSITY AXIAL: CPT

## 2019-10-30 PROBLEM — M85.852 OSTEOPENIA OF NECK OF LEFT FEMUR: Status: ACTIVE | Noted: 2019-10-30

## 2019-12-31 DIAGNOSIS — M62.838 MUSCLE SPASM: ICD-10-CM

## 2020-01-02 RX ORDER — METHOCARBAMOL 500 MG/1
TABLET, FILM COATED ORAL
Qty: 90 TABLET | Refills: 0 | Status: SHIPPED | OUTPATIENT
Start: 2020-01-02 | End: 2020-01-30 | Stop reason: SDUPTHER

## 2020-01-30 ENCOUNTER — TELEPHONE (OUTPATIENT)
Dept: INTERNAL MEDICINE | Facility: CLINIC | Age: 60
End: 2020-01-30

## 2020-01-30 DIAGNOSIS — F32.0 CURRENT MILD EPISODE OF MAJOR DEPRESSIVE DISORDER WITHOUT PRIOR EPISODE (HCC): ICD-10-CM

## 2020-01-30 DIAGNOSIS — M62.838 MUSCLE SPASM: ICD-10-CM

## 2020-01-30 RX ORDER — METHOCARBAMOL 500 MG/1
500 TABLET, FILM COATED ORAL 3 TIMES DAILY PRN
Qty: 90 TABLET | Refills: 0 | Status: SHIPPED | OUTPATIENT
Start: 2020-01-30 | End: 2020-03-04

## 2020-01-30 RX ORDER — BUPROPION HYDROCHLORIDE 300 MG/1
300 TABLET ORAL DAILY
Qty: 90 TABLET | Refills: 0 | Status: SHIPPED | OUTPATIENT
Start: 2020-01-30 | End: 2020-02-10 | Stop reason: SDUPTHER

## 2020-01-30 NOTE — TELEPHONE ENCOUNTER
Pt called to request a med refill of the listed prescriptions before her upcoming appt. Pt state that she is completely out and would like an order placed to last until 02/10/20    methocarbamol (ROBAXIN) 500 MG tablet    buPROPion XL (WELLBUTRIN XL) 300 MG 24 hr tablet      Pt would like orders sent to the [facility] on [address]       Pt's contact#: [phone]

## 2020-02-10 ENCOUNTER — OFFICE VISIT (OUTPATIENT)
Dept: INTERNAL MEDICINE | Facility: CLINIC | Age: 60
End: 2020-02-10

## 2020-02-10 VITALS
DIASTOLIC BLOOD PRESSURE: 78 MMHG | WEIGHT: 218 LBS | BODY MASS INDEX: 42.8 KG/M2 | OXYGEN SATURATION: 99 % | HEART RATE: 97 BPM | RESPIRATION RATE: 16 BRPM | HEIGHT: 60 IN | TEMPERATURE: 97.2 F | SYSTOLIC BLOOD PRESSURE: 126 MMHG

## 2020-02-10 DIAGNOSIS — E11.69 TYPE 2 DIABETES MELLITUS WITH OTHER SPECIFIED COMPLICATION, WITH LONG-TERM CURRENT USE OF INSULIN (HCC): ICD-10-CM

## 2020-02-10 DIAGNOSIS — I25.708 CORONARY ARTERY DISEASE OF BYPASS GRAFT OF NATIVE HEART WITH STABLE ANGINA PECTORIS (HCC): ICD-10-CM

## 2020-02-10 DIAGNOSIS — Z79.899 MEDICATION MANAGEMENT: ICD-10-CM

## 2020-02-10 DIAGNOSIS — E66.01 CLASS 3 SEVERE OBESITY DUE TO EXCESS CALORIES WITH SERIOUS COMORBIDITY AND BODY MASS INDEX (BMI) OF 40.0 TO 44.9 IN ADULT (HCC): ICD-10-CM

## 2020-02-10 DIAGNOSIS — I73.9 PAD (PERIPHERAL ARTERY DISEASE) (HCC): Primary | ICD-10-CM

## 2020-02-10 DIAGNOSIS — Z79.4 TYPE 2 DIABETES MELLITUS WITH OTHER SPECIFIED COMPLICATION, WITH LONG-TERM CURRENT USE OF INSULIN (HCC): ICD-10-CM

## 2020-02-10 DIAGNOSIS — I10 ESSENTIAL HYPERTENSION: ICD-10-CM

## 2020-02-10 DIAGNOSIS — F32.0 CURRENT MILD EPISODE OF MAJOR DEPRESSIVE DISORDER WITHOUT PRIOR EPISODE (HCC): ICD-10-CM

## 2020-02-10 DIAGNOSIS — Z23 NEED FOR INFLUENZA VACCINATION: ICD-10-CM

## 2020-02-10 PROCEDURE — G0008 ADMIN INFLUENZA VIRUS VAC: HCPCS | Performed by: NURSE PRACTITIONER

## 2020-02-10 PROCEDURE — 90674 CCIIV4 VAC NO PRSV 0.5 ML IM: CPT | Performed by: NURSE PRACTITIONER

## 2020-02-10 PROCEDURE — 99214 OFFICE O/P EST MOD 30 MIN: CPT | Performed by: NURSE PRACTITIONER

## 2020-02-10 RX ORDER — BUPROPION HYDROCHLORIDE 300 MG/1
300 TABLET ORAL DAILY
Qty: 90 TABLET | Refills: 1 | Status: SHIPPED | OUTPATIENT
Start: 2020-02-10 | End: 2020-06-29

## 2020-02-10 RX ORDER — METOPROLOL TARTRATE 50 MG/1
50 TABLET, FILM COATED ORAL 2 TIMES DAILY
COMMUNITY
Start: 2020-02-04

## 2020-02-10 RX ORDER — TRAZODONE HYDROCHLORIDE 50 MG/1
50-100 TABLET ORAL NIGHTLY PRN
COMMUNITY
Start: 2020-01-31 | End: 2020-03-24 | Stop reason: SDUPTHER

## 2020-02-10 RX ORDER — LISINOPRIL 2.5 MG/1
2.5 TABLET ORAL DAILY
Qty: 30 TABLET | Refills: 5 | Status: SHIPPED | OUTPATIENT
Start: 2020-02-10 | End: 2021-09-16 | Stop reason: SDUPTHER

## 2020-02-10 RX ORDER — CHOLECALCIFEROL (VITAMIN D3) 25 MCG
1000 CAPSULE ORAL DAILY
COMMUNITY

## 2020-02-10 NOTE — PROGRESS NOTES
Subjective   Jayna Ambrocio is a 59 y.o. female here today for HTN/ LAQUITA.    Chief Complaint   Patient presents with   • Hypertension   • Diabetes   Jayna is here today to follow-up on hypertension, obesity, reflux.  She has been compliant with her medication.  Her A1c was checked at End[facility] and was at goal of 6.1.  She is recently seen cardiology and is doing well.  She denies any chest pain, shortness of breath, swelling of her lower extremities.  She does show interest in losing weight and is thinking of joining weight watchers.  Bowel habits normal.    Review of Systems   Constitutional: Positive for unexpected weight gain. Negative for activity change, appetite change, chills, fever and unexpected weight loss.   HENT: Negative for congestion, ear pain, nosebleeds, postnasal drip, sore throat, trouble swallowing and voice change.    Eyes: Negative for blurred vision, pain, itching and visual disturbance.   Respiratory: Negative for cough and shortness of breath.    Cardiovascular: Negative for chest pain and palpitations.   Gastrointestinal: Negative for blood in stool, diarrhea, nausea and vomiting.   Endocrine: Negative for polydipsia, polyphagia and polyuria.   Genitourinary: Negative for dysuria, flank pain, frequency, genital sores, hematuria and urgency.   Musculoskeletal: Negative for arthralgias and myalgias.   Skin: Negative for rash and skin lesions.   Allergic/Immunologic: Negative for environmental allergies, food allergies and immunocompromised state.   Neurological: Negative for dizziness, seizures, weakness, headache, memory problem and confusion.   Psychiatric/Behavioral: Positive for stress. Negative for self-injury, sleep disturbance, suicidal ideas and depressed mood. The patient is not nervous/anxious.        Past Medical History:   Diagnosis Date   • Arthritis     bilateral hands   • Coronary artery disease    • Diabetes mellitus (CMS/HCC)    • Heart failure (CMS/HCC)    • Migraines   "    Family History   Problem Relation Age of Onset   • Cancer Mother         Unsure what kind   • Diabetes Mother    • No Known Problems Father    • Diabetes Sister    • Cancer Sister         Unsure what kind   • Breast cancer Sister 63   • Coronary artery disease Brother    • No Known Problems Maternal Aunt    • No Known Problems Maternal Uncle    • No Known Problems Paternal Aunt    • No Known Problems Paternal Uncle    • No Known Problems Maternal Grandmother    • No Known Problems Maternal Grandfather    • No Known Problems Paternal Grandmother    • No Known Problems Paternal Grandfather    • Diabetes Sister    • No Known Problems Daughter    • Diabetes Son    • No Known Problems Son    • Ovarian cancer Neg Hx      Past Surgical History:   Procedure Laterality Date   • CORONARY ARTERY BYPASS GRAFT  2018    4 vessel   • FOOT SURGERY Left 03/15/2012     Social History     Socioeconomic History   • Marital status: Single     Spouse name: Not on file   • Number of children: 3   • Years of education: Not on file   • Highest education level: Not on file   Tobacco Use   • Smoking status: Former Smoker     Types: Cigarettes     Start date:      Last attempt to quit:      Years since quittin.1   • Smokeless tobacco: Never Used   • Tobacco comment: \"1 pack could last 2 weeks\"   Substance and Sexual Activity   • Alcohol use: No   • Drug use: No   • Sexual activity: Not Currently     Partners: Male         Current Outpatient Medications:   •  ACCU-CHEK SOFTCLIX LANCETS lancets, USE TO TEST BLOOD GLUCOSE THREE TIMES DAILY AS DIRECTED, Disp: , Rfl: 5  •  aspirin 81 MG tablet, Take 1 tablet by mouth Daily., Disp: 90 tablet, Rfl: 2  •  B-D UF III MINI PEN NEEDLES 31G X 5 MM misc, Use with insulin twice a day, Disp: 60 each, Rfl: 11  •  B-D ULTRAFINE III SHORT PEN 31G X 8 MM misc, Inject 1 unit marking on U-100 syringe as directed 2 (Two) Times a Day., Disp: 90 each, Rfl: 2  •  Blood Glucose Monitoring Suppl " "(ACCU-CHEK MARIO PLUS) w/Device kit, use to test blood glucose three times daily, Disp: , Rfl: 5  •  buPROPion XL (WELLBUTRIN XL) 300 MG 24 hr tablet, Take 1 tablet by mouth Daily., Disp: 90 tablet, Rfl: 1  •  Cholecalciferol (VITAMIN D-3) 25 MCG (1000 UT) capsule, Take 1,000 Units by mouth Daily., Disp: , Rfl:   •  clopidogrel (PLAVIX) 75 MG tablet, Take 1 tablet by mouth Daily. 200001, Disp: 90 tablet, Rfl: 2  •  FREESTYLE LITE test strip, Use as instructed, Disp: 90 each, Rfl: 5  •  Insulin Lispro Prot & Lispro (HUMALOG MIX 75/25 KWIKPEN) (75-25) 100 UNIT/ML suspension pen-injector pen, Inject 20 Units under the skin into the appropriate area as directed 2 (Two) Times a Day., Disp: 5 pen, Rfl: 3  •  isosorbide mononitrate (IMDUR) 60 MG 24 hr tablet, Take 1 tablet by mouth Every Morning., Disp: 90 tablet, Rfl: 2  •  methocarbamol (ROBAXIN) 500 MG tablet, Take 1 tablet by mouth 3 (Three) Times a Day As Needed for Muscle Spasms., Disp: 90 tablet, Rfl: 0  •  metoprolol tartrate (LOPRESSOR) 50 MG tablet, Take 50 mg by mouth 2 (Two) Times a Day., Disp: , Rfl:   •  pantoprazole (PROTONIX) 40 MG EC tablet, Take 1 tablet by mouth Daily., Disp: 90 tablet, Rfl: 1  •  rosuvastatin (CRESTOR) 40 MG tablet, Take 1 tablet by mouth Daily. 200001, Disp: 30 tablet, Rfl: 5  •  Specialty Vitamins Products (HEALTHY HEART PO), Take 400 unit marking on U-100 syringe by mouth Daily., Disp: , Rfl:   •  traZODone (DESYREL) 50 MG tablet, Take  mg by mouth At Night As Needed., Disp: , Rfl:   •  lisinopril (PRINIVIL,ZESTRIL) 2.5 MG tablet, Take 1 tablet by mouth Daily., Disp: 30 tablet, Rfl: 5    Objective   Vitals:    02/10/20 1027   BP: 126/78   Pulse: 97   Resp: 16   Temp: 97.2 °F (36.2 °C)   TempSrc: Temporal   SpO2: 99%   Weight: 98.9 kg (218 lb)   Height: 152.4 cm (60\")     Body mass index is 42.58 kg/m².  Physical Exam   Constitutional: She is oriented to person, place, and time. She appears well-developed and well-nourished. No " distress.   Eyes: Pupils are equal, round, and reactive to light.   Neck: Neck supple. No thyromegaly present.   Cardiovascular: Normal rate and regular rhythm.   No murmur heard.  Pulmonary/Chest: Effort normal and breath sounds normal. No stridor. No respiratory distress. She has no wheezes.   Abdominal: Soft. Bowel sounds are normal.   Musculoskeletal: She exhibits no edema.    Jayna had a diabetic foot exam performed today.   During the foot exam she had a monofilament test performed.    Neurological Sensory Findings -  No right ankle/foot altered proprioception and no left ankle/foot altered proprioception  Vascular Status -  Her right foot exhibits normal foot vasculature  and no edema. Her left foot exhibits normal foot vasculature  and no edema.  Skin Integrity  -  Her right foot skin is intact.Her left foot skin is intact..  Neurological: She is alert and oriented to person, place, and time.   Skin: Skin is warm and dry. Capillary refill takes 2 to 3 seconds. She is not diaphoretic.   Psychiatric: She has a normal mood and affect. Her behavior is normal. Judgment and thought content normal.   Nursing note and vitals reviewed.      Assessment/Plan   Problem List Items Addressed This Visit        Cardiovascular and Mediastinum    Coronary artery disease    Relevant Medications    clopidogrel (PLAVIX) 75 MG tablet    rosuvastatin (CRESTOR) 40 MG tablet    isosorbide mononitrate (IMDUR) 60 MG 24 hr tablet    aspirin 81 MG tablet    metoprolol tartrate (LOPRESSOR) 50 MG tablet    Other Relevant Orders    CBC & Differential    Comprehensive Metabolic Panel    Lipid Panel    Essential hypertension    Relevant Medications    metoprolol tartrate (LOPRESSOR) 50 MG tablet    lisinopril (PRINIVIL,ZESTRIL) 2.5 MG tablet    PAD (peripheral artery disease) (CMS/[facility]) - Primary    Relevant Medications    rosuvastatin (CRESTOR) 40 MG tablet    Other Relevant Orders    CBC & Differential    Comprehensive Metabolic Panel        Digestive    Class 3 severe obesity due to excess calories with serious comorbidity and body mass index (BMI) of 40.0 to 44.9 in adult (CMS/[facility])    Relevant Orders    CBC & Differential    Comprehensive Metabolic Panel    TSH       Endocrine    Type 2 diabetes mellitus (CMS/[facility])    Relevant Medications    FREESTYLE LITE test strip    Blood Glucose Monitoring Suppl (ACCU-CHEK MARIO PLUS) w/Device kit    ACCU-CHEK SOFTCLIX LANCETS lancets    B-D ULTRAFINE III SHORT PEN 31G X 8 MM misc    B-D UF III MINI PEN NEEDLES 31G X 5 MM misc    Insulin Lispro Prot & Lispro (HUMALOG MIX 75/25 KWIKPEN) (75-25) 100 UNIT/ML suspension pen-injector pen    lisinopril (PRINIVIL,ZESTRIL) 2.5 MG tablet    Other Relevant Orders    CBC & Differential    Comprehensive Metabolic Panel       Other    Current mild episode of major depressive disorder without prior episode (CMS/[facility])    Relevant Medications    traZODone (DESYREL) 50 MG tablet    buPROPion XL (WELLBUTRIN XL) 300 MG 24 hr tablet      Other Visit Diagnoses     Medication management        Relevant Orders    CBC & Differential    Comprehensive Metabolic Panel    TSH    Need for influenza vaccination        Relevant Orders    Flucelvax Quad=>4Years (4990-7322) (Completed)        Jayna was seen today for hypertension and diabetes.    Diagnoses and all orders for this visit:    PAD (peripheral artery disease) (CMS/[facility])  -     CBC & Differential  -     Comprehensive Metabolic Panel  Labs today, stable  Coronary artery disease of bypass graft of native heart with stable angina pectoris (CMS/[facility])  -     CBC & Differential  -     Comprehensive Metabolic Panel  -     Lipid Panel  Will add on low-dose lisinopril  Type 2 diabetes mellitus with other specified complication, with long-term current use of insulin (CMS/[facility])  -     CBC & Differential  -     Comprehensive Metabolic Panel  -     lisinopril (PRINIVIL,ZESTRIL) 2.5 MG tablet; Take 1 tablet by mouth Daily.  We will add on low-dose  lisinopril for renal protection  Class 3 severe obesity due to excess calories with serious comorbidity and body mass index (BMI) of 40.0 to 44.9 in adult (CMS/[facility])  -     CBC & Differential  -     Comprehensive Metabolic Panel  -     TSH  Recommend weight watchers is a healthy and motivating way to lose weight  Current mild episode of major depressive disorder without prior episode (CMS/[facility])  -     buPROPion XL (WELLBUTRIN XL) 300 MG 24 hr tablet; Take 1 tablet by mouth Daily.  -     Stable, continue current treatment plan    Essential hypertension  -     lisinopril (PRINIVIL,ZESTRIL) 2.5 MG tablet; Take 1 tablet by mouth Daily.    Medication management  -     CBC & Differential  -     Comprehensive Metabolic Panel  -     TSH    Need for influenza vaccination  -     Cancel: Fluad Tri 65yr (5932-0741)  -     Flucelvax Quad=>4Years (1134-0196)             The patient was counseled regarding diagnostic results, impressions, prognosis, instructions for management, risk factor reductions, education, and importance of treatment compliance.  The patient verbalized understanding of and agreement with the plan of care.    Advised patient to call with any further questions and any new or worsening symptoms.     Return in about 6 months (around 8/10/2020) for Medicare Wellness with next visit.      Mckayla Hinton, ROCCO    Please note that portions of this note were completed with a voice recognition program. Efforts were made to edit the dictations, but occasionally words are mistranscribed.

## 2020-03-02 DIAGNOSIS — M62.838 MUSCLE SPASM: ICD-10-CM

## 2020-03-04 RX ORDER — METHOCARBAMOL 500 MG/1
500 TABLET, FILM COATED ORAL 3 TIMES DAILY PRN
Qty: 90 TABLET | Refills: 2 | Status: SHIPPED | OUTPATIENT
Start: 2020-03-04 | End: 2020-08-03

## 2020-03-24 RX ORDER — TRAZODONE HYDROCHLORIDE 50 MG/1
TABLET ORAL
Qty: 180 TABLET | Refills: 1 | Status: SHIPPED | OUTPATIENT
Start: 2020-03-24 | End: 2020-08-20

## 2020-06-09 ENCOUNTER — PATIENT OUTREACH (OUTPATIENT)
Dept: INTERNAL MEDICINE | Facility: CLINIC | Age: 60
End: 2020-06-09

## 2020-06-27 DIAGNOSIS — F32.0 CURRENT MILD EPISODE OF MAJOR DEPRESSIVE DISORDER WITHOUT PRIOR EPISODE (HCC): ICD-10-CM

## 2020-06-27 DIAGNOSIS — K21.9 GASTROESOPHAGEAL REFLUX DISEASE, ESOPHAGITIS PRESENCE NOT SPECIFIED: ICD-10-CM

## 2020-06-29 RX ORDER — PANTOPRAZOLE SODIUM 40 MG/1
TABLET, DELAYED RELEASE ORAL
Qty: 90 TABLET | Refills: 0 | Status: SHIPPED | OUTPATIENT
Start: 2020-06-29 | End: 2020-09-10

## 2020-06-29 RX ORDER — BUPROPION HYDROCHLORIDE 300 MG/1
TABLET ORAL
Qty: 90 TABLET | Refills: 0 | Status: SHIPPED | OUTPATIENT
Start: 2020-06-29 | End: 2020-08-20

## 2020-07-17 DIAGNOSIS — E11.69 TYPE 2 DIABETES MELLITUS WITH OTHER SPECIFIED COMPLICATION, WITH LONG-TERM CURRENT USE OF INSULIN (HCC): ICD-10-CM

## 2020-07-17 DIAGNOSIS — Z79.4 TYPE 2 DIABETES MELLITUS WITH OTHER SPECIFIED COMPLICATION, WITH LONG-TERM CURRENT USE OF INSULIN (HCC): ICD-10-CM

## 2020-07-29 DIAGNOSIS — I25.810 CORONARY ARTERY DISEASE INVOLVING CORONARY BYPASS GRAFT WITHOUT ANGINA PECTORIS, UNSPECIFIED WHETHER NATIVE OR TRANSPLANTED HEART: ICD-10-CM

## 2020-07-29 RX ORDER — CLOPIDOGREL BISULFATE 75 MG/1
75 TABLET ORAL DAILY
Qty: 30 TABLET | Refills: 0 | Status: SHIPPED | OUTPATIENT
Start: 2020-07-29 | End: 2020-09-02

## 2020-08-03 DIAGNOSIS — M62.838 MUSCLE SPASM: ICD-10-CM

## 2020-08-03 RX ORDER — METHOCARBAMOL 500 MG/1
500 TABLET, FILM COATED ORAL 3 TIMES DAILY PRN
Qty: 90 TABLET | Refills: 2 | Status: SHIPPED | OUTPATIENT
Start: 2020-08-03 | End: 2020-11-24

## 2020-08-20 DIAGNOSIS — F32.0 CURRENT MILD EPISODE OF MAJOR DEPRESSIVE DISORDER WITHOUT PRIOR EPISODE (HCC): ICD-10-CM

## 2020-08-20 RX ORDER — BUPROPION HYDROCHLORIDE 300 MG/1
TABLET ORAL
Qty: 30 TABLET | Refills: 0 | Status: SHIPPED | OUTPATIENT
Start: 2020-08-20 | End: 2020-10-01 | Stop reason: SDUPTHER

## 2020-08-20 RX ORDER — TRAZODONE HYDROCHLORIDE 50 MG/1
TABLET ORAL
Qty: 60 TABLET | Refills: 0 | Status: SHIPPED | OUTPATIENT
Start: 2020-08-20 | End: 2020-09-10

## 2020-08-20 NOTE — TELEPHONE ENCOUNTER
DELETE AFTER REVIEWING: Telephone encounter to be sent to the clinical pool.  If patient has less than a 3 day supply left, send the encounter HIGH Priority.    Caller: Jayna Ambrocio    Relationship: Self    Best call back number: [phone]    Medication needed:   Requested Prescriptions     Pending Prescriptions Disp Refills   • traZODone (DESYREL) 50 MG tablet [Pharmacy Med Name: TRAZODONE 50 MG TABLET] 180 tablet 1     Sig: TAKE 1 TO 2 TABLETS BY MOUTH AT BEDTIME AS NEEDED   • buPROPion XL (WELLBUTRIN XL) 300 MG 24 hr tablet [Pharmacy Med Name: BUPROPION HCL  MG TABLET] 90 tablet 0     Sig: TAKE 1 TABLET BY MOUTH EVERY DAY       When do you need the refill by: 08/20/2020    What details did the patient provide when requesting the medication: Patient is completely out of her Trazodone but has roughly 6 tablets left of her Wellbutrin.     Does the patient have less than a 3 day supply:  [x] Yes  [] No    What is the patient's preferred pharmacy: [facility]/PHARMACY #[id number] - [place], [place] - [address] [phone] [facility] [phone]

## 2020-09-02 DIAGNOSIS — I25.810 CORONARY ARTERY DISEASE INVOLVING CORONARY BYPASS GRAFT WITHOUT ANGINA PECTORIS, UNSPECIFIED WHETHER NATIVE OR TRANSPLANTED HEART: ICD-10-CM

## 2020-09-02 RX ORDER — CLOPIDOGREL BISULFATE 75 MG/1
75 TABLET ORAL DAILY
Qty: 30 TABLET | Refills: 0 | Status: SHIPPED | OUTPATIENT
Start: 2020-09-02 | End: 2020-10-05

## 2020-09-09 DIAGNOSIS — K21.9 GASTROESOPHAGEAL REFLUX DISEASE, ESOPHAGITIS PRESENCE NOT SPECIFIED: ICD-10-CM

## 2020-09-10 RX ORDER — PANTOPRAZOLE SODIUM 40 MG/1
TABLET, DELAYED RELEASE ORAL
Qty: 90 TABLET | Refills: 0 | Status: SHIPPED | OUTPATIENT
Start: 2020-09-10

## 2020-09-10 RX ORDER — TRAZODONE HYDROCHLORIDE 50 MG/1
TABLET ORAL
Qty: 54 TABLET | Refills: 1 | Status: SHIPPED | OUTPATIENT
Start: 2020-09-10 | End: 2020-10-26

## 2020-10-01 DIAGNOSIS — F32.0 CURRENT MILD EPISODE OF MAJOR DEPRESSIVE DISORDER WITHOUT PRIOR EPISODE (HCC): ICD-10-CM

## 2020-10-01 RX ORDER — BUPROPION HYDROCHLORIDE 300 MG/1
TABLET ORAL
Qty: 14 TABLET | Refills: 0 | Status: SHIPPED | OUTPATIENT
Start: 2020-10-01 | End: 2020-10-21

## 2020-10-04 DIAGNOSIS — I25.810 CORONARY ARTERY DISEASE INVOLVING CORONARY BYPASS GRAFT WITHOUT ANGINA PECTORIS, UNSPECIFIED WHETHER NATIVE OR TRANSPLANTED HEART: ICD-10-CM

## 2020-10-05 RX ORDER — CLOPIDOGREL BISULFATE 75 MG/1
75 TABLET ORAL DAILY
Qty: 30 TABLET | Refills: 0 | Status: SHIPPED | OUTPATIENT
Start: 2020-10-05 | End: 2020-11-02

## 2020-10-21 DIAGNOSIS — F32.0 CURRENT MILD EPISODE OF MAJOR DEPRESSIVE DISORDER WITHOUT PRIOR EPISODE (HCC): ICD-10-CM

## 2020-10-21 RX ORDER — BUPROPION HYDROCHLORIDE 300 MG/1
TABLET ORAL
Qty: 30 TABLET | Refills: 2 | Status: SHIPPED | OUTPATIENT
Start: 2020-10-21 | End: 2020-11-19 | Stop reason: SDUPTHER

## 2020-10-26 RX ORDER — TRAZODONE HYDROCHLORIDE 50 MG/1
TABLET ORAL
Qty: 54 TABLET | Refills: 1 | Status: SHIPPED | OUTPATIENT
Start: 2020-10-26 | End: 2021-01-04 | Stop reason: SDUPTHER

## 2020-10-28 DIAGNOSIS — I25.810 CORONARY ARTERY DISEASE INVOLVING CORONARY BYPASS GRAFT WITHOUT ANGINA PECTORIS, UNSPECIFIED WHETHER NATIVE OR TRANSPLANTED HEART: ICD-10-CM

## 2020-10-28 RX ORDER — CLOPIDOGREL BISULFATE 75 MG/1
75 TABLET ORAL DAILY
Qty: 30 TABLET | Refills: 0 | OUTPATIENT
Start: 2020-10-28

## 2020-11-01 DIAGNOSIS — Z79.4 TYPE 2 DIABETES MELLITUS WITH OTHER SPECIFIED COMPLICATION, WITH LONG-TERM CURRENT USE OF INSULIN (HCC): ICD-10-CM

## 2020-11-01 DIAGNOSIS — I25.810 CORONARY ARTERY DISEASE INVOLVING CORONARY BYPASS GRAFT WITHOUT ANGINA PECTORIS, UNSPECIFIED WHETHER NATIVE OR TRANSPLANTED HEART: ICD-10-CM

## 2020-11-01 DIAGNOSIS — E11.69 TYPE 2 DIABETES MELLITUS WITH OTHER SPECIFIED COMPLICATION, WITH LONG-TERM CURRENT USE OF INSULIN (HCC): ICD-10-CM

## 2020-11-02 RX ORDER — BLOOD SUGAR DIAGNOSTIC
STRIP MISCELLANEOUS
Qty: 100 EACH | Refills: 1 | Status: SHIPPED | OUTPATIENT
Start: 2020-11-02 | End: 2021-09-16 | Stop reason: SDUPTHER

## 2020-11-02 RX ORDER — CLOPIDOGREL BISULFATE 75 MG/1
75 TABLET ORAL DAILY
Qty: 30 TABLET | Refills: 0 | Status: SHIPPED | OUTPATIENT
Start: 2020-11-02 | End: 2020-11-24

## 2020-11-19 DIAGNOSIS — F32.0 CURRENT MILD EPISODE OF MAJOR DEPRESSIVE DISORDER WITHOUT PRIOR EPISODE (HCC): ICD-10-CM

## 2020-11-19 RX ORDER — BUPROPION HYDROCHLORIDE 300 MG/1
300 TABLET ORAL DAILY
Qty: 90 TABLET | Refills: 0 | Status: SHIPPED | OUTPATIENT
Start: 2020-11-19 | End: 2020-11-24

## 2020-11-24 DIAGNOSIS — F32.0 CURRENT MILD EPISODE OF MAJOR DEPRESSIVE DISORDER WITHOUT PRIOR EPISODE (HCC): ICD-10-CM

## 2020-11-24 DIAGNOSIS — M62.838 MUSCLE SPASM: ICD-10-CM

## 2020-11-24 DIAGNOSIS — I25.810 CORONARY ARTERY DISEASE INVOLVING CORONARY BYPASS GRAFT WITHOUT ANGINA PECTORIS, UNSPECIFIED WHETHER NATIVE OR TRANSPLANTED HEART: ICD-10-CM

## 2020-11-24 RX ORDER — CLOPIDOGREL BISULFATE 75 MG/1
75 TABLET ORAL DAILY
Qty: 30 TABLET | Refills: 0 | Status: SHIPPED | OUTPATIENT
Start: 2020-11-24 | End: 2020-12-22 | Stop reason: SDUPTHER

## 2020-11-24 RX ORDER — METHOCARBAMOL 500 MG/1
TABLET, FILM COATED ORAL
Qty: 90 TABLET | Refills: 2 | Status: SHIPPED | OUTPATIENT
Start: 2020-11-24 | End: 2021-03-15

## 2020-11-24 RX ORDER — BUPROPION HYDROCHLORIDE 300 MG/1
TABLET ORAL
Qty: 30 TABLET | Refills: 2 | Status: SHIPPED | OUTPATIENT
Start: 2020-11-24 | End: 2020-12-22 | Stop reason: SDUPTHER

## 2020-12-15 RX ORDER — TRAZODONE HYDROCHLORIDE 50 MG/1
TABLET ORAL
Qty: 54 TABLET | Refills: 1 | OUTPATIENT
Start: 2020-12-15

## 2020-12-17 DIAGNOSIS — F32.0 CURRENT MILD EPISODE OF MAJOR DEPRESSIVE DISORDER WITHOUT PRIOR EPISODE (HCC): ICD-10-CM

## 2020-12-17 DIAGNOSIS — I25.810 CORONARY ARTERY DISEASE INVOLVING CORONARY BYPASS GRAFT WITHOUT ANGINA PECTORIS, UNSPECIFIED WHETHER NATIVE OR TRANSPLANTED HEART: ICD-10-CM

## 2020-12-17 RX ORDER — CLOPIDOGREL BISULFATE 75 MG/1
75 TABLET ORAL DAILY
Qty: 30 TABLET | Refills: 0 | OUTPATIENT
Start: 2020-12-17

## 2020-12-17 RX ORDER — BUPROPION HYDROCHLORIDE 300 MG/1
300 TABLET ORAL DAILY
Qty: 30 TABLET | Refills: 0 | OUTPATIENT
Start: 2020-12-17

## 2020-12-17 RX ORDER — TRAZODONE HYDROCHLORIDE 50 MG/1
TABLET ORAL
Qty: 30 TABLET | Refills: 0 | OUTPATIENT
Start: 2020-12-17

## 2020-12-18 DIAGNOSIS — K21.9 GASTROESOPHAGEAL REFLUX DISEASE: ICD-10-CM

## 2020-12-18 RX ORDER — PANTOPRAZOLE SODIUM 40 MG/1
TABLET, DELAYED RELEASE ORAL
Qty: 90 TABLET | Refills: 0 | OUTPATIENT
Start: 2020-12-18

## 2020-12-22 DIAGNOSIS — F32.0 CURRENT MILD EPISODE OF MAJOR DEPRESSIVE DISORDER WITHOUT PRIOR EPISODE (HCC): ICD-10-CM

## 2020-12-22 DIAGNOSIS — I25.810 CORONARY ARTERY DISEASE INVOLVING CORONARY BYPASS GRAFT WITHOUT ANGINA PECTORIS, UNSPECIFIED WHETHER NATIVE OR TRANSPLANTED HEART: ICD-10-CM

## 2020-12-22 RX ORDER — BUPROPION HYDROCHLORIDE 300 MG/1
300 TABLET ORAL DAILY
Qty: 30 TABLET | Refills: 1 | Status: SHIPPED | OUTPATIENT
Start: 2020-12-22 | End: 2021-02-17 | Stop reason: SDUPTHER

## 2020-12-22 RX ORDER — CLOPIDOGREL BISULFATE 75 MG/1
75 TABLET ORAL DAILY
Qty: 30 TABLET | Refills: 1 | Status: SHIPPED | OUTPATIENT
Start: 2020-12-22 | End: 2021-02-15

## 2020-12-30 RX ORDER — TRAZODONE HYDROCHLORIDE 50 MG/1
TABLET ORAL
Qty: 54 TABLET | Refills: 1 | OUTPATIENT
Start: 2020-12-30

## 2020-12-30 NOTE — TELEPHONE ENCOUNTER
Caller: Jayna Ambrocio    Relationship: Self    Best call back number: [phone]    Medication needed:   Requested Prescriptions     Pending Prescriptions Disp Refills   • traZODone (DESYREL) 50 MG tablet 54 tablet 1     Sig: TAKE 1 TO 2 TABLETS BY MOUTH AT BEDTIME AS NEEDED       When do you need the refill by: TODAY    What details did the patient provide when requesting the medication: PATIENT IS COMPLETELY OUT OF MEDICATION AND HER APPOINTMENT ISN'T UNTIL February.    Does the patient have less than a 3 day supply:  [x] Yes  [] No    What is the patient's preferred pharmacy: [facility]/PHARMACY #[id number] - [place], [place] - [address] [phone] [facility] [phone]

## 2021-01-04 ENCOUNTER — TELEPHONE (OUTPATIENT)
Dept: INTERNAL MEDICINE | Facility: CLINIC | Age: 61
End: 2021-01-04

## 2021-01-04 RX ORDER — TRAZODONE HYDROCHLORIDE 50 MG/1
TABLET ORAL
Qty: 54 TABLET | Refills: 0 | Status: CANCELLED | OUTPATIENT
Start: 2021-01-04

## 2021-01-04 RX ORDER — TRAZODONE HYDROCHLORIDE 50 MG/1
TABLET ORAL
Qty: 54 TABLET | Refills: 0 | Status: SHIPPED | OUTPATIENT
Start: 2021-01-04 | End: 2021-01-08 | Stop reason: SDUPTHER

## 2021-01-04 NOTE — TELEPHONE ENCOUNTER
PATIENT IS SCHEDULED TO SEE [facility] ON FEB 5, 2021 AND WANTED TO KNOW IF [facility] WILL REFILL HER TRAZODONE UNTIL HER APPT. PLEASE GIVE PT A CALL AND LET HER KNOW THAT IT WILL CALL IN. PATIENT WOULD LIKE FOR HER REFILL TO BE SENT INTO [facility]. THANK YOU

## 2021-01-08 ENCOUNTER — TELEPHONE (OUTPATIENT)
Dept: INTERNAL MEDICINE | Facility: CLINIC | Age: 61
End: 2021-01-08

## 2021-01-08 RX ORDER — TRAZODONE HYDROCHLORIDE 50 MG/1
TABLET ORAL
Qty: 54 TABLET | Refills: 0 | Status: SHIPPED | OUTPATIENT
Start: 2021-01-08 | End: 2021-02-05

## 2021-01-08 RX ORDER — TRAZODONE HYDROCHLORIDE 50 MG/1
TABLET ORAL
Qty: 54 TABLET | Refills: 0 | Status: CANCELLED | OUTPATIENT
Start: 2021-01-08

## 2021-01-08 NOTE — TELEPHONE ENCOUNTER
PT CALLED STATED THAT [facility] WAS SUPPOSE TO SEND OVER REFILL FOR RX  traZODone (DESYREL) 50 MG tablet AND HE IS AT THE PHARMACY AD THEY DO NOT HAVE IT.    PLEASE ADVISE.  CALL BACK:[phone]      [facility]/pharmacy #[id number] - [place], [place] - [address]

## 2021-01-08 NOTE — TELEPHONE ENCOUNTER
Called the patient's pharmacy and sent in a verbal for the prescribed medication that they didn't receive. LVM informing the patient

## 2021-01-09 DIAGNOSIS — K21.9 GASTROESOPHAGEAL REFLUX DISEASE: ICD-10-CM

## 2021-02-05 ENCOUNTER — LAB (OUTPATIENT)
Dept: LAB | Facility: HOSPITAL | Age: 61
End: 2021-02-05

## 2021-02-05 ENCOUNTER — OFFICE VISIT (OUTPATIENT)
Dept: INTERNAL MEDICINE | Facility: CLINIC | Age: 61
End: 2021-02-05

## 2021-02-05 VITALS
RESPIRATION RATE: 16 BRPM | BODY MASS INDEX: 44.57 KG/M2 | OXYGEN SATURATION: 94 % | SYSTOLIC BLOOD PRESSURE: 136 MMHG | HEART RATE: 79 BPM | WEIGHT: 227 LBS | DIASTOLIC BLOOD PRESSURE: 82 MMHG | HEIGHT: 60 IN | TEMPERATURE: 97.1 F

## 2021-02-05 DIAGNOSIS — E55.9 VITAMIN D DEFICIENCY: ICD-10-CM

## 2021-02-05 DIAGNOSIS — Z00.00 MEDICARE ANNUAL WELLNESS VISIT, SUBSEQUENT: Primary | ICD-10-CM

## 2021-02-05 DIAGNOSIS — E11.69 TYPE 2 DIABETES MELLITUS WITH OTHER SPECIFIED COMPLICATION, WITH LONG-TERM CURRENT USE OF INSULIN (HCC): ICD-10-CM

## 2021-02-05 DIAGNOSIS — Z79.4 TYPE 2 DIABETES MELLITUS WITH OTHER SPECIFIED COMPLICATION, WITH LONG-TERM CURRENT USE OF INSULIN (HCC): ICD-10-CM

## 2021-02-05 DIAGNOSIS — I10 ESSENTIAL HYPERTENSION: ICD-10-CM

## 2021-02-05 DIAGNOSIS — Z13.21 ENCOUNTER FOR VITAMIN DEFICIENCY SCREENING: ICD-10-CM

## 2021-02-05 DIAGNOSIS — Z13.29 THYROID DISORDER SCREENING: ICD-10-CM

## 2021-02-05 LAB
BASOPHILS # BLD AUTO: 0.05 10*3/MM3 (ref 0–0.2)
BASOPHILS NFR BLD AUTO: 0.8 % (ref 0–1.5)
DEPRECATED RDW RBC AUTO: 42.2 FL (ref 37–54)
EOSINOPHIL # BLD AUTO: 0.24 10*3/MM3 (ref 0–0.4)
EOSINOPHIL NFR BLD AUTO: 3.7 % (ref 0.3–6.2)
ERYTHROCYTE [DISTWIDTH] IN BLOOD BY AUTOMATED COUNT: 13.1 % (ref 12.3–15.4)
HBA1C MFR BLD: 8.33 % (ref 4.8–5.6)
HCT VFR BLD AUTO: 42.8 % (ref 34–46.6)
HGB BLD-MCNC: 14.1 G/DL (ref 12–15.9)
IMM GRANULOCYTES # BLD AUTO: 0.02 10*3/MM3 (ref 0–0.05)
IMM GRANULOCYTES NFR BLD AUTO: 0.3 % (ref 0–0.5)
LYMPHOCYTES # BLD AUTO: 1.51 10*3/MM3 (ref 0.7–3.1)
LYMPHOCYTES NFR BLD AUTO: 23.4 % (ref 19.6–45.3)
MCH RBC QN AUTO: 29.3 PG (ref 26.6–33)
MCHC RBC AUTO-ENTMCNC: 32.9 G/DL (ref 31.5–35.7)
MCV RBC AUTO: 89 FL (ref 79–97)
MONOCYTES # BLD AUTO: 0.55 10*3/MM3 (ref 0.1–0.9)
MONOCYTES NFR BLD AUTO: 8.5 % (ref 5–12)
NEUTROPHILS NFR BLD AUTO: 4.09 10*3/MM3 (ref 1.7–7)
NEUTROPHILS NFR BLD AUTO: 63.3 % (ref 42.7–76)
NRBC BLD AUTO-RTO: 0 /100 WBC (ref 0–0.2)
PLATELET # BLD AUTO: 293 10*3/MM3 (ref 140–450)
PMV BLD AUTO: 10 FL (ref 6–12)
RBC # BLD AUTO: 4.81 10*6/MM3 (ref 3.77–5.28)
WBC # BLD AUTO: 6.46 10*3/MM3 (ref 3.4–10.8)

## 2021-02-05 PROCEDURE — 80061 LIPID PANEL: CPT | Performed by: NURSE PRACTITIONER

## 2021-02-05 PROCEDURE — 99396 PREV VISIT EST AGE 40-64: CPT | Performed by: NURSE PRACTITIONER

## 2021-02-05 PROCEDURE — 84443 ASSAY THYROID STIM HORMONE: CPT | Performed by: NURSE PRACTITIONER

## 2021-02-05 PROCEDURE — 1170F FXNL STATUS ASSESSED: CPT | Performed by: NURSE PRACTITIONER

## 2021-02-05 PROCEDURE — 82746 ASSAY OF FOLIC ACID SERUM: CPT | Performed by: NURSE PRACTITIONER

## 2021-02-05 PROCEDURE — 82607 VITAMIN B-12: CPT | Performed by: NURSE PRACTITIONER

## 2021-02-05 PROCEDURE — 1160F RVW MEDS BY RX/DR IN RCRD: CPT | Performed by: NURSE PRACTITIONER

## 2021-02-05 PROCEDURE — 85025 COMPLETE CBC W/AUTO DIFF WBC: CPT | Performed by: NURSE PRACTITIONER

## 2021-02-05 PROCEDURE — G0439 PPPS, SUBSEQ VISIT: HCPCS | Performed by: NURSE PRACTITIONER

## 2021-02-05 PROCEDURE — 82043 UR ALBUMIN QUANTITATIVE: CPT | Performed by: NURSE PRACTITIONER

## 2021-02-05 PROCEDURE — 83036 HEMOGLOBIN GLYCOSYLATED A1C: CPT | Performed by: NURSE PRACTITIONER

## 2021-02-05 PROCEDURE — 82306 VITAMIN D 25 HYDROXY: CPT | Performed by: NURSE PRACTITIONER

## 2021-02-05 PROCEDURE — 80053 COMPREHEN METABOLIC PANEL: CPT | Performed by: NURSE PRACTITIONER

## 2021-02-05 RX ORDER — DULAGLUTIDE 0.75 MG/.5ML
0.75 INJECTION, SOLUTION SUBCUTANEOUS
COMMUNITY
Start: 2021-01-22 | End: 2021-09-15

## 2021-02-05 RX ORDER — TRAZODONE HYDROCHLORIDE 150 MG/1
150 TABLET ORAL NIGHTLY
Qty: 30 TABLET | Refills: 1 | Status: SHIPPED | OUTPATIENT
Start: 2021-02-05 | End: 2021-03-08 | Stop reason: SDUPTHER

## 2021-02-05 NOTE — PROGRESS NOTES
"The ABCs of the Annual Wellness Visit  Subsequent Medicare Wellness Visit    Chief Complaint   Patient presents with   • Medicare Wellness-subsequent       Subjective   History of Present Illness:  Jayna Ambrocio is a 60 y.o. female who presents for a Subsequent Medicare Wellness Visit.  Blood pressure is stable and doing well.  No headaches, changes in vision, shortness of breath, or chest pain.  Sees cardiology and has follow-up next week.  Blood sugars have been running around 180.  She is seeing endocrinology.    Overall healthy: Yes  Regular exercise:  Yes  Diet is well balanced:  Yes  Vitamin Supplement:  Yes  Alcohol intake:  No   Tobacco use:  No    Cardiovascular risk is low:  Will determine after labs result   Menstrual cycle regular:  N/A  PAP:  Up to date  Concern for STDs:  No  Mammogram:  scheduled  Last colon screening:  up to date  Regular dental exam:  Yes   Regular eye exam:  Yes  Immunizations up to date:  Yes  Wear a seatbelt regularly:  Yes  Wear sunscreen regularly when outdoors:  Yes        HEALTH RISK ASSESSMENT    Recent Hospitalizations:  No hospitalization(s) within the last year.    Current Medical Providers:  Patient Care Team:  Sidra Cobos APRN as PCP - General (Nurse Practitioner)    Smoking Status:  Social History     Tobacco Use   Smoking Status Former Smoker   • Types: Cigarettes   • Start date:    • Quit date:    • Years since quittin.1   Smokeless Tobacco Never Used   Tobacco Comment    \"1 pack could last 2 weeks\"       Alcohol Consumption:  Social History     Substance and Sexual Activity   Alcohol Use No       Depression Screen:   PHQ-2/PHQ-9 Depression Screening 2021   Little interest or pleasure in doing things 0   Feeling down, depressed, or hopeless 0   Trouble falling or staying asleep, or sleeping too much -   Feeling tired or having little energy -   Poor appetite or overeating -   Feeling bad about yourself - or that you are a failure or " have let yourself or your family down -   Trouble concentrating on things, such as reading the newspaper or watching television -   Moving or speaking so slowly that other people could have noticed. Or the opposite - being so fidgety or restless that you have been moving around a lot more than usual -   Thoughts that you would be better off dead, or of hurting yourself in some way -   Total Score 0   If you checked off any problems, how difficult have these problems made it for you to do your work, take care of things at home, or get along with other people? -       Fall Risk Screen:  NATALY Fall Risk Assessment has not been completed.    Health Habits and Functional and Cognitive Screening:  Functional & Cognitive Status 2/5/2021   Do you have difficulty preparing food and eating? No   Do you have difficulty bathing yourself, getting dressed or grooming yourself? No   Do you have difficulty using the toilet? No   Do you have difficulty moving around from place to place? No   Do you have trouble with steps or getting out of a bed or a chair? Yes   Current Diet Low Carb Diet   Dental Exam Not up to date   Eye Exam Up to date   Exercise (times per week) 1 times per week   Current Exercise Activities Include Walking   Do you need help using the phone?  No   Are you deaf or do you have serious difficulty hearing?  No   Do you need help with transportation? Yes   Do you need help shopping? No   Do you need help preparing meals?  No   Do you need help with housework?  No   Do you need help with laundry? No   Do you need help taking your medications? No   Do you need help managing money? No   Do you ever drive or ride in a car without wearing a seat belt? No   Have you felt unusual stress, anger or loneliness in the last month? No   Who do you live with? Child   If you need help, do you have trouble finding someone available to you? No   Have you been bothered in the last four weeks by sexual problems? No   Do you have  difficulty concentrating, remembering or making decisions? Yes         Does the patient have evidence of cognitive impairment? No    Asprin use counseling:Taking ASA appropriately as indicated    Age-appropriate Screening Schedule:  Refer to the list below for future screening recommendations based on patient's age, sex and/or medical conditions. Orders for these recommended tests are listed in the plan section. The patient has been provided with a written plan.    Health Maintenance   Topic Date Due   • ZOSTER VACCINE (1 of 2) 09/23/2009   • DIABETIC FOOT EXAM  02/10/2021   • MAMMOGRAM  02/19/2021   • DIABETIC EYE EXAM  03/09/2021   • HEMOGLOBIN A1C  08/05/2021   • LIPID PANEL  02/05/2022   • URINE MICROALBUMIN  02/05/2022   • PAP SMEAR  02/12/2022   • COLONOSCOPY  10/22/2028   • INFLUENZA VACCINE  Completed   • TDAP/TD VACCINES  Discontinued          The following portions of the patient's history were reviewed and updated as appropriate: allergies, current medications, past family history, past medical history, past social history, past surgical history and problem list.    Outpatient Medications Prior to Visit   Medication Sig Dispense Refill   • ACCU-CHEK SOFTCLIX LANCETS lancets USE TO TEST BLOOD GLUCOSE THREE TIMES DAILY AS DIRECTED  5   • aspirin 81 MG tablet Take 1 tablet by mouth Daily. 90 tablet 2   • B-D UF III MINI PEN NEEDLES 31G X 5 MM misc Use with insulin twice a day 60 each 11   • B-D ULTRAFINE III SHORT PEN 31G X 8 MM misc Inject 1 unit marking on U-100 syringe as directed 2 (Two) Times a Day. 90 each 2   • Blood Glucose Monitoring Suppl (ACCU-CHEK MARIO PLUS) w/Device kit use to test blood glucose three times daily  5   • Cholecalciferol (VITAMIN D-3) 25 MCG (1000 UT) capsule Take 1,000 Units by mouth Daily.     • glucose blood (OneTouch Verio) test strip TEST THREE TIMES A DAY E11.9 100 each 1   • Insulin Lispro Prot & Lispro (HUMALOG MIX 75/25 KWIKPEN) (75-25) 100 UNIT/ML suspension  pen-injector pen Inject 20 Units under the skin into the appropriate area as directed 2 (Two) Times a Day. 5 pen 3   • isosorbide mononitrate (IMDUR) 60 MG 24 hr tablet Take 1 tablet by mouth Every Morning. 90 tablet 2   • lisinopril (PRINIVIL,ZESTRIL) 2.5 MG tablet Take 1 tablet by mouth Daily. 30 tablet 5   • methocarbamol (ROBAXIN) 500 MG tablet TAKE 1 TABLET BY MOUTH 3 TIMES A DAY AS NEEDED FOR MUSCLE SPASMS. 90 tablet 2   • metoprolol tartrate (LOPRESSOR) 50 MG tablet Take 50 mg by mouth 2 (Two) Times a Day.     • pantoprazole (PROTONIX) 40 MG EC tablet TAKE 1 TABLET BY MOUTH EVERY DAY 90 tablet 0   • rosuvastatin (CRESTOR) 40 MG tablet Take 1 tablet by mouth Daily. 200001 30 tablet 5   • Specialty Vitamins Products (HEALTHY HEART PO) Take 400 unit marking on U-100 syringe by mouth Daily.     • Trulicity 0.75 MG/0.5ML solution pen-injector Inject 0.75 Units under the skin into the appropriate area as directed Every 7 (Seven) Days.     • buPROPion XL (WELLBUTRIN XL) 300 MG 24 hr tablet Take 1 tablet by mouth Daily. NEEDS APPT FOR FURTHER REFILLS. 30 tablet 1   • clopidogrel (PLAVIX) 75 MG tablet Take 1 tablet by mouth Daily. Patient must keep appointment for further refills. 30 tablet 1   • traZODone (DESYREL) 50 MG tablet TAKE 1 TO 2 TABLETS BY MOUTH AT BEDTIME AS NEEDED 54 tablet 0     No facility-administered medications prior to visit.        Patient Active Problem List   Diagnosis   • Type 2 diabetes mellitus (CMS/[facility])   • Coronary artery disease   • Essential hypertension   • Acute renal insufficiency   • Postoperative anemia due to acute blood loss   • Insomnia   • Gastroesophageal reflux disease   • Moderate nonproliferative diabetic retinopathy without macular edema associated with type 2 diabetes mellitus (CMS/[facility])   • Class 3 severe obesity due to excess calories with serious comorbidity and body mass index (BMI) of 40.0 to 44.9 in adult (CMS/[facility])   • S/P CABG (coronary artery bypass graft)   •  Coronary artery disease of bypass graft of native heart with stable angina pectoris (CMS/[facility])   • PAD (peripheral artery disease) (CMS/[facility])   • History of atrial fibrillation   • Osteopenia of neck of left femur   • Current mild episode of major depressive disorder without prior episode (CMS/[facility])       Advanced Care Planning:  ACP discussion was held with the patient during this visit. Patient has an advance directive in EMR which is still valid.     Review of Systems   Constitutional: Negative for activity change, appetite change, chills, fever, unexpected weight gain and unexpected weight loss.   HENT: Negative for congestion, ear pain, nosebleeds, postnasal drip, sore throat, trouble swallowing and voice change.    Eyes: Negative for blurred vision, pain, itching and visual disturbance.   Respiratory: Negative for cough and shortness of breath.    Cardiovascular: Negative for chest pain and palpitations.   Gastrointestinal: Negative for blood in stool, diarrhea, nausea and vomiting.   Endocrine: Negative for polydipsia, polyphagia and polyuria.   Genitourinary: Negative for dysuria, flank pain, frequency, genital sores, hematuria and urgency.   Musculoskeletal: Negative for arthralgias and myalgias.   Skin: Negative for rash and skin lesions.   Allergic/Immunologic: Negative for environmental allergies, food allergies and immunocompromised state.   Neurological: Negative for dizziness, seizures, weakness, headache, memory problem and confusion.   Psychiatric/Behavioral: Positive for stress. Negative for self-injury, sleep disturbance, suicidal ideas and depressed mood. The patient is not nervous/anxious.        Compared to one year ago, the patient feels her physical health is the same.  Compared to one year ago, the patient feels her mental health is the same.    Reviewed chart for potential of high risk medication in the elderly: yes  Reviewed chart for potential of harmful drug interactions in the  "elderly:yes    Objective         Vitals:    02/05/21 1004   BP: 136/82   Pulse: 79   Resp: 16   Temp: 97.1 °F (36.2 °C)   TempSrc: Temporal   SpO2: 94%   Weight: 103 kg (227 lb)   Height: 152.4 cm (60\")   PainSc:   3   PainLoc: Leg  Comment: bilateral       Body mass index is 44.33 kg/m².  Discussed the patient's BMI with her. The BMI is above average; BMI management plan is completed.    Physical Exam  Vitals signs reviewed.   Constitutional:       Appearance: Normal appearance. She is well-developed.   HENT:      Head: Normocephalic and atraumatic.      Right Ear: Hearing, tympanic membrane, ear canal and external ear normal.      Left Ear: Hearing, tympanic membrane, ear canal and external ear normal.      Nose: Nose normal.      Mouth/Throat:      Lips: Pink.      Mouth: Mucous membranes are moist.      Pharynx: Oropharynx is clear. Uvula midline.   Eyes:      General: Lids are normal.      Extraocular Movements: Extraocular movements intact.      Conjunctiva/sclera: Conjunctivae normal.      Pupils: Pupils are equal, round, and reactive to light.   Neck:      Thyroid: No thyromegaly.      Trachea: Trachea normal.   Cardiovascular:      Rate and Rhythm: Normal rate and regular rhythm.      Pulses:           Carotid pulses are 2+ on the right side and 2+ on the left side.     Heart sounds: Normal heart sounds.   Pulmonary:      Effort: Pulmonary effort is normal. No respiratory distress.      Breath sounds: Normal breath sounds.   Abdominal:      General: Bowel sounds are normal.      Palpations: Abdomen is soft.      Tenderness: There is no abdominal tenderness.   Skin:     General: Skin is warm and dry.      Capillary Refill: Capillary refill takes 2 to 3 seconds.   Neurological:      Mental Status: She is alert and oriented to person, place, and time.      GCS: GCS eye subscore is 4. GCS verbal subscore is 5. GCS motor subscore is 6.   Psychiatric:         Attention and Perception: Attention normal.         " Mood and Affect: Mood normal.         Speech: Speech normal.         Behavior: Behavior normal. Behavior is cooperative.         Thought Content: Thought content normal.         Lab Results   Component Value Date    TRIG 79 02/05/2021    HDL 44 02/05/2021    LDL 29 02/05/2021    VLDL 16 02/05/2021    HGBA1C 8.33 (H) 02/05/2021        Assessment/Plan   Medicare Risks and Personalized Health Plan  CMS Preventative Services Quick Reference  Advance Directive Discussion  Cardiovascular risk  Fall Risk  Inactivity/Sedentary  Obesity/Overweight   Osteoprorosis Risk    The above risks/problems have been discussed with the patient.  Pertinent information has been shared with the patient in the After Visit Summary.  Follow up plans and orders are seen below in the Assessment/Plan Section.    Diagnoses and all orders for this visit:    1. Medicare annual wellness visit, subsequent (Primary)    2. Type 2 diabetes mellitus with other specified complication, with long-term current use of insulin (CMS/[facility])  Chronic issue stable requiring medication management and monitoring. Will eat well balanced heart healthy diabetic diet, drink adequate water, increase physical activity, and get adequate rest. Will monitor blood sugars daily and keep log for next appt. Will contact office earlier if blood sugars are averaging above 250.  Continue Trulicity and Humalog  -     Cancel: CBC (No Diff)  -     Cancel: Comprehensive Metabolic Panel  -     Hemoglobin A1c  -     MicroAlbumin, Urine, Random - Urine, Clean Catch    3. Essential hypertension  Chronic issue stable requiring medication management and monitoring. Will eat well balanced heart healthy diet, drink adequate water, increase physical activity, and get adequate rest. Monitor blood pressures daily and contact office for any readings consistently above 140/90. Patient will report any associated symptoms such as headaches, blurry vision, or nausea. Patient will go to ER for any chest  pressure or chest pain.   Continue Imdur, lisinopril, and metoprolol.  -     Cancel: CBC (No Diff)  -     Cancel: Comprehensive Metabolic Panel  -     Cancel: Lipid Panel    4. Thyroid disorder screening  -     TSH Rfx On Abnormal To Free T4    5. Encounter for vitamin deficiency screening  -     Vitamin B12 & Folate    6. Vitamin D deficiency  -     Vitamin D 25 Hydroxy          Follow Up:  Return in about 6 months (around 8/5/2021), or if symptoms worsen or fail to improve.     An After Visit Summary and PPPS were given to the patient.

## 2021-02-06 LAB
25(OH)D3 SERPL-MCNC: 52.2 NG/ML (ref 30–100)
ALBUMIN SERPL-MCNC: 4.3 G/DL (ref 3.5–5.2)
ALBUMIN UR-MCNC: 8.4 MG/DL
ALBUMIN/GLOB SERPL: 1.7 G/DL
ALP SERPL-CCNC: 65 U/L (ref 39–117)
ALT SERPL W P-5'-P-CCNC: 31 U/L (ref 1–33)
ANION GAP SERPL CALCULATED.3IONS-SCNC: 11.2 MMOL/L (ref 5–15)
AST SERPL-CCNC: 34 U/L (ref 1–32)
BILIRUB SERPL-MCNC: 0.2 MG/DL (ref 0–1.2)
BUN SERPL-MCNC: 14 MG/DL (ref 8–23)
BUN/CREAT SERPL: 11.3 (ref 7–25)
CALCIUM SPEC-SCNC: 10 MG/DL (ref 8.6–10.5)
CHLORIDE SERPL-SCNC: 102 MMOL/L (ref 98–107)
CHOLEST SERPL-MCNC: 89 MG/DL (ref 0–200)
CO2 SERPL-SCNC: 28.8 MMOL/L (ref 22–29)
CREAT SERPL-MCNC: 1.24 MG/DL (ref 0.57–1)
FOLATE SERPL-MCNC: 19.5 NG/ML (ref 4.78–24.2)
GFR SERPL CREATININE-BSD FRML MDRD: 53 ML/MIN/1.73
GLOBULIN UR ELPH-MCNC: 2.5 GM/DL
GLUCOSE SERPL-MCNC: 41 MG/DL (ref 65–99)
HDLC SERPL-MCNC: 44 MG/DL (ref 40–60)
LDLC SERPL CALC-MCNC: 29 MG/DL (ref 0–100)
LDLC/HDLC SERPL: 0.66 {RATIO}
POTASSIUM SERPL-SCNC: 4.3 MMOL/L (ref 3.5–5.2)
PROT SERPL-MCNC: 6.8 G/DL (ref 6–8.5)
SODIUM SERPL-SCNC: 142 MMOL/L (ref 136–145)
TRIGL SERPL-MCNC: 79 MG/DL (ref 0–150)
TSH SERPL DL<=0.05 MIU/L-ACNC: 1.76 UIU/ML (ref 0.27–4.2)
VIT B12 BLD-MCNC: 494 PG/ML (ref 211–946)
VLDLC SERPL-MCNC: 16 MG/DL (ref 5–40)

## 2021-02-11 ENCOUNTER — TRANSCRIBE ORDERS (OUTPATIENT)
Dept: ADMINISTRATIVE | Facility: HOSPITAL | Age: 61
End: 2021-02-11

## 2021-02-11 DIAGNOSIS — Z12.31 VISIT FOR SCREENING MAMMOGRAM: Primary | ICD-10-CM

## 2021-02-13 DIAGNOSIS — I25.810 CORONARY ARTERY DISEASE INVOLVING CORONARY BYPASS GRAFT WITHOUT ANGINA PECTORIS, UNSPECIFIED WHETHER NATIVE OR TRANSPLANTED HEART: ICD-10-CM

## 2021-02-15 RX ORDER — CLOPIDOGREL BISULFATE 75 MG/1
75 TABLET ORAL DAILY
Qty: 30 TABLET | Refills: 1 | Status: SHIPPED | OUTPATIENT
Start: 2021-02-15 | End: 2021-04-08

## 2021-02-17 DIAGNOSIS — F32.0 CURRENT MILD EPISODE OF MAJOR DEPRESSIVE DISORDER WITHOUT PRIOR EPISODE (HCC): ICD-10-CM

## 2021-02-17 RX ORDER — BUPROPION HYDROCHLORIDE 300 MG/1
300 TABLET ORAL DAILY
Qty: 30 TABLET | Refills: 1 | Status: SHIPPED | OUTPATIENT
Start: 2021-02-17 | End: 2021-04-29 | Stop reason: SDUPTHER

## 2021-03-08 RX ORDER — TRAZODONE HYDROCHLORIDE 150 MG/1
150 TABLET ORAL NIGHTLY
Qty: 30 TABLET | Refills: 3 | Status: SHIPPED | OUTPATIENT
Start: 2021-03-08 | End: 2021-04-29 | Stop reason: SDUPTHER

## 2021-03-10 ENCOUNTER — APPOINTMENT (OUTPATIENT)
Dept: MAMMOGRAPHY | Facility: HOSPITAL | Age: 61
End: 2021-03-10

## 2021-03-13 DIAGNOSIS — M62.838 MUSCLE SPASM: ICD-10-CM

## 2021-03-15 RX ORDER — METHOCARBAMOL 500 MG/1
TABLET, FILM COATED ORAL
Qty: 90 TABLET | Refills: 2 | Status: SHIPPED | OUTPATIENT
Start: 2021-03-15 | End: 2021-08-12

## 2021-04-08 DIAGNOSIS — I25.810 CORONARY ARTERY DISEASE INVOLVING CORONARY BYPASS GRAFT WITHOUT ANGINA PECTORIS, UNSPECIFIED WHETHER NATIVE OR TRANSPLANTED HEART: ICD-10-CM

## 2021-04-08 RX ORDER — CLOPIDOGREL BISULFATE 75 MG/1
75 TABLET ORAL DAILY
Qty: 90 TABLET | Refills: 1 | Status: SHIPPED | OUTPATIENT
Start: 2021-04-08 | End: 2021-10-08

## 2021-04-29 DIAGNOSIS — F32.0 CURRENT MILD EPISODE OF MAJOR DEPRESSIVE DISORDER WITHOUT PRIOR EPISODE (HCC): ICD-10-CM

## 2021-04-30 RX ORDER — BUPROPION HYDROCHLORIDE 300 MG/1
300 TABLET ORAL DAILY
Qty: 90 TABLET | Refills: 0 | Status: SHIPPED | OUTPATIENT
Start: 2021-04-30 | End: 2021-09-16 | Stop reason: SDUPTHER

## 2021-04-30 RX ORDER — TRAZODONE HYDROCHLORIDE 150 MG/1
150 TABLET ORAL NIGHTLY
Qty: 90 TABLET | Refills: 0 | Status: SHIPPED | OUTPATIENT
Start: 2021-04-30 | End: 2021-07-27

## 2021-07-27 RX ORDER — TRAZODONE HYDROCHLORIDE 150 MG/1
150 TABLET ORAL
Qty: 90 TABLET | Refills: 0 | Status: SHIPPED | OUTPATIENT
Start: 2021-07-27 | End: 2021-09-16 | Stop reason: SDUPTHER

## 2021-07-27 RX ORDER — TRAZODONE HYDROCHLORIDE 150 MG/1
TABLET ORAL
Qty: 90 TABLET | Refills: 0 | Status: SHIPPED | OUTPATIENT
Start: 2021-07-27 | End: 2021-07-27 | Stop reason: SDUPTHER

## 2021-07-27 NOTE — TELEPHONE ENCOUNTER
Caller: Jayna Ambrocio    Relationship: Self    Best call back number: [phone]    Medication needed:   Requested Prescriptions     Pending Prescriptions Disp Refills   • traZODone (DESYREL) 150 MG tablet 90 tablet 0     Sig: Take 1 tablet by mouth every night at bedtime.       When do you need the refill by: 07/27    What additional details did the patient provide when requesting the medication: PATIENT IS OUT OF THE MEDICATION     Does the patient have less than a 3 day supply:  [x] Yes  [] No    What is the patient's preferred pharmacy: [facility]Y #[id number] [address] [phone] [facility] [phone]

## 2021-08-12 DIAGNOSIS — M62.838 MUSCLE SPASM: ICD-10-CM

## 2021-08-12 RX ORDER — METHOCARBAMOL 500 MG/1
TABLET, FILM COATED ORAL
Qty: 90 TABLET | Refills: 2 | Status: SHIPPED | OUTPATIENT
Start: 2021-08-12 | End: 2021-12-30

## 2021-08-23 ENCOUNTER — TELEPHONE (OUTPATIENT)
Dept: INTERNAL MEDICINE | Facility: CLINIC | Age: 61
End: 2021-08-23

## 2021-08-23 NOTE — TELEPHONE ENCOUNTER
Caller: Jayna Ambrocio    Relationship: Self    Best call back number: [phone]    What is the best time to reach you: ANYTIME    Who are you requesting to speak with (clinical staff, provider,  specific staff member): CLINICAL STAFF    Do you know the name of the person who called: SELF    What was the call regarding: PATIENT STATES THAT SHE WOULD LIKE A CALL FROM THE NURSE. PATIENT WANTS TO SEE IF SHE NEEDS TO SCHEDULE AN APPOINTMENT FOR A PROBLEMS SHE'S HAVING.    Do you require a callback: YES

## 2021-08-24 NOTE — TELEPHONE ENCOUNTER
Returned pt call, pt stated that her granddaughter that currently loves in the household with her has tested positive for COVID. Pt stated that she would rather have telephone visit instead of coming into the office because of exposure risks. Previously scheduled appt for Friday has been changes to telehealth. Pt was encouraged to call office if matter changed. Pt needed nothing else at this time.

## 2021-08-24 NOTE — TELEPHONE ENCOUNTER
Caller: Jayna Ambrocio    Relationship: Self    Best call back number: [phone]     What is the best time to reach you: ANYTIME     Who are you requesting to speak with (clinical staff, provider,  specific staff member): CLINICAL STAFF    Do you know the name of the person who called: SELF     What was the call regarding: PATIENT WOULD LIKE TO KNOW IF SHE SHOULD COME IN OFFICE TO BE SEEN FOR APPOINTMENT ON 8/27/21 OR HAVE A TELEPHONE VISIT. PATIENT STATES HER PHONE DOES NOT HAVE VIDEO CAPABILITY.     Do you require a callback: YES

## 2021-09-03 ENCOUNTER — HOSPITAL ENCOUNTER (OUTPATIENT)
Dept: MAMMOGRAPHY | Facility: HOSPITAL | Age: 61
Discharge: HOME | End: 2021-09-03
Admitting: NURSE PRACTITIONER

## 2021-09-03 ENCOUNTER — APPOINTMENT (OUTPATIENT)
Dept: MAMMOGRAPHY | Facility: HOSPITAL | Age: 61
End: 2021-09-03

## 2021-09-03 DIAGNOSIS — Z12.31 VISIT FOR SCREENING MAMMOGRAM: ICD-10-CM

## 2021-09-03 PROCEDURE — 77067 SCR MAMMO BI INCL CAD: CPT | Performed by: RADIOLOGY

## 2021-09-03 PROCEDURE — 77063 BREAST TOMOSYNTHESIS BI: CPT

## 2021-09-03 PROCEDURE — 77063 BREAST TOMOSYNTHESIS BI: CPT | Performed by: RADIOLOGY

## 2021-09-03 PROCEDURE — 77067 SCR MAMMO BI INCL CAD: CPT

## 2021-09-15 ENCOUNTER — TELEMEDICINE (OUTPATIENT)
Dept: INTERNAL MEDICINE | Facility: CLINIC | Age: 61
End: 2021-09-15

## 2021-09-15 VITALS
RESPIRATION RATE: 16 BRPM | TEMPERATURE: 96.9 F | HEIGHT: 60 IN | SYSTOLIC BLOOD PRESSURE: 132 MMHG | OXYGEN SATURATION: 97 % | BODY MASS INDEX: 43.59 KG/M2 | DIASTOLIC BLOOD PRESSURE: 80 MMHG | HEART RATE: 77 BPM | WEIGHT: 222 LBS

## 2021-09-15 DIAGNOSIS — Z79.4 TYPE 2 DIABETES MELLITUS WITH OTHER SPECIFIED COMPLICATION, WITH LONG-TERM CURRENT USE OF INSULIN (HCC): Primary | ICD-10-CM

## 2021-09-15 DIAGNOSIS — G47.09 OTHER INSOMNIA: ICD-10-CM

## 2021-09-15 DIAGNOSIS — I10 ESSENTIAL HYPERTENSION: ICD-10-CM

## 2021-09-15 DIAGNOSIS — F41.8 DEPRESSION WITH ANXIETY: ICD-10-CM

## 2021-09-15 DIAGNOSIS — E11.69 TYPE 2 DIABETES MELLITUS WITH OTHER SPECIFIED COMPLICATION, WITH LONG-TERM CURRENT USE OF INSULIN (HCC): Primary | ICD-10-CM

## 2021-09-15 LAB
ALBUMIN SERPL-MCNC: 4.3 G/DL (ref 3.5–5.2)
ALBUMIN/GLOB SERPL: 1.5 G/DL
ALP SERPL-CCNC: 64 U/L (ref 39–117)
ALT SERPL W P-5'-P-CCNC: 28 U/L (ref 1–33)
ANION GAP SERPL CALCULATED.3IONS-SCNC: 9.5 MMOL/L (ref 5–15)
AST SERPL-CCNC: 22 U/L (ref 1–32)
BASOPHILS # BLD AUTO: 0.05 10*3/MM3 (ref 0–0.2)
BASOPHILS NFR BLD AUTO: 0.8 % (ref 0–1.5)
BILIRUB SERPL-MCNC: 0.2 MG/DL (ref 0–1.2)
BUN SERPL-MCNC: 15 MG/DL (ref 8–23)
BUN/CREAT SERPL: 10.3 (ref 7–25)
CALCIUM SPEC-SCNC: 9.4 MG/DL (ref 8.6–10.5)
CHLORIDE SERPL-SCNC: 101 MMOL/L (ref 98–107)
CO2 SERPL-SCNC: 28.5 MMOL/L (ref 22–29)
CREAT SERPL-MCNC: 1.45 MG/DL (ref 0.57–1)
DEPRECATED RDW RBC AUTO: 39.9 FL (ref 37–54)
EOSINOPHIL # BLD AUTO: 0.27 10*3/MM3 (ref 0–0.4)
EOSINOPHIL NFR BLD AUTO: 4.2 % (ref 0.3–6.2)
ERYTHROCYTE [DISTWIDTH] IN BLOOD BY AUTOMATED COUNT: 12.8 % (ref 12.3–15.4)
GFR SERPL CREATININE-BSD FRML MDRD: 44 ML/MIN/1.73
GLOBULIN UR ELPH-MCNC: 2.8 GM/DL
GLUCOSE SERPL-MCNC: 103 MG/DL (ref 65–99)
HBA1C MFR BLD: 5.6 %
HCT VFR BLD AUTO: 40.8 % (ref 34–46.6)
HGB BLD-MCNC: 13.5 G/DL (ref 12–15.9)
IMM GRANULOCYTES # BLD AUTO: 0.02 10*3/MM3 (ref 0–0.05)
IMM GRANULOCYTES NFR BLD AUTO: 0.3 % (ref 0–0.5)
LYMPHOCYTES # BLD AUTO: 1.67 10*3/MM3 (ref 0.7–3.1)
LYMPHOCYTES NFR BLD AUTO: 25.9 % (ref 19.6–45.3)
MCH RBC QN AUTO: 28.9 PG (ref 26.6–33)
MCHC RBC AUTO-ENTMCNC: 33.1 G/DL (ref 31.5–35.7)
MCV RBC AUTO: 87.4 FL (ref 79–97)
MONOCYTES # BLD AUTO: 0.52 10*3/MM3 (ref 0.1–0.9)
MONOCYTES NFR BLD AUTO: 8.1 % (ref 5–12)
NEUTROPHILS NFR BLD AUTO: 3.92 10*3/MM3 (ref 1.7–7)
NEUTROPHILS NFR BLD AUTO: 60.7 % (ref 42.7–76)
NRBC BLD AUTO-RTO: 0 /100 WBC (ref 0–0.2)
PLATELET # BLD AUTO: 266 10*3/MM3 (ref 140–450)
PMV BLD AUTO: 10 FL (ref 6–12)
POTASSIUM SERPL-SCNC: 4.5 MMOL/L (ref 3.5–5.2)
PROT SERPL-MCNC: 7.1 G/DL (ref 6–8.5)
RBC # BLD AUTO: 4.67 10*6/MM3 (ref 3.77–5.28)
SODIUM SERPL-SCNC: 139 MMOL/L (ref 136–145)
WBC # BLD AUTO: 6.45 10*3/MM3 (ref 3.4–10.8)

## 2021-09-15 PROCEDURE — 83036 HEMOGLOBIN GLYCOSYLATED A1C: CPT | Performed by: NURSE PRACTITIONER

## 2021-09-15 PROCEDURE — 80053 COMPREHEN METABOLIC PANEL: CPT | Performed by: NURSE PRACTITIONER

## 2021-09-15 PROCEDURE — 99214 OFFICE O/P EST MOD 30 MIN: CPT | Performed by: NURSE PRACTITIONER

## 2021-09-15 PROCEDURE — 85025 COMPLETE CBC W/AUTO DIFF WBC: CPT | Performed by: NURSE PRACTITIONER

## 2021-09-15 RX ORDER — DULAGLUTIDE 1.5 MG/.5ML
INJECTION, SOLUTION SUBCUTANEOUS
COMMUNITY
Start: 2021-07-15

## 2021-09-15 RX ORDER — LOSARTAN POTASSIUM 25 MG/1
25 TABLET ORAL DAILY
COMMUNITY
Start: 2021-07-15 | End: 2021-11-02

## 2021-09-16 ENCOUNTER — TELEPHONE (OUTPATIENT)
Dept: INTERNAL MEDICINE | Facility: CLINIC | Age: 61
End: 2021-09-16

## 2021-09-16 DIAGNOSIS — I25.810 CORONARY ARTERY DISEASE INVOLVING CORONARY BYPASS GRAFT WITHOUT ANGINA PECTORIS, UNSPECIFIED WHETHER NATIVE OR TRANSPLANTED HEART: ICD-10-CM

## 2021-09-16 DIAGNOSIS — E11.69 TYPE 2 DIABETES MELLITUS WITH OTHER SPECIFIED COMPLICATION, WITH LONG-TERM CURRENT USE OF INSULIN (HCC): ICD-10-CM

## 2021-09-16 DIAGNOSIS — F32.0 CURRENT MILD EPISODE OF MAJOR DEPRESSIVE DISORDER WITHOUT PRIOR EPISODE (HCC): ICD-10-CM

## 2021-09-16 DIAGNOSIS — Z79.4 TYPE 2 DIABETES MELLITUS WITH OTHER SPECIFIED COMPLICATION, WITH LONG-TERM CURRENT USE OF INSULIN (HCC): ICD-10-CM

## 2021-09-16 DIAGNOSIS — I10 ESSENTIAL HYPERTENSION: ICD-10-CM

## 2021-09-16 DIAGNOSIS — I73.9 PAD (PERIPHERAL ARTERY DISEASE) (HCC): ICD-10-CM

## 2021-09-16 RX ORDER — PEN NEEDLE, DIABETIC 31 GX5/16"
1 NEEDLE, DISPOSABLE MISCELLANEOUS 2 TIMES DAILY
Qty: 90 EACH | Refills: 2 | Status: SHIPPED | OUTPATIENT
Start: 2021-09-16

## 2021-09-16 RX ORDER — ROSUVASTATIN CALCIUM 40 MG/1
40 TABLET, COATED ORAL DAILY
Qty: 30 TABLET | Refills: 5 | Status: SHIPPED | OUTPATIENT
Start: 2021-09-16

## 2021-09-16 RX ORDER — TRAZODONE HYDROCHLORIDE 150 MG/1
150 TABLET ORAL
Qty: 90 TABLET | Refills: 0 | Status: SHIPPED | OUTPATIENT
Start: 2021-09-16 | End: 2022-02-07

## 2021-09-16 RX ORDER — ISOSORBIDE MONONITRATE 60 MG/1
60 TABLET, EXTENDED RELEASE ORAL EVERY MORNING
Qty: 90 TABLET | Refills: 2 | Status: SHIPPED | OUTPATIENT
Start: 2021-09-16

## 2021-09-16 RX ORDER — BLOOD SUGAR DIAGNOSTIC
STRIP MISCELLANEOUS
Qty: 100 EACH | Refills: 1 | Status: SHIPPED | OUTPATIENT
Start: 2021-09-16

## 2021-09-16 RX ORDER — BUPROPION HYDROCHLORIDE 300 MG/1
300 TABLET ORAL DAILY
Qty: 90 TABLET | Refills: 0 | Status: SHIPPED | OUTPATIENT
Start: 2021-09-16 | End: 2021-11-29

## 2021-09-16 RX ORDER — LISINOPRIL 2.5 MG/1
2.5 TABLET ORAL DAILY
Qty: 30 TABLET | Refills: 5 | Status: SHIPPED | OUTPATIENT
Start: 2021-09-16

## 2021-09-16 RX ORDER — INSULIN LISPRO 100 [IU]/ML
20 INJECTION, SUSPENSION SUBCUTANEOUS 2 TIMES DAILY
Qty: 5 PEN | Refills: 3 | Status: SHIPPED | OUTPATIENT
Start: 2021-09-16

## 2021-09-16 NOTE — TELEPHONE ENCOUNTER
If the patient will run out of medication over the weekend add that information to the additional details line. Send this encounter to the clinical pool.    Caller: Jayna Ambrocio    Relationship: Self    Best call back number: [phone]    Medication needed:     ALL MEDICATIONS        When do you need the refill by:     What additional details did the patient provide when requesting the medication: PATIENT STATED SHE FORGOT TO MENTION WHILE AT HER APPOINTMENT WITH DOCTOR ONEAL 9/15 TO REFILL ALL HER PRESCRIPTIONS.    Does the patient have less than a 3 day supply:  [x] Yes  [] No    What is the patient's preferred pharmacy: [facility]ACY #[id number] - [place], [place] - [address] [phone] [facility] [phone] [facility]           Thank you for sharing this information with me. I will send a message to the clinical team. Please allow 48 hours for the clinical staff to follow up on this request.”

## 2021-09-29 ENCOUNTER — TELEPHONE (OUTPATIENT)
Dept: INTERNAL MEDICINE | Facility: CLINIC | Age: 61
End: 2021-09-29

## 2021-10-07 DIAGNOSIS — I25.810 CORONARY ARTERY DISEASE INVOLVING CORONARY BYPASS GRAFT WITHOUT ANGINA PECTORIS, UNSPECIFIED WHETHER NATIVE OR TRANSPLANTED HEART: ICD-10-CM

## 2021-10-08 RX ORDER — CLOPIDOGREL BISULFATE 75 MG/1
75 TABLET ORAL DAILY
Qty: 90 TABLET | Refills: 1 | Status: SHIPPED | OUTPATIENT
Start: 2021-10-08

## 2021-10-21 DIAGNOSIS — Z12.11 SCREENING FOR COLON CANCER: Primary | ICD-10-CM

## 2021-10-21 RX ORDER — SODIUM, POTASSIUM,MAG SULFATES 17.5-3.13G
1 SOLUTION, RECONSTITUTED, ORAL ORAL TAKE AS DIRECTED
Qty: 354 ML | Refills: 0 | Status: SHIPPED | OUTPATIENT
Start: 2021-10-21 | End: 2021-11-17 | Stop reason: SDUPTHER

## 2021-11-05 ENCOUNTER — TELEPHONE (OUTPATIENT)
Dept: GASTROENTEROLOGY | Facility: CLINIC | Age: 61
End: 2021-11-05

## 2021-11-05 NOTE — TELEPHONE ENCOUNTER
Patient is seeing Dr Leone for a colonoscopy on 11/11.  She's taking blood thinners prescribed by her PCP and looks like she was seeing cardiology at .

## 2021-11-09 NOTE — TELEPHONE ENCOUNTER
I spoke with Ms Ambrocio. Cardiac clearance given. Patient stated she will hold Plavix for the next 3 days until after her procedure with Dr Leone.

## 2021-11-17 DIAGNOSIS — Z12.11 SCREENING FOR COLON CANCER: ICD-10-CM

## 2021-11-17 RX ORDER — SODIUM, POTASSIUM,MAG SULFATES 17.5-3.13G
1 SOLUTION, RECONSTITUTED, ORAL ORAL TAKE AS DIRECTED
Qty: 354 ML | Refills: 0 | Status: SHIPPED | OUTPATIENT
Start: 2021-11-17 | End: 2022-03-31

## 2021-11-26 DIAGNOSIS — F32.0 CURRENT MILD EPISODE OF MAJOR DEPRESSIVE DISORDER WITHOUT PRIOR EPISODE (HCC): ICD-10-CM

## 2021-11-29 RX ORDER — BUPROPION HYDROCHLORIDE 300 MG/1
300 TABLET ORAL DAILY
Qty: 90 TABLET | Refills: 0 | Status: SHIPPED | OUTPATIENT
Start: 2021-11-29 | End: 2022-02-07

## 2021-12-01 ENCOUNTER — OUTSIDE FACILITY SERVICE (OUTPATIENT)
Dept: GASTROENTEROLOGY | Facility: CLINIC | Age: 61
End: 2021-12-01

## 2021-12-01 PROCEDURE — 88305 TISSUE EXAM BY PATHOLOGIST: CPT | Performed by: INTERNAL MEDICINE

## 2021-12-01 PROCEDURE — 45385 COLONOSCOPY W/LESION REMOVAL: CPT | Performed by: INTERNAL MEDICINE

## 2021-12-02 ENCOUNTER — LAB REQUISITION (OUTPATIENT)
Dept: LAB | Facility: HOSPITAL | Age: 61
End: 2021-12-02

## 2021-12-02 DIAGNOSIS — K57.30 DIVERTICULOSIS OF LARGE INTESTINE WITHOUT PERFORATION OR ABSCESS WITHOUT BLEEDING: ICD-10-CM

## 2021-12-02 DIAGNOSIS — K64.8 OTHER HEMORRHOIDS: ICD-10-CM

## 2021-12-02 DIAGNOSIS — K63.5 POLYP OF COLON: ICD-10-CM

## 2021-12-02 DIAGNOSIS — Z86.010 PERSONAL HISTORY OF COLONIC POLYPS: ICD-10-CM

## 2021-12-30 DIAGNOSIS — M62.838 MUSCLE SPASM: ICD-10-CM

## 2021-12-30 RX ORDER — METHOCARBAMOL 500 MG/1
TABLET, FILM COATED ORAL
Qty: 90 TABLET | Refills: 2 | Status: SHIPPED | OUTPATIENT
Start: 2021-12-30 | End: 2022-04-07

## 2022-02-05 DIAGNOSIS — F32.0 CURRENT MILD EPISODE OF MAJOR DEPRESSIVE DISORDER WITHOUT PRIOR EPISODE: ICD-10-CM

## 2022-02-07 RX ORDER — BUPROPION HYDROCHLORIDE 300 MG/1
300 TABLET ORAL DAILY
Qty: 90 TABLET | Refills: 0 | Status: SHIPPED | OUTPATIENT
Start: 2022-02-07 | End: 2022-04-22

## 2022-02-07 RX ORDER — TRAZODONE HYDROCHLORIDE 150 MG/1
TABLET ORAL
Qty: 90 TABLET | Refills: 0 | Status: SHIPPED | OUTPATIENT
Start: 2022-02-07 | End: 2022-03-31

## 2022-03-31 ENCOUNTER — OFFICE VISIT (OUTPATIENT)
Dept: INTERNAL MEDICINE | Facility: CLINIC | Age: 62
End: 2022-03-31

## 2022-03-31 VITALS
SYSTOLIC BLOOD PRESSURE: 134 MMHG | OXYGEN SATURATION: 98 % | TEMPERATURE: 98.4 F | HEART RATE: 94 BPM | BODY MASS INDEX: 44.17 KG/M2 | DIASTOLIC BLOOD PRESSURE: 72 MMHG | HEIGHT: 60 IN | RESPIRATION RATE: 16 BRPM | WEIGHT: 225 LBS

## 2022-03-31 DIAGNOSIS — Z13.21 ENCOUNTER FOR VITAMIN DEFICIENCY SCREENING: ICD-10-CM

## 2022-03-31 DIAGNOSIS — F41.8 DEPRESSION WITH ANXIETY: ICD-10-CM

## 2022-03-31 DIAGNOSIS — Z13.29 THYROID DISORDER SCREENING: ICD-10-CM

## 2022-03-31 DIAGNOSIS — E55.9 VITAMIN D DEFICIENCY: ICD-10-CM

## 2022-03-31 DIAGNOSIS — I10 ESSENTIAL HYPERTENSION: ICD-10-CM

## 2022-03-31 DIAGNOSIS — Z13.0 SCREENING FOR BLOOD DISEASE: ICD-10-CM

## 2022-03-31 DIAGNOSIS — E78.2 MIXED HYPERLIPIDEMIA: ICD-10-CM

## 2022-03-31 DIAGNOSIS — E11.69 TYPE 2 DIABETES MELLITUS WITH OTHER SPECIFIED COMPLICATION, WITH LONG-TERM CURRENT USE OF INSULIN: Primary | ICD-10-CM

## 2022-03-31 DIAGNOSIS — Z13.220 LIPID SCREENING: ICD-10-CM

## 2022-03-31 DIAGNOSIS — Z79.4 TYPE 2 DIABETES MELLITUS WITH OTHER SPECIFIED COMPLICATION, WITH LONG-TERM CURRENT USE OF INSULIN: Primary | ICD-10-CM

## 2022-03-31 LAB
25(OH)D3 SERPL-MCNC: 47.4 NG/ML (ref 30–100)
CHOLEST SERPL-MCNC: 102 MG/DL (ref 0–200)
DEPRECATED RDW RBC AUTO: 41.9 FL (ref 37–54)
ERYTHROCYTE [DISTWIDTH] IN BLOOD BY AUTOMATED COUNT: 12.9 % (ref 12.3–15.4)
FOLATE SERPL-MCNC: >20 NG/ML (ref 4.78–24.2)
HBA1C MFR BLD: 6.2 %
HCT VFR BLD AUTO: 41.8 % (ref 34–46.6)
HDLC SERPL-MCNC: 48 MG/DL (ref 40–60)
HGB BLD-MCNC: 13.7 G/DL (ref 12–15.9)
LDLC SERPL CALC-MCNC: 38 MG/DL (ref 0–100)
LDLC/HDLC SERPL: 0.79 {RATIO}
MCH RBC QN AUTO: 29.7 PG (ref 26.6–33)
MCHC RBC AUTO-ENTMCNC: 32.8 G/DL (ref 31.5–35.7)
MCV RBC AUTO: 90.7 FL (ref 79–97)
PLATELET # BLD AUTO: 269 10*3/MM3 (ref 140–450)
PMV BLD AUTO: 10.3 FL (ref 6–12)
RBC # BLD AUTO: 4.61 10*6/MM3 (ref 3.77–5.28)
TRIGL SERPL-MCNC: 81 MG/DL (ref 0–150)
TSH SERPL DL<=0.05 MIU/L-ACNC: 2.71 UIU/ML (ref 0.27–4.2)
VIT B12 BLD-MCNC: 473 PG/ML (ref 211–946)
VLDLC SERPL-MCNC: 16 MG/DL (ref 5–40)
WBC NRBC COR # BLD: 5.97 10*3/MM3 (ref 3.4–10.8)

## 2022-03-31 PROCEDURE — 82306 VITAMIN D 25 HYDROXY: CPT | Performed by: NURSE PRACTITIONER

## 2022-03-31 PROCEDURE — 83036 HEMOGLOBIN GLYCOSYLATED A1C: CPT | Performed by: NURSE PRACTITIONER

## 2022-03-31 PROCEDURE — 80061 LIPID PANEL: CPT | Performed by: NURSE PRACTITIONER

## 2022-03-31 PROCEDURE — 85027 COMPLETE CBC AUTOMATED: CPT | Performed by: NURSE PRACTITIONER

## 2022-03-31 PROCEDURE — 99214 OFFICE O/P EST MOD 30 MIN: CPT | Performed by: NURSE PRACTITIONER

## 2022-03-31 PROCEDURE — 82746 ASSAY OF FOLIC ACID SERUM: CPT | Performed by: NURSE PRACTITIONER

## 2022-03-31 PROCEDURE — 82607 VITAMIN B-12: CPT | Performed by: NURSE PRACTITIONER

## 2022-03-31 PROCEDURE — 80053 COMPREHEN METABOLIC PANEL: CPT | Performed by: NURSE PRACTITIONER

## 2022-03-31 PROCEDURE — 3044F HG A1C LEVEL LT 7.0%: CPT | Performed by: NURSE PRACTITIONER

## 2022-03-31 PROCEDURE — 84443 ASSAY THYROID STIM HORMONE: CPT | Performed by: NURSE PRACTITIONER

## 2022-03-31 RX ORDER — LOSARTAN POTASSIUM 25 MG/1
25 TABLET ORAL DAILY
COMMUNITY
Start: 2021-10-26 | End: 2022-10-14

## 2022-03-31 RX ORDER — FLUOXETINE 10 MG/1
10 CAPSULE ORAL DAILY
Qty: 30 CAPSULE | Refills: 1 | Status: SHIPPED | OUTPATIENT
Start: 2022-03-31 | End: 2022-05-06 | Stop reason: SDUPTHER

## 2022-03-31 RX ORDER — QUETIAPINE FUMARATE 25 MG/1
TABLET, FILM COATED ORAL
Qty: 45 TABLET | Refills: 1 | Status: SHIPPED | OUTPATIENT
Start: 2022-03-31 | End: 2022-05-23

## 2022-04-01 LAB
ALBUMIN SERPL-MCNC: 4.6 G/DL (ref 3.5–5.2)
ALBUMIN/GLOB SERPL: 2 G/DL
ALP SERPL-CCNC: 55 U/L (ref 39–117)
ALT SERPL W P-5'-P-CCNC: 63 U/L (ref 1–33)
ANION GAP SERPL CALCULATED.3IONS-SCNC: 10 MMOL/L (ref 5–15)
AST SERPL-CCNC: 57 U/L (ref 1–32)
BILIRUB SERPL-MCNC: 0.2 MG/DL (ref 0–1.2)
BUN SERPL-MCNC: 15 MG/DL (ref 8–23)
BUN/CREAT SERPL: 12 (ref 7–25)
CALCIUM SPEC-SCNC: 9.5 MG/DL (ref 8.6–10.5)
CHLORIDE SERPL-SCNC: 104 MMOL/L (ref 98–107)
CO2 SERPL-SCNC: 27 MMOL/L (ref 22–29)
CREAT SERPL-MCNC: 1.25 MG/DL (ref 0.57–1)
EGFRCR SERPLBLD CKD-EPI 2021: 48.8 ML/MIN/1.73
GLOBULIN UR ELPH-MCNC: 2.3 GM/DL
GLUCOSE SERPL-MCNC: 35 MG/DL (ref 65–99)
POTASSIUM SERPL-SCNC: 4.1 MMOL/L (ref 3.5–5.2)
PROT SERPL-MCNC: 6.9 G/DL (ref 6–8.5)
SODIUM SERPL-SCNC: 141 MMOL/L (ref 136–145)

## 2022-04-06 DIAGNOSIS — M62.838 MUSCLE SPASM: ICD-10-CM

## 2022-04-07 RX ORDER — METHOCARBAMOL 500 MG/1
TABLET, FILM COATED ORAL
Qty: 90 TABLET | Refills: 2 | Status: SHIPPED | OUTPATIENT
Start: 2022-04-07 | End: 2022-09-20

## 2022-04-11 ENCOUNTER — TELEPHONE (OUTPATIENT)
Dept: INTERNAL MEDICINE | Facility: CLINIC | Age: 62
End: 2022-04-11

## 2022-04-11 NOTE — TELEPHONE ENCOUNTER
Caller: Jayna Ambrocio    Relationship: Self    Best call back number: [phone]    What medications are you currently taking:   Current Outpatient Medications on File Prior to Visit   Medication Sig Dispense Refill   • ACCU-CHEK SOFTCLIX LANCETS lancets USE TO TEST BLOOD GLUCOSE THREE TIMES DAILY AS DIRECTED  5   • Aspirin 81 MG capsule Take 1 tablet by mouth Daily. 90 capsule 1   • B-D UF III MINI PEN NEEDLES 31G X 5 MM misc Use with insulin twice a day 60 each 11   • B-D ULTRAFINE III SHORT PEN 31G X 8 MM misc Inject 1 unit marking on U-100 syringe as directed 2 (Two) Times a Day. 90 each 2   • Blood Glucose Monitoring Suppl (ACCU-CHEK MARIO PLUS) w/Device kit use to test blood glucose three times daily  5   • buPROPion XL (WELLBUTRIN XL) 300 MG 24 hr tablet TAKE 1 TABLET BY MOUTH DAILY. NEEDS APPT FOR FURTHER REFILLS. 90 tablet 0   • Cholecalciferol (VITAMIN D-3) 25 MCG (1000 UT) capsule Take 1,000 Units by mouth Daily.     • clopidogrel (PLAVIX) 75 MG tablet TAKE 1 TABLET BY MOUTH DAILY. PATIENT MUST KEEP APPOINTMENT FOR FURTHER REFILLS. 90 tablet 1   • FLUoxetine (PROzac) 10 MG capsule Take 1 capsule by mouth Daily. 30 capsule 1   • glucose blood (OneTouch Verio) test strip TEST THREE TIMES A DAY E11.9 100 each 1   • Insulin Lispro Prot & Lispro (HumaLOG Mix 75/25 KwikPen) (75-25) 100 UNIT/ML suspension pen-injector pen Inject 20 Units under the skin into the appropriate area as directed 2 (Two) Times a Day. 5 pen 3   • isosorbide mononitrate (IMDUR) 60 MG 24 hr tablet Take 1 tablet by mouth Every Morning. 90 tablet 2   • lisinopril (PRINIVIL,ZESTRIL) 2.5 MG tablet Take 1 tablet by mouth Daily. 30 tablet 5   • losartan (COZAAR) 25 MG tablet Take 25 mg by mouth Daily.     • methocarbamol (ROBAXIN) 500 MG tablet TAKE 1 TABLET BY MOUTH THREE TIMES A DAY AS NEEDED FOR MUSCLE SPASM 90 tablet 2   • metoprolol tartrate (LOPRESSOR) 50 MG tablet Take 50 mg by mouth 2 (Two) Times a Day.     • pantoprazole  (PROTONIX) 40 MG EC tablet TAKE 1 TABLET BY MOUTH EVERY DAY 90 tablet 0   • QUEtiapine (SEROquel) 25 MG tablet Take 1-2 tablets 30 minutes before bedtime as needed for sleep. 45 tablet 1   • rosuvastatin (CRESTOR) 40 MG tablet Take 1 tablet by mouth Daily. 200001 30 tablet 5   • Specialty Vitamins Products (HEALTHY HEART PO) Take 400 unit marking on U-100 syringe by mouth Daily.     • Trulicity 1.5 MG/0.5ML solution pen-injector INJECT 1.5 MG UNDER THE SKIN 1 (ONE) TIME PER WEEK.       No current facility-administered medications on file prior to visit.          When did you start taking these medications: NA    Which medication are you concerned about: TRALINDSAY    Who prescribed you this medication: BASSAM NO    What are your concerns: THIS MEDICATION WAS TO HAVE CHANGED OR THE DOSAGE, PATIENT IS UNSURE OF WHICH, HOWEVER ITS NOT AT THE PHARMACY; PATIENT HAS ABOUT 2 LEFT    formerly [facility]     PLEASE CALL TO ADVISE

## 2022-04-22 DIAGNOSIS — F32.0 CURRENT MILD EPISODE OF MAJOR DEPRESSIVE DISORDER WITHOUT PRIOR EPISODE: ICD-10-CM

## 2022-04-22 RX ORDER — BUPROPION HYDROCHLORIDE 300 MG/1
300 TABLET ORAL DAILY
Qty: 90 TABLET | Refills: 0 | Status: SHIPPED | OUTPATIENT
Start: 2022-04-22 | End: 2022-10-12

## 2022-04-22 RX ORDER — TRAZODONE HYDROCHLORIDE 150 MG/1
TABLET ORAL
Qty: 90 TABLET | Refills: 0 | Status: SHIPPED | OUTPATIENT
Start: 2022-04-22 | End: 2022-07-08 | Stop reason: SDUPTHER

## 2022-05-01 NOTE — PROGRESS NOTES
Subjective   Chief Complaint   Patient presents with   • Diabetes      Jayna Ambrocio is a 61 y.o. female here today for diabetes, hypertension, hyperlipidemia, depression, and anxiety.  A1c is 6.2 today.  Blood sugar has been well controlled.  She is following a low-cholesterol diabetic diet.  She is tolerating her medications well.  Blood pressure stable and at goal.  She has had increased depression and anxiety with insomnia.She is taking trazodone that helps some. Has been taking wellbutrin for some time that is not working as well.     I have reviewed the following portions of the patient's history and confirmed they are accurate: allergies, current medications, past family history, past medical history, past social history, past surgical history and problem list    I have personally completed the patient's review of systems.    Review of Systems   Constitutional: Negative for activity change, appetite change, chills, fever, unexpected weight gain and unexpected weight loss.   HENT: Negative for congestion, ear pain, nosebleeds, postnasal drip, sore throat, trouble swallowing and voice change.    Eyes: Negative for blurred vision, pain, itching and visual disturbance.   Respiratory: Negative for cough and shortness of breath.    Cardiovascular: Negative for chest pain and palpitations.   Gastrointestinal: Negative for blood in stool, diarrhea, nausea and vomiting.   Endocrine: Negative for polydipsia, polyphagia and polyuria.   Genitourinary: Negative for dysuria, flank pain, frequency, genital sores, hematuria and urgency.   Musculoskeletal: Negative for arthralgias and myalgias.   Skin: Negative for rash and skin lesions.   Allergic/Immunologic: Negative for environmental allergies, food allergies and immunocompromised state.   Neurological: Negative for dizziness, seizures, weakness, headache, memory problem and confusion.   Psychiatric/Behavioral: Positive for sleep disturbance and depressed mood.  Negative for self-injury and suicidal ideas. The patient is nervous/anxious.        Current Outpatient Medications on File Prior to Visit   Medication Sig   • ACCU-CHEK SOFTCLIX LANCETS lancets USE TO TEST BLOOD GLUCOSE THREE TIMES DAILY AS DIRECTED   • Aspirin 81 MG capsule Take 1 tablet by mouth Daily.   • B-D UF III MINI PEN NEEDLES 31G X 5 MM misc Use with insulin twice a day   • B-D ULTRAFINE III SHORT PEN 31G X 8 MM misc Inject 1 unit marking on U-100 syringe as directed 2 (Two) Times a Day.   • Blood Glucose Monitoring Suppl (ACCU-CHEK MARIO PLUS) w/Device kit use to test blood glucose three times daily   • Cholecalciferol (VITAMIN D-3) 25 MCG (1000 UT) capsule Take 1,000 Units by mouth Daily.   • clopidogrel (PLAVIX) 75 MG tablet TAKE 1 TABLET BY MOUTH DAILY. PATIENT MUST KEEP APPOINTMENT FOR FURTHER REFILLS.   • glucose blood (OneTouch Verio) test strip TEST THREE TIMES A DAY E11.9   • Insulin Lispro Prot & Lispro (HumaLOG Mix 75/25 KwikPen) (75-25) 100 UNIT/ML suspension pen-injector pen Inject 20 Units under the skin into the appropriate area as directed 2 (Two) Times a Day.   • isosorbide mononitrate (IMDUR) 60 MG 24 hr tablet Take 1 tablet by mouth Every Morning.   • lisinopril (PRINIVIL,ZESTRIL) 2.5 MG tablet Take 1 tablet by mouth Daily.   • losartan (COZAAR) 25 MG tablet Take 25 mg by mouth Daily.   • metoprolol tartrate (LOPRESSOR) 50 MG tablet Take 50 mg by mouth 2 (Two) Times a Day.   • pantoprazole (PROTONIX) 40 MG EC tablet TAKE 1 TABLET BY MOUTH EVERY DAY   • rosuvastatin (CRESTOR) 40 MG tablet Take 1 tablet by mouth Daily. 200001   • Specialty Vitamins Products (HEALTHY HEART PO) Take 400 unit marking on U-100 syringe by mouth Daily.   • Trulicity 1.5 MG/0.5ML solution pen-injector INJECT 1.5 MG UNDER THE SKIN 1 (ONE) TIME PER WEEK.     No current facility-administered medications on file prior to visit.       Objective   Vitals:    03/31/22 1014   BP: 134/72   Pulse: 94   Resp: 16   Temp:  "98.4 °F (36.9 °C)   TempSrc: Temporal   SpO2: 98%   Weight: 102 kg (225 lb)   Height: 152.4 cm (60\")     Body mass index is 43.94 kg/m².    Physical Exam  Vitals reviewed.   Constitutional:       Appearance: Normal appearance. She is well-developed.   HENT:      Head: Normocephalic and atraumatic.      Nose: Nose normal.   Eyes:      General: Lids are normal.      Conjunctiva/sclera: Conjunctivae normal.      Pupils: Pupils are equal, round, and reactive to light.   Neck:      Thyroid: No thyromegaly.      Trachea: Trachea normal.   Cardiovascular:      Rate and Rhythm: Normal rate and regular rhythm.      Heart sounds: Normal heart sounds.   Pulmonary:      Effort: Pulmonary effort is normal. No respiratory distress.      Breath sounds: Normal breath sounds.   Skin:     General: Skin is warm and dry.   Neurological:      Mental Status: She is alert and oriented to person, place, and time.      GCS: GCS eye subscore is 4. GCS verbal subscore is 5. GCS motor subscore is 6.   Psychiatric:         Attention and Perception: Attention normal.         Mood and Affect: Mood normal.         Speech: Speech normal.         Behavior: Behavior normal. Behavior is cooperative.         Thought Content: Thought content normal.         Assessment/Plan   Problem List Items Addressed This Visit        Cardiac and Vasculature    Essential hypertension  Chronic stable. Continue losartan and Imdur.       Relevant Medications    losartan (COZAAR) 25 MG tablet    Other Relevant Orders    CBC (No Diff) (Completed)    Comprehensive Metabolic Panel (Completed)    Lipid Panel (Completed)       Endocrine and Metabolic    Type 2 diabetes mellitus (HCC) - Primary  Chronic stable. Continue lispro and trulicity Follow diabetic diet.       Relevant Orders    POCT glycated hemoglobin, total (Completed)    CBC (No Diff) (Completed)    Comprehensive Metabolic Panel (Completed)      Other Visit Diagnoses     Mixed hyperlipidemia      Chronic stable. " Continue crestor. Follow low cholesterol diet.       Relevant Orders    Lipid Panel (Completed)    Depression with anxiety      Chronic unstable. Start prozac daily and seroquel at night. Continue wellbutrin and trazodone.      Relevant Medications    QUEtiapine (SEROquel) 25 MG tablet    FLUoxetine (PROzac) 10 MG capsule    Screening for blood disease        Relevant Orders    CBC (No Diff) (Completed)    Comprehensive Metabolic Panel (Completed)    Lipid Panel (Completed)    TSH Rfx On Abnormal To Free T4 (Completed)    Vitamin B12 & Folate (Completed)    Vitamin D 25 Hydroxy (Completed)    Thyroid disorder screening        Relevant Orders    TSH Rfx On Abnormal To Free T4 (Completed)    Lipid screening        Relevant Orders    Lipid Panel (Completed)    Encounter for vitamin deficiency screening        Relevant Orders    Vitamin B12 & Folate (Completed)    Vitamin D 25 Hydroxy (Completed)    Vitamin D deficiency        Relevant Orders    Vitamin D 25 Hydroxy (Completed)             Current Outpatient Medications:   •  ACCU-CHEK SOFTCLIX LANCETS lancets, USE TO TEST BLOOD GLUCOSE THREE TIMES DAILY AS DIRECTED, Disp: , Rfl: 5  •  Aspirin 81 MG capsule, Take 1 tablet by mouth Daily., Disp: 90 capsule, Rfl: 1  •  B-D UF III MINI PEN NEEDLES 31G X 5 MM misc, Use with insulin twice a day, Disp: 60 each, Rfl: 11  •  B-D ULTRAFINE III SHORT PEN 31G X 8 MM misc, Inject 1 unit marking on U-100 syringe as directed 2 (Two) Times a Day., Disp: 90 each, Rfl: 2  •  Blood Glucose Monitoring Suppl (ACCU-CHEK MARIO PLUS) w/Device kit, use to test blood glucose three times daily, Disp: , Rfl: 5  •  Cholecalciferol (VITAMIN D-3) 25 MCG (1000 UT) capsule, Take 1,000 Units by mouth Daily., Disp: , Rfl:   •  clopidogrel (PLAVIX) 75 MG tablet, TAKE 1 TABLET BY MOUTH DAILY. PATIENT MUST KEEP APPOINTMENT FOR FURTHER REFILLS., Disp: 90 tablet, Rfl: 1  •  glucose blood (OneTouch Verio) test strip, TEST THREE TIMES A DAY E11.9, Disp: 100  each, Rfl: 1  •  Insulin Lispro Prot & Lispro (HumaLOG Mix 75/25 KwikPen) (75-25) 100 UNIT/ML suspension pen-injector pen, Inject 20 Units under the skin into the appropriate area as directed 2 (Two) Times a Day., Disp: 5 pen, Rfl: 3  •  isosorbide mononitrate (IMDUR) 60 MG 24 hr tablet, Take 1 tablet by mouth Every Morning., Disp: 90 tablet, Rfl: 2  •  lisinopril (PRINIVIL,ZESTRIL) 2.5 MG tablet, Take 1 tablet by mouth Daily., Disp: 30 tablet, Rfl: 5  •  losartan (COZAAR) 25 MG tablet, Take 25 mg by mouth Daily., Disp: , Rfl:   •  metoprolol tartrate (LOPRESSOR) 50 MG tablet, Take 50 mg by mouth 2 (Two) Times a Day., Disp: , Rfl:   •  pantoprazole (PROTONIX) 40 MG EC tablet, TAKE 1 TABLET BY MOUTH EVERY DAY, Disp: 90 tablet, Rfl: 0  •  rosuvastatin (CRESTOR) 40 MG tablet, Take 1 tablet by mouth Daily. 200001, Disp: 30 tablet, Rfl: 5  •  Specialty Vitamins Products (HEALTHY HEART PO), Take 400 unit marking on U-100 syringe by mouth Daily., Disp: , Rfl:   •  Trulicity 1.5 MG/0.5ML solution pen-injector, INJECT 1.5 MG UNDER THE SKIN 1 (ONE) TIME PER WEEK., Disp: , Rfl:   •  buPROPion XL (WELLBUTRIN XL) 300 MG 24 hr tablet, TAKE 1 TABLET BY MOUTH DAILY. NEEDS APPT FOR FURTHER REFILLS., Disp: 90 tablet, Rfl: 0  •  FLUoxetine (PROzac) 10 MG capsule, Take 1 capsule by mouth Daily., Disp: 30 capsule, Rfl: 1  •  methocarbamol (ROBAXIN) 500 MG tablet, TAKE 1 TABLET BY MOUTH THREE TIMES A DAY AS NEEDED FOR MUSCLE SPASM, Disp: 90 tablet, Rfl: 2  •  QUEtiapine (SEROquel) 25 MG tablet, Take 1-2 tablets 30 minutes before bedtime as needed for sleep., Disp: 45 tablet, Rfl: 1  •  traZODone (DESYREL) 150 MG tablet, TAKE 1 TABLET BY MOUTH EVERYDAY AT BEDTIME, Disp: 90 tablet, Rfl: 0       Plan of care reviewed with the patient at the conclusion of today's visit.  Education was provided regarding diagnosis, management, and any prescribed or recommended OTC medications.  Patient verbalized understanding of and agreement with management  plan.     Return in about 3 months (around 6/30/2022), or if symptoms worsen or fail to improve.      Sidra Moreno, APRN

## 2022-05-06 DIAGNOSIS — F41.8 DEPRESSION WITH ANXIETY: ICD-10-CM

## 2022-05-06 RX ORDER — FLUOXETINE 10 MG/1
10 CAPSULE ORAL DAILY
Qty: 90 CAPSULE | Refills: 1 | Status: SHIPPED | OUTPATIENT
Start: 2022-05-06 | End: 2022-10-14 | Stop reason: SDUPTHER

## 2022-05-21 DIAGNOSIS — F41.8 DEPRESSION WITH ANXIETY: ICD-10-CM

## 2022-05-23 RX ORDER — QUETIAPINE FUMARATE 25 MG/1
TABLET, FILM COATED ORAL
Qty: 45 TABLET | Refills: 1 | Status: SHIPPED | OUTPATIENT
Start: 2022-05-23 | End: 2022-07-08 | Stop reason: SDUPTHER

## 2022-07-08 ENCOUNTER — OFFICE VISIT (OUTPATIENT)
Dept: INTERNAL MEDICINE | Facility: CLINIC | Age: 62
End: 2022-07-08

## 2022-07-08 ENCOUNTER — LAB (OUTPATIENT)
Dept: LAB | Facility: HOSPITAL | Age: 62
End: 2022-07-08

## 2022-07-08 VITALS
WEIGHT: 229 LBS | HEIGHT: 62 IN | OXYGEN SATURATION: 97 % | SYSTOLIC BLOOD PRESSURE: 132 MMHG | BODY MASS INDEX: 42.14 KG/M2 | HEART RATE: 77 BPM | DIASTOLIC BLOOD PRESSURE: 78 MMHG

## 2022-07-08 DIAGNOSIS — Z79.4 TYPE 2 DIABETES MELLITUS WITH OTHER SPECIFIED COMPLICATION, WITH LONG-TERM CURRENT USE OF INSULIN: Primary | ICD-10-CM

## 2022-07-08 DIAGNOSIS — R74.8 ELEVATED LIVER ENZYMES: ICD-10-CM

## 2022-07-08 DIAGNOSIS — E11.69 TYPE 2 DIABETES MELLITUS WITH OTHER SPECIFIED COMPLICATION, WITH LONG-TERM CURRENT USE OF INSULIN: Primary | ICD-10-CM

## 2022-07-08 DIAGNOSIS — G47.09 OTHER INSOMNIA: ICD-10-CM

## 2022-07-08 DIAGNOSIS — I10 ESSENTIAL HYPERTENSION: ICD-10-CM

## 2022-07-08 DIAGNOSIS — N28.9 FUNCTION KIDNEY DECREASED: ICD-10-CM

## 2022-07-08 LAB
ALBUMIN SERPL-MCNC: 4.1 G/DL (ref 3.5–5.2)
ALBUMIN/GLOB SERPL: 1.6 G/DL
ALP SERPL-CCNC: 66 U/L (ref 39–117)
ALT SERPL W P-5'-P-CCNC: 70 U/L (ref 1–33)
ANION GAP SERPL CALCULATED.3IONS-SCNC: 10.1 MMOL/L (ref 5–15)
AST SERPL-CCNC: 45 U/L (ref 1–32)
BASOPHILS # BLD AUTO: 0.04 10*3/MM3 (ref 0–0.2)
BASOPHILS NFR BLD AUTO: 0.5 % (ref 0–1.5)
BILIRUB SERPL-MCNC: 0.3 MG/DL (ref 0–1.2)
BUN SERPL-MCNC: 15 MG/DL (ref 8–23)
BUN/CREAT SERPL: 11.7 (ref 7–25)
CALCIUM SPEC-SCNC: 9 MG/DL (ref 8.6–10.5)
CHLORIDE SERPL-SCNC: 102 MMOL/L (ref 98–107)
CO2 SERPL-SCNC: 28.9 MMOL/L (ref 22–29)
CREAT SERPL-MCNC: 1.28 MG/DL (ref 0.57–1)
DEPRECATED RDW RBC AUTO: 38.7 FL (ref 37–54)
EGFRCR SERPLBLD CKD-EPI 2021: 47.5 ML/MIN/1.73
EOSINOPHIL # BLD AUTO: 0.31 10*3/MM3 (ref 0–0.4)
EOSINOPHIL NFR BLD AUTO: 4 % (ref 0.3–6.2)
ERYTHROCYTE [DISTWIDTH] IN BLOOD BY AUTOMATED COUNT: 12.4 % (ref 12.3–15.4)
GLOBULIN UR ELPH-MCNC: 2.6 GM/DL
GLUCOSE SERPL-MCNC: 68 MG/DL (ref 65–99)
HBA1C MFR BLD: 6.2 % (ref 4.8–5.6)
HCT VFR BLD AUTO: 39.3 % (ref 34–46.6)
HGB BLD-MCNC: 13.4 G/DL (ref 12–15.9)
IMM GRANULOCYTES # BLD AUTO: 0.02 10*3/MM3 (ref 0–0.05)
IMM GRANULOCYTES NFR BLD AUTO: 0.3 % (ref 0–0.5)
LYMPHOCYTES # BLD AUTO: 1.84 10*3/MM3 (ref 0.7–3.1)
LYMPHOCYTES NFR BLD AUTO: 23.6 % (ref 19.6–45.3)
MCH RBC QN AUTO: 29.8 PG (ref 26.6–33)
MCHC RBC AUTO-ENTMCNC: 34.1 G/DL (ref 31.5–35.7)
MCV RBC AUTO: 87.3 FL (ref 79–97)
MONOCYTES # BLD AUTO: 0.61 10*3/MM3 (ref 0.1–0.9)
MONOCYTES NFR BLD AUTO: 7.8 % (ref 5–12)
NEUTROPHILS NFR BLD AUTO: 4.99 10*3/MM3 (ref 1.7–7)
NEUTROPHILS NFR BLD AUTO: 63.8 % (ref 42.7–76)
NRBC BLD AUTO-RTO: 0 /100 WBC (ref 0–0.2)
PLATELET # BLD AUTO: 244 10*3/MM3 (ref 140–450)
PMV BLD AUTO: 9.9 FL (ref 6–12)
POTASSIUM SERPL-SCNC: 4.4 MMOL/L (ref 3.5–5.2)
PROT SERPL-MCNC: 6.7 G/DL (ref 6–8.5)
RBC # BLD AUTO: 4.5 10*6/MM3 (ref 3.77–5.28)
SODIUM SERPL-SCNC: 141 MMOL/L (ref 136–145)
WBC NRBC COR # BLD: 7.81 10*3/MM3 (ref 3.4–10.8)

## 2022-07-08 PROCEDURE — 80053 COMPREHEN METABOLIC PANEL: CPT | Performed by: NURSE PRACTITIONER

## 2022-07-08 PROCEDURE — 99214 OFFICE O/P EST MOD 30 MIN: CPT | Performed by: NURSE PRACTITIONER

## 2022-07-08 PROCEDURE — 83036 HEMOGLOBIN GLYCOSYLATED A1C: CPT | Performed by: NURSE PRACTITIONER

## 2022-07-08 PROCEDURE — 85025 COMPLETE CBC W/AUTO DIFF WBC: CPT | Performed by: NURSE PRACTITIONER

## 2022-07-08 RX ORDER — QUETIAPINE FUMARATE 25 MG/1
25 TABLET, FILM COATED ORAL NIGHTLY
Qty: 90 TABLET | Refills: 1 | Status: SHIPPED | OUTPATIENT
Start: 2022-07-08 | End: 2022-09-15

## 2022-07-08 RX ORDER — TRAZODONE HYDROCHLORIDE 150 MG/1
150 TABLET ORAL
Qty: 90 TABLET | Refills: 1 | Status: SHIPPED | OUTPATIENT
Start: 2022-07-08 | End: 2022-12-29

## 2022-07-08 NOTE — PROGRESS NOTES
Subjective   Chief Complaint   Patient presents with   • liver and kidney check       Jayna Ambrocio is a 61 y.o. female.     The patient is here today for diabetes, hypertension, and lab recheck.    Blood sugars are doing well. Last A1C was 6.2. she is following diabetic diet and tolerating meds well. BP is stable and at goal. She is sleeping well with trazodone and seroquel together. She is trying to drink adequate amount of water and eat better. Last set of labs showed decrease in kidney function and elevated liver enzymes. Sees endocrinology and nephrology at .     I have reviewed the following portions of the patient's history and confirmed they are accurate: allergies, current medications, past family history, past medical history, past social history, past surgical history, and problem list    I have personally completed the patient's review of systems.    Review of Systems   Constitutional: Negative for activity change, appetite change, chills, diaphoresis, fatigue, fever, unexpected weight gain and unexpected weight loss.   HENT: Negative for congestion, ear discharge, ear pain, mouth sores, nosebleeds, postnasal drip, sinus pressure, sneezing, sore throat, trouble swallowing and voice change.    Eyes: Negative for blurred vision, pain, discharge, itching and visual disturbance.   Respiratory: Negative for cough, chest tightness, shortness of breath and wheezing.    Cardiovascular: Negative for chest pain, palpitations and leg swelling.   Gastrointestinal: Negative for abdominal pain, blood in stool, constipation, diarrhea, nausea and vomiting.   Endocrine: Negative for heat intolerance, polydipsia, polyphagia and polyuria.   Genitourinary: Negative for dysuria, flank pain, frequency, genital sores, hematuria and urgency.   Musculoskeletal: Negative for arthralgias, back pain, gait problem, joint swelling, myalgias, neck pain and neck stiffness.   Skin: Negative for color change, pallor, rash and  skin lesions.   Allergic/Immunologic: Negative for environmental allergies, food allergies and immunocompromised state.   Neurological: Negative for dizziness, seizures, speech difficulty, weakness, numbness, headache, memory problem and confusion.   Hematological: Negative for adenopathy.   Psychiatric/Behavioral: Positive for sleep disturbance. Negative for agitation, decreased concentration, self-injury, suicidal ideas and depressed mood. The patient is not nervous/anxious.        Current Outpatient Medications on File Prior to Visit   Medication Sig   • ACCU-CHEK SOFTCLIX LANCETS lancets USE TO TEST BLOOD GLUCOSE THREE TIMES DAILY AS DIRECTED   • Aspirin 81 MG capsule Take 1 tablet by mouth Daily.   • B-D UF III MINI PEN NEEDLES 31G X 5 MM misc Use with insulin twice a day   • B-D ULTRAFINE III SHORT PEN 31G X 8 MM misc Inject 1 unit marking on U-100 syringe as directed 2 (Two) Times a Day.   • Blood Glucose Monitoring Suppl (ACCU-CHEK MARIO PLUS) w/Device kit use to test blood glucose three times daily   • buPROPion XL (WELLBUTRIN XL) 300 MG 24 hr tablet TAKE 1 TABLET BY MOUTH DAILY. NEEDS APPT FOR FURTHER REFILLS.   • Cholecalciferol (VITAMIN D-3) 25 MCG (1000 UT) capsule Take 1,000 Units by mouth Daily.   • clopidogrel (PLAVIX) 75 MG tablet TAKE 1 TABLET BY MOUTH DAILY. PATIENT MUST KEEP APPOINTMENT FOR FURTHER REFILLS.   • FLUoxetine (PROzac) 10 MG capsule Take 1 capsule by mouth Daily.   • glucose blood (OneTouch Verio) test strip TEST THREE TIMES A DAY E11.9   • Insulin Lispro Prot & Lispro (HumaLOG Mix 75/25 KwikPen) (75-25) 100 UNIT/ML suspension pen-injector pen Inject 20 Units under the skin into the appropriate area as directed 2 (Two) Times a Day.   • isosorbide mononitrate (IMDUR) 60 MG 24 hr tablet Take 1 tablet by mouth Every Morning.   • lisinopril (PRINIVIL,ZESTRIL) 2.5 MG tablet Take 1 tablet by mouth Daily.   • losartan (COZAAR) 25 MG tablet Take 25 mg by mouth Daily.   • methocarbamol  "(ROBAXIN) 500 MG tablet TAKE 1 TABLET BY MOUTH THREE TIMES A DAY AS NEEDED FOR MUSCLE SPASM   • metoprolol tartrate (LOPRESSOR) 50 MG tablet Take 50 mg by mouth 2 (Two) Times a Day.   • pantoprazole (PROTONIX) 40 MG EC tablet TAKE 1 TABLET BY MOUTH EVERY DAY   • rosuvastatin (CRESTOR) 40 MG tablet Take 1 tablet by mouth Daily. 200001   • Specialty Vitamins Products (HEALTHY HEART PO) Take 400 unit marking on U-100 syringe by mouth Daily.   • Trulicity 1.5 MG/0.5ML solution pen-injector INJECT 1.5 MG UNDER THE SKIN 1 (ONE) TIME PER WEEK.   • [DISCONTINUED] QUEtiapine (SEROquel) 25 MG tablet TAKE 1-2 TABLETS 30 MINUTES BEFORE BEDTIME AS NEEDED FOR SLEEP   • [DISCONTINUED] traZODone (DESYREL) 150 MG tablet TAKE 1 TABLET BY MOUTH EVERYDAY AT BEDTIME     No current facility-administered medications on file prior to visit.       Objective   Vitals:    07/08/22 0807   BP: 132/78   Pulse: 77   SpO2: 97%   Weight: 104 kg (229 lb)   Height: 157.5 cm (62\")     Body mass index is 41.88 kg/m².    Physical Exam  Vitals reviewed.   Constitutional:       Appearance: Normal appearance. She is well-developed.   HENT:      Head: Normocephalic and atraumatic.      Nose: Nose normal.   Eyes:      General: Lids are normal.      Conjunctiva/sclera: Conjunctivae normal.      Pupils: Pupils are equal, round, and reactive to light.   Neck:      Thyroid: No thyromegaly.      Trachea: Trachea normal.   Cardiovascular:      Rate and Rhythm: Normal rate and regular rhythm.      Heart sounds: Normal heart sounds.   Pulmonary:      Effort: Pulmonary effort is normal. No respiratory distress.      Breath sounds: Normal breath sounds.   Skin:     General: Skin is warm and dry.   Neurological:      Mental Status: She is alert and oriented to person, place, and time.      GCS: GCS eye subscore is 4. GCS verbal subscore is 5. GCS motor subscore is 6.   Psychiatric:         Attention and Perception: Attention normal.         Mood and Affect: Mood " normal.         Speech: Speech normal.         Behavior: Behavior normal. Behavior is cooperative.         Thought Content: Thought content normal.         Assessment & Plan   Problem List Items Addressed This Visit        Cardiac and Vasculature    Essential hypertension    Relevant Orders    Comprehensive Metabolic Panel    CBC Auto Differential       Endocrine and Metabolic    Type 2 diabetes mellitus (HCC) - Primary    Relevant Orders    Comprehensive Metabolic Panel    CBC Auto Differential    Hemoglobin A1c       Sleep    Insomnia    Relevant Medications    QUEtiapine (SEROquel) 25 MG tablet    traZODone (DESYREL) 150 MG tablet      Other Visit Diagnoses     Function kidney decreased        Relevant Orders    Comprehensive Metabolic Panel    CBC Auto Differential    Elevated liver enzymes        Relevant Orders    Comprehensive Metabolic Panel    CBC Auto Differential             Current Outpatient Medications:   •  ACCU-CHEK SOFTCLIX LANCETS lancets, USE TO TEST BLOOD GLUCOSE THREE TIMES DAILY AS DIRECTED, Disp: , Rfl: 5  •  Aspirin 81 MG capsule, Take 1 tablet by mouth Daily., Disp: 90 capsule, Rfl: 1  •  B-D UF III MINI PEN NEEDLES 31G X 5 MM misc, Use with insulin twice a day, Disp: 60 each, Rfl: 11  •  B-D ULTRAFINE III SHORT PEN 31G X 8 MM misc, Inject 1 unit marking on U-100 syringe as directed 2 (Two) Times a Day., Disp: 90 each, Rfl: 2  •  Blood Glucose Monitoring Suppl (ACCU-CHEK MARIO PLUS) w/Device kit, use to test blood glucose three times daily, Disp: , Rfl: 5  •  buPROPion XL (WELLBUTRIN XL) 300 MG 24 hr tablet, TAKE 1 TABLET BY MOUTH DAILY. NEEDS APPT FOR FURTHER REFILLS., Disp: 90 tablet, Rfl: 0  •  Cholecalciferol (VITAMIN D-3) 25 MCG (1000 UT) capsule, Take 1,000 Units by mouth Daily., Disp: , Rfl:   •  clopidogrel (PLAVIX) 75 MG tablet, TAKE 1 TABLET BY MOUTH DAILY. PATIENT MUST KEEP APPOINTMENT FOR FURTHER REFILLS., Disp: 90 tablet, Rfl: 1  •  FLUoxetine (PROzac) 10 MG capsule, Take 1  capsule by mouth Daily., Disp: 90 capsule, Rfl: 1  •  glucose blood (OneTouch Verio) test strip, TEST THREE TIMES A DAY E11.9, Disp: 100 each, Rfl: 1  •  Insulin Lispro Prot & Lispro (HumaLOG Mix 75/25 KwikPen) (75-25) 100 UNIT/ML suspension pen-injector pen, Inject 20 Units under the skin into the appropriate area as directed 2 (Two) Times a Day., Disp: 5 pen, Rfl: 3  •  isosorbide mononitrate (IMDUR) 60 MG 24 hr tablet, Take 1 tablet by mouth Every Morning., Disp: 90 tablet, Rfl: 2  •  lisinopril (PRINIVIL,ZESTRIL) 2.5 MG tablet, Take 1 tablet by mouth Daily., Disp: 30 tablet, Rfl: 5  •  losartan (COZAAR) 25 MG tablet, Take 25 mg by mouth Daily., Disp: , Rfl:   •  methocarbamol (ROBAXIN) 500 MG tablet, TAKE 1 TABLET BY MOUTH THREE TIMES A DAY AS NEEDED FOR MUSCLE SPASM, Disp: 90 tablet, Rfl: 2  •  metoprolol tartrate (LOPRESSOR) 50 MG tablet, Take 50 mg by mouth 2 (Two) Times a Day., Disp: , Rfl:   •  pantoprazole (PROTONIX) 40 MG EC tablet, TAKE 1 TABLET BY MOUTH EVERY DAY, Disp: 90 tablet, Rfl: 0  •  QUEtiapine (SEROquel) 25 MG tablet, Take 1 tablet by mouth Every Night., Disp: 90 tablet, Rfl: 1  •  rosuvastatin (CRESTOR) 40 MG tablet, Take 1 tablet by mouth Daily. 200001, Disp: 30 tablet, Rfl: 5  •  Specialty Vitamins Products (HEALTHY HEART PO), Take 400 unit marking on U-100 syringe by mouth Daily., Disp: , Rfl:   •  traZODone (DESYREL) 150 MG tablet, Take 1 tablet by mouth every night at bedtime., Disp: 90 tablet, Rfl: 1  •  Trulicity 1.5 MG/0.5ML solution pen-injector, INJECT 1.5 MG UNDER THE SKIN 1 (ONE) TIME PER WEEK., Disp: , Rfl:      1. Type 2 diabetes, chronic, stable  Continue Trulicity and Humalog mix. Continue follow-ups with endocrinology.    2. Hypertension, chronic, stable.    Continue losartan, lisinopril, and metoprolol.    3. Insomnia, chronic, stable  Continue trazodone, and Seroquel.     4. Decreased kidney function, chronic, unstable.  Repeating CMP today. Will drink adequate amount of  water. We will closely monitor.    5. Elevated liver enzymes, new unstable.   Completing CMP today. Discussed eating heart-healthy diet and refraining from processed foods.    Plan of care reviewed with the patient at the conclusion of today's visit.  Education was provided regarding diagnosis, management, and any prescribed or recommended OTC medications.  Patient verbalized understanding of and agreement with management plan.     Return in about 3 months (around 10/8/2022), or if symptoms worsen or fail to improve.      Transcribed from ambient dictation for ROCCO Monroy by ROCCO Monroy.  07/08/22   08:22 EDT    Patient verbalized consent to the visit recording.     Transcribed from ambient dictation for ROCCO Monroy by Ashanti Yousif.  07/08/22   10:40 EDT    Patient verbalized consent to the visit recording.

## 2022-09-15 DIAGNOSIS — G47.09 OTHER INSOMNIA: ICD-10-CM

## 2022-09-15 RX ORDER — QUETIAPINE FUMARATE 25 MG/1
TABLET, FILM COATED ORAL
Qty: 45 TABLET | Refills: 1 | Status: SHIPPED | OUTPATIENT
Start: 2022-09-15 | End: 2022-12-02

## 2022-09-20 DIAGNOSIS — M62.838 MUSCLE SPASM: ICD-10-CM

## 2022-09-20 RX ORDER — METHOCARBAMOL 500 MG/1
TABLET, FILM COATED ORAL
Qty: 90 TABLET | Refills: 2 | Status: SHIPPED | OUTPATIENT
Start: 2022-09-20 | End: 2023-02-24

## 2022-10-12 DIAGNOSIS — F32.0 CURRENT MILD EPISODE OF MAJOR DEPRESSIVE DISORDER WITHOUT PRIOR EPISODE: ICD-10-CM

## 2022-10-12 RX ORDER — BUPROPION HYDROCHLORIDE 300 MG/1
300 TABLET ORAL DAILY
Qty: 90 TABLET | Refills: 0 | Status: SHIPPED | OUTPATIENT
Start: 2022-10-12 | End: 2023-01-03

## 2022-10-13 ENCOUNTER — TRANSCRIBE ORDERS (OUTPATIENT)
Dept: ADMINISTRATIVE | Facility: HOSPITAL | Age: 62
End: 2022-10-13

## 2022-10-13 DIAGNOSIS — Z12.31 VISIT FOR SCREENING MAMMOGRAM: Primary | ICD-10-CM

## 2022-10-14 ENCOUNTER — OFFICE VISIT (OUTPATIENT)
Dept: INTERNAL MEDICINE | Facility: CLINIC | Age: 62
End: 2022-10-14

## 2022-10-14 VITALS
HEART RATE: 73 BPM | HEIGHT: 62 IN | WEIGHT: 223 LBS | DIASTOLIC BLOOD PRESSURE: 80 MMHG | TEMPERATURE: 97.7 F | OXYGEN SATURATION: 94 % | BODY MASS INDEX: 41.04 KG/M2 | RESPIRATION RATE: 16 BRPM | SYSTOLIC BLOOD PRESSURE: 138 MMHG

## 2022-10-14 DIAGNOSIS — N18.32 STAGE 3B CHRONIC KIDNEY DISEASE: ICD-10-CM

## 2022-10-14 DIAGNOSIS — Z79.4 TYPE 2 DIABETES MELLITUS WITH OTHER SPECIFIED COMPLICATION, WITH LONG-TERM CURRENT USE OF INSULIN: ICD-10-CM

## 2022-10-14 DIAGNOSIS — I10 ESSENTIAL HYPERTENSION: ICD-10-CM

## 2022-10-14 DIAGNOSIS — E11.69 TYPE 2 DIABETES MELLITUS WITH OTHER SPECIFIED COMPLICATION, WITH LONG-TERM CURRENT USE OF INSULIN: ICD-10-CM

## 2022-10-14 DIAGNOSIS — F41.8 DEPRESSION WITH ANXIETY: Primary | ICD-10-CM

## 2022-10-14 DIAGNOSIS — Z23 NEED FOR IMMUNIZATION AGAINST INFLUENZA: ICD-10-CM

## 2022-10-14 PROCEDURE — G0008 ADMIN INFLUENZA VIRUS VAC: HCPCS | Performed by: NURSE PRACTITIONER

## 2022-10-14 PROCEDURE — 99214 OFFICE O/P EST MOD 30 MIN: CPT | Performed by: NURSE PRACTITIONER

## 2022-10-14 PROCEDURE — 90686 IIV4 VACC NO PRSV 0.5 ML IM: CPT | Performed by: NURSE PRACTITIONER

## 2022-10-14 RX ORDER — FLUOXETINE HYDROCHLORIDE 20 MG/1
20 CAPSULE ORAL DAILY
Qty: 90 CAPSULE | Refills: 1 | Status: SHIPPED | OUTPATIENT
Start: 2022-10-14 | End: 2023-02-10 | Stop reason: SDUPTHER

## 2022-10-14 NOTE — PROGRESS NOTES
Subjective   Chief Complaint   Patient presents with   • Diabetes      Jayna Ambrocio is a 62 y.o. female.   HPI  The patient is here today for follow-up on diabetes.    Diabetes  The patient reports that her A1c was 5.8 in 07/2022. She follows with endocrinology at .    Depression and anxiety  The patient reports that her depression and anxiety are not well. She states that she lost her sister. She agrees that she is currently taking 10 mg of fluoxetine. She is also taking 1 tablet of Seroquel in the morning.    Insomnia  The patient reports that she is sleeping well with the trazodone. She states that she would like to stay on the trazodone.    Hypertension  The patient states she is currently taking lisinopril 2.5 mg and metoprolol twice daily.    Kidney function  The patient states that she does not see a nephrologist.    Social history  The patient reports that she quit smoking 9 years ago.  I have reviewed the following portions of the patient's history and confirmed they are accurate: allergies, current medications, past family history, past medical history, past social history, past surgical history, and problem list    I have personally completed the patient's review of systems.    Review of Systems   Constitutional: Negative for activity change, appetite change, chills, diaphoresis, fatigue, fever, unexpected weight gain and unexpected weight loss.   HENT: Negative for congestion, ear discharge, ear pain, mouth sores, nosebleeds, postnasal drip, sinus pressure, sneezing, sore throat, trouble swallowing and voice change.    Eyes: Negative for blurred vision, pain, discharge, itching and visual disturbance.   Respiratory: Negative for cough, chest tightness, shortness of breath and wheezing.    Cardiovascular: Negative for chest pain, palpitations and leg swelling.   Gastrointestinal: Negative for abdominal pain, blood in stool, constipation, diarrhea, nausea and vomiting.   Endocrine: Negative for  heat intolerance, polydipsia, polyphagia and polyuria.   Genitourinary: Negative for dysuria, flank pain, frequency, genital sores, hematuria and urgency.   Musculoskeletal: Negative for arthralgias, back pain, gait problem, joint swelling, myalgias, neck pain and neck stiffness.   Skin: Negative for color change, pallor, rash and skin lesions.   Allergic/Immunologic: Negative for environmental allergies, food allergies and immunocompromised state.   Neurological: Negative for dizziness, seizures, speech difficulty, weakness, numbness, headache, memory problem and confusion.   Hematological: Negative for adenopathy.   Psychiatric/Behavioral: Positive for depressed mood. Negative for agitation, decreased concentration, self-injury, sleep disturbance and suicidal ideas. The patient is nervous/anxious.        Current Outpatient Medications on File Prior to Visit   Medication Sig   • ACCU-CHEK SOFTCLIX LANCETS lancets USE TO TEST BLOOD GLUCOSE THREE TIMES DAILY AS DIRECTED   • Aspirin 81 MG capsule Take 1 tablet by mouth Daily.   • B-D UF III MINI PEN NEEDLES 31G X 5 MM misc Use with insulin twice a day   • B-D ULTRAFINE III SHORT PEN 31G X 8 MM misc Inject 1 unit marking on U-100 syringe as directed 2 (Two) Times a Day.   • Blood Glucose Monitoring Suppl (ACCU-CHEK MARIO PLUS) w/Device kit use to test blood glucose three times daily   • buPROPion XL (WELLBUTRIN XL) 300 MG 24 hr tablet TAKE 1 TABLET BY MOUTH DAILY. NEEDS APPT FOR FURTHER REFILLS.   • Cholecalciferol (VITAMIN D-3) 25 MCG (1000 UT) capsule Take 1,000 Units by mouth Daily.   • clopidogrel (PLAVIX) 75 MG tablet TAKE 1 TABLET BY MOUTH DAILY. PATIENT MUST KEEP APPOINTMENT FOR FURTHER REFILLS.   • glucose blood (OneTouch Verio) test strip TEST THREE TIMES A DAY E11.9   • Insulin Lispro Prot & Lispro (HumaLOG Mix 75/25 KwikPen) (75-25) 100 UNIT/ML suspension pen-injector pen Inject 20 Units under the skin into the appropriate area as directed 2 (Two) Times a  "Day.   • isosorbide mononitrate (IMDUR) 60 MG 24 hr tablet Take 1 tablet by mouth Every Morning.   • lisinopril (PRINIVIL,ZESTRIL) 2.5 MG tablet Take 1 tablet by mouth Daily.   • methocarbamol (ROBAXIN) 500 MG tablet TAKE 1 TABLET BY MOUTH THREE TIMES A DAY AS NEEDED FOR MUSCLE SPASM   • metoprolol tartrate (LOPRESSOR) 50 MG tablet Take 50 mg by mouth 2 (Two) Times a Day.   • pantoprazole (PROTONIX) 40 MG EC tablet TAKE 1 TABLET BY MOUTH EVERY DAY   • QUEtiapine (SEROquel) 25 MG tablet TAKE 1-2 TABLETS 30 MINUTES BEFORE BEDTIME AS NEEDED FOR SLEEP   • rosuvastatin (CRESTOR) 40 MG tablet Take 1 tablet by mouth Daily. 200001   • Specialty Vitamins Products (HEALTHY HEART PO) Take 400 unit marking on U-100 syringe by mouth Daily.   • traZODone (DESYREL) 150 MG tablet Take 1 tablet by mouth every night at bedtime.   • Trulicity 1.5 MG/0.5ML solution pen-injector INJECT 1.5 MG UNDER THE SKIN 1 (ONE) TIME PER WEEK.   • [DISCONTINUED] FLUoxetine (PROzac) 10 MG capsule Take 1 capsule by mouth Daily.   • [DISCONTINUED] losartan (COZAAR) 25 MG tablet Take 25 mg by mouth Daily.     No current facility-administered medications on file prior to visit.       Objective   Vitals:    10/14/22 0907   BP: 138/80   Pulse: 73   Resp: 16   Temp: 97.7 °F (36.5 °C)   TempSrc: Tympanic   SpO2: 94%   Weight: 101 kg (223 lb)   Height: 157.5 cm (62\")     Body mass index is 40.79 kg/m².    Physical Exam  Vitals reviewed.   Constitutional:       Appearance: Normal appearance. She is well-developed.   HENT:      Head: Normocephalic and atraumatic.      Nose: Nose normal.   Eyes:      General: Lids are normal.      Conjunctiva/sclera: Conjunctivae normal.      Pupils: Pupils are equal, round, and reactive to light.   Neck:      Thyroid: No thyromegaly.      Trachea: Trachea normal.   Cardiovascular:      Rate and Rhythm: Normal rate and regular rhythm.      Heart sounds: Normal heart sounds.   Pulmonary:      Effort: Pulmonary effort is normal. No " respiratory distress.      Breath sounds: Normal breath sounds.   Skin:     General: Skin is warm and dry.   Neurological:      Mental Status: She is alert and oriented to person, place, and time.      GCS: GCS eye subscore is 4. GCS verbal subscore is 5. GCS motor subscore is 6.   Psychiatric:         Attention and Perception: Attention normal.         Mood and Affect: Mood normal.         Speech: Speech normal.         Behavior: Behavior normal. Behavior is cooperative.         Thought Content: Thought content normal.         Assessment & Plan   Problem List Items Addressed This Visit        Cardiac and Vasculature    Essential hypertension       Endocrine and Metabolic    Type 2 diabetes mellitus (HCC)   Other Visit Diagnoses     Depression with anxiety    -  Primary    Relevant Medications    FLUoxetine (PROzac) 20 MG capsule    Stage 3b chronic kidney disease (HCC)        Need for immunization against influenza        Relevant Orders    FluLaval/Fluzone >6 mos ([id number]) (Completed)    Flu vaccine need        Relevant Orders    FluLaval/Fluzone >6 mos ([id number]) (Completed)      PLAN  Depression with anxiety- Chronic, unstable.  I will increase Prozac to 20 mg daily for the patient. The patient will continue Wellbutrin and trazodone. She will also continue Seroquel at night. I discussed with the patient that she may need to increase Prozac to 40 mg in 1 month if there are no improvement with symptoms.    Stage 3b chronic kidney disease, Chronic, unstable.  The patient is being referred to nephrology for consult. Her cholesterol, diabetes, and hypertension are all well controlled. The patient will drink adequate amounts of water and refrain from non-steroidal anti-inflammatory drugs.    Type 2 diabetes- Chronic, stable.  The patient will continue Trulicity and Humalog. She will continue follow-ups with endocrinology.    Hypertension- Chronic, stable.  The patient will continue lisinopril and metoprolol. She  will also continue Plavix.  The patient will continue follow-ups with cardiology.      Current Outpatient Medications:   •  ACCU-CHEK SOFTCLIX LANCETS lancets, USE TO TEST BLOOD GLUCOSE THREE TIMES DAILY AS DIRECTED, Disp: , Rfl: 5  •  Aspirin 81 MG capsule, Take 1 tablet by mouth Daily., Disp: 90 capsule, Rfl: 1  •  B-D UF III MINI PEN NEEDLES 31G X 5 MM misc, Use with insulin twice a day, Disp: 60 each, Rfl: 11  •  B-D ULTRAFINE III SHORT PEN 31G X 8 MM misc, Inject 1 unit marking on U-100 syringe as directed 2 (Two) Times a Day., Disp: 90 each, Rfl: 2  •  Blood Glucose Monitoring Suppl (ACCU-CHEK MARIO PLUS) w/Device kit, use to test blood glucose three times daily, Disp: , Rfl: 5  •  buPROPion XL (WELLBUTRIN XL) 300 MG 24 hr tablet, TAKE 1 TABLET BY MOUTH DAILY. NEEDS APPT FOR FURTHER REFILLS., Disp: 90 tablet, Rfl: 0  •  Cholecalciferol (VITAMIN D-3) 25 MCG (1000 UT) capsule, Take 1,000 Units by mouth Daily., Disp: , Rfl:   •  clopidogrel (PLAVIX) 75 MG tablet, TAKE 1 TABLET BY MOUTH DAILY. PATIENT MUST KEEP APPOINTMENT FOR FURTHER REFILLS., Disp: 90 tablet, Rfl: 1  •  FLUoxetine (PROzac) 20 MG capsule, Take 1 capsule by mouth Daily., Disp: 90 capsule, Rfl: 1  •  glucose blood (OneTouch Verio) test strip, TEST THREE TIMES A DAY E11.9, Disp: 100 each, Rfl: 1  •  Insulin Lispro Prot & Lispro (HumaLOG Mix 75/25 KwikPen) (75-25) 100 UNIT/ML suspension pen-injector pen, Inject 20 Units under the skin into the appropriate area as directed 2 (Two) Times a Day., Disp: 5 pen, Rfl: 3  •  isosorbide mononitrate (IMDUR) 60 MG 24 hr tablet, Take 1 tablet by mouth Every Morning., Disp: 90 tablet, Rfl: 2  •  lisinopril (PRINIVIL,ZESTRIL) 2.5 MG tablet, Take 1 tablet by mouth Daily., Disp: 30 tablet, Rfl: 5  •  methocarbamol (ROBAXIN) 500 MG tablet, TAKE 1 TABLET BY MOUTH THREE TIMES A DAY AS NEEDED FOR MUSCLE SPASM, Disp: 90 tablet, Rfl: 2  •  metoprolol tartrate (LOPRESSOR) 50 MG tablet, Take 50 mg by mouth 2 (Two) Times a  Day., Disp: , Rfl:   •  pantoprazole (PROTONIX) 40 MG EC tablet, TAKE 1 TABLET BY MOUTH EVERY DAY, Disp: 90 tablet, Rfl: 0  •  QUEtiapine (SEROquel) 25 MG tablet, TAKE 1-2 TABLETS 30 MINUTES BEFORE BEDTIME AS NEEDED FOR SLEEP, Disp: 45 tablet, Rfl: 1  •  rosuvastatin (CRESTOR) 40 MG tablet, Take 1 tablet by mouth Daily. 200001, Disp: 30 tablet, Rfl: 5  •  Specialty Vitamins Products (HEALTHY HEART PO), Take 400 unit marking on U-100 syringe by mouth Daily., Disp: , Rfl:   •  traZODone (DESYREL) 150 MG tablet, Take 1 tablet by mouth every night at bedtime., Disp: 90 tablet, Rfl: 1  •  Trulicity 1.5 MG/0.5ML solution pen-injector, INJECT 1.5 MG UNDER THE SKIN 1 (ONE) TIME PER WEEK., Disp: , Rfl:        Plan of care reviewed with the patient at the conclusion of today's visit.  Education was provided regarding diagnosis, management, and any prescribed or recommended OTC medications.  Patient verbalized understanding of and agreement with management plan.     Return in about 3 months (around 1/14/2023), or if symptoms worsen or fail to improve.    Transcribed from ambient dictation for ROCCO Monroy by Celine Allen.  10/14/22   10:40 EDT    Patient or patient representative verbalized consent to the visit recording.  I have personally performed the services described in this document as transcribed by the above individual, and it is both accurate and complete.  Celine Allen

## 2022-11-22 ENCOUNTER — HOSPITAL ENCOUNTER (OUTPATIENT)
Dept: MAMMOGRAPHY | Facility: HOSPITAL | Age: 62
Discharge: HOME | End: 2022-11-22
Admitting: NURSE PRACTITIONER

## 2022-11-22 DIAGNOSIS — Z12.31 VISIT FOR SCREENING MAMMOGRAM: ICD-10-CM

## 2022-11-22 PROCEDURE — 77067 SCR MAMMO BI INCL CAD: CPT

## 2022-11-22 PROCEDURE — 77063 BREAST TOMOSYNTHESIS BI: CPT | Performed by: RADIOLOGY

## 2022-11-22 PROCEDURE — 77067 SCR MAMMO BI INCL CAD: CPT | Performed by: RADIOLOGY

## 2022-11-22 PROCEDURE — 77063 BREAST TOMOSYNTHESIS BI: CPT

## 2022-12-02 DIAGNOSIS — G47.09 OTHER INSOMNIA: ICD-10-CM

## 2022-12-02 RX ORDER — QUETIAPINE FUMARATE 25 MG/1
TABLET, FILM COATED ORAL
Qty: 45 TABLET | Refills: 1 | Status: SHIPPED | OUTPATIENT
Start: 2022-12-02 | End: 2023-01-25

## 2022-12-29 DIAGNOSIS — G47.09 OTHER INSOMNIA: ICD-10-CM

## 2022-12-29 RX ORDER — TRAZODONE HYDROCHLORIDE 150 MG/1
TABLET ORAL
Qty: 90 TABLET | Refills: 1 | Status: SHIPPED | OUTPATIENT
Start: 2022-12-29 | End: 2023-02-10

## 2023-01-03 DIAGNOSIS — F32.0 CURRENT MILD EPISODE OF MAJOR DEPRESSIVE DISORDER WITHOUT PRIOR EPISODE: ICD-10-CM

## 2023-01-03 RX ORDER — BUPROPION HYDROCHLORIDE 300 MG/1
300 TABLET ORAL DAILY
Qty: 90 TABLET | Refills: 0 | Status: SHIPPED | OUTPATIENT
Start: 2023-01-03

## 2023-01-25 DIAGNOSIS — G47.09 OTHER INSOMNIA: ICD-10-CM

## 2023-01-25 RX ORDER — QUETIAPINE FUMARATE 25 MG/1
TABLET, FILM COATED ORAL
Qty: 90 TABLET | Refills: 1 | Status: SHIPPED | OUTPATIENT
Start: 2023-01-25 | End: 2023-02-10

## 2023-02-10 ENCOUNTER — TRANSCRIBE ORDERS (OUTPATIENT)
Dept: LAB | Facility: HOSPITAL | Age: 63
End: 2023-02-10
Payer: MEDICARE

## 2023-02-10 ENCOUNTER — LAB (OUTPATIENT)
Dept: LAB | Facility: HOSPITAL | Age: 63
End: 2023-02-10
Payer: MEDICARE

## 2023-02-10 ENCOUNTER — OFFICE VISIT (OUTPATIENT)
Dept: INTERNAL MEDICINE | Facility: CLINIC | Age: 63
End: 2023-02-10
Payer: MEDICARE

## 2023-02-10 VITALS
BODY MASS INDEX: 41.04 KG/M2 | DIASTOLIC BLOOD PRESSURE: 82 MMHG | HEIGHT: 62 IN | TEMPERATURE: 97.4 F | SYSTOLIC BLOOD PRESSURE: 138 MMHG | HEART RATE: 74 BPM | RESPIRATION RATE: 16 BRPM | OXYGEN SATURATION: 98 % | WEIGHT: 223 LBS

## 2023-02-10 DIAGNOSIS — F41.8 DEPRESSION WITH ANXIETY: ICD-10-CM

## 2023-02-10 DIAGNOSIS — R89.9 ABNORMAL LABORATORY TEST: ICD-10-CM

## 2023-02-10 DIAGNOSIS — I10 ESSENTIAL HYPERTENSION: ICD-10-CM

## 2023-02-10 DIAGNOSIS — E11.9 DIABETES MELLITUS TYPE 2 WITHOUT RETINOPATHY: ICD-10-CM

## 2023-02-10 DIAGNOSIS — E11.65 TYPE 2 DIABETES MELLITUS WITH HYPERGLYCEMIA, WITH LONG-TERM CURRENT USE OF INSULIN: Primary | ICD-10-CM

## 2023-02-10 DIAGNOSIS — Z79.4 TYPE 2 DIABETES MELLITUS WITH HYPERGLYCEMIA, WITH LONG-TERM CURRENT USE OF INSULIN: Primary | ICD-10-CM

## 2023-02-10 DIAGNOSIS — E78.2 MIXED HYPERLIPIDEMIA: ICD-10-CM

## 2023-02-10 DIAGNOSIS — I10 ESSENTIAL HYPERTENSION: Primary | ICD-10-CM

## 2023-02-10 DIAGNOSIS — G47.09 OTHER INSOMNIA: ICD-10-CM

## 2023-02-10 LAB
EXPIRATION DATE: NORMAL
HBA1C MFR BLD: 8.5 %
HBA1C MFR BLD: 8.6 % (ref 4.8–5.6)
Lab: NORMAL

## 2023-02-10 PROCEDURE — 36415 COLL VENOUS BLD VENIPUNCTURE: CPT

## 2023-02-10 PROCEDURE — 83036 HEMOGLOBIN GLYCOSYLATED A1C: CPT

## 2023-02-10 PROCEDURE — 3052F HG A1C>EQUAL 8.0%<EQUAL 9.0%: CPT | Performed by: NURSE PRACTITIONER

## 2023-02-10 PROCEDURE — 99214 OFFICE O/P EST MOD 30 MIN: CPT | Performed by: NURSE PRACTITIONER

## 2023-02-10 PROCEDURE — 83036 HEMOGLOBIN GLYCOSYLATED A1C: CPT | Performed by: NURSE PRACTITIONER

## 2023-02-10 PROCEDURE — 80053 COMPREHEN METABOLIC PANEL: CPT

## 2023-02-10 RX ORDER — QUETIAPINE FUMARATE 50 MG/1
50 TABLET, FILM COATED ORAL NIGHTLY
Qty: 90 TABLET | Refills: 1 | Status: SHIPPED | OUTPATIENT
Start: 2023-02-10

## 2023-02-10 RX ORDER — TRAZODONE HYDROCHLORIDE 100 MG/1
200 TABLET ORAL NIGHTLY
Qty: 180 TABLET | Refills: 1 | Status: SHIPPED | OUTPATIENT
Start: 2023-02-10

## 2023-02-10 RX ORDER — FLUOXETINE HYDROCHLORIDE 40 MG/1
40 CAPSULE ORAL DAILY
Qty: 90 CAPSULE | Refills: 1 | Status: SHIPPED | OUTPATIENT
Start: 2023-02-10

## 2023-02-10 NOTE — PROGRESS NOTES
Subjective   Chief Complaint   Patient presents with   • Diabetes   • Depression             Jayna Ambrocio is a 62 y.o. female.     The patient is here today for diabetes and depression. Her A1c 7 months ago was well controlled at 6.2 percent. This has significantly increased at 8.5 percent today. She had an emergency room visit at [facility] in 07/2022.    Diabetes  The patient states she is doing well. She states she is on different medication and does not like the new medication. She did not receive the new medication for almost 2 weeks, and it set her back. The patient has an appointment with Dr. Aly on 03/17/2023.     Depression with anxiety  The patient states she recently lost her son in 07/2022. She denies sleeping well last night. She admits that some nights she can sleep and some she cannot. She is taking a combination of trazodone and Seroquel for sleep. We will also increase her Seroquel. She admits the Prozac works well. She denies having panic attacks. She denies trying BuSpar in the past.     Hypertension  The patient's blood pressure is stable and at goal today. Her labs show her last lipid panel was normal and at goal and she is taking a statin.    Insomnia  The patient inquiries about increasing her trazodone dosage for sleep.    I have reviewed the following portions of the patient's history and confirmed they are accurate: allergies, current medications, past family history, past medical history, past social history, past surgical history, and problem list    I have personally completed the patient's review of systems.    Review of Systems   Constitutional: Positive for fatigue. Negative for activity change, appetite change, chills, diaphoresis, fever, unexpected weight gain and unexpected weight loss.   HENT: Negative for congestion, ear discharge, ear pain, mouth sores, nosebleeds, postnasal drip, sinus pressure, sneezing, sore throat, trouble swallowing and voice change.     Eyes: Negative for blurred vision, pain, discharge, itching and visual disturbance.   Respiratory: Negative for cough, chest tightness, shortness of breath and wheezing.    Cardiovascular: Negative for chest pain, palpitations and leg swelling.   Gastrointestinal: Negative for abdominal pain, blood in stool, constipation, diarrhea, nausea and vomiting.   Endocrine: Negative for heat intolerance, polydipsia, polyphagia and polyuria.   Genitourinary: Negative for dysuria, flank pain, frequency, genital sores, hematuria and urgency.   Musculoskeletal: Negative for arthralgias, back pain, gait problem, joint swelling, myalgias, neck pain and neck stiffness.   Skin: Negative for color change, pallor, rash and skin lesions.   Allergic/Immunologic: Negative for environmental allergies, food allergies and immunocompromised state.   Neurological: Negative for dizziness, seizures, speech difficulty, weakness, numbness, headache, memory problem and confusion.   Hematological: Negative for adenopathy.   Psychiatric/Behavioral: Positive for sleep disturbance, depressed mood and stress. Negative for agitation, decreased concentration, self-injury and suicidal ideas. The patient is nervous/anxious.        Current Outpatient Medications on File Prior to Visit   Medication Sig   • Aspirin 81 MG capsule Take 1 tablet by mouth Daily.   • buPROPion XL (WELLBUTRIN XL) 300 MG 24 hr tablet TAKE 1 TABLET BY MOUTH DAILY. NEEDS APPT FOR FURTHER REFILLS.   • Cholecalciferol (VITAMIN D-3) 25 MCG (1000 UT) capsule Take 1,000 Units by mouth Daily.   • clopidogrel (PLAVIX) 75 MG tablet TAKE 1 TABLET BY MOUTH DAILY. PATIENT MUST KEEP APPOINTMENT FOR FURTHER REFILLS.   • exenatide er (BYDUREON) 2 MG/0.85ML auto-injector injection Inject 2 mg under the skin into the appropriate area as directed 1 (One) Time Per Week.   • isosorbide mononitrate (IMDUR) 60 MG 24 hr tablet Take 1 tablet by mouth Every Morning.   • lisinopril (PRINIVIL,ZESTRIL) 2.5  "MG tablet Take 1 tablet by mouth Daily.   • metoprolol tartrate (LOPRESSOR) 50 MG tablet Take 50 mg by mouth 2 (Two) Times a Day.   • pantoprazole (PROTONIX) 40 MG EC tablet TAKE 1 TABLET BY MOUTH EVERY DAY   • rosuvastatin (CRESTOR) 40 MG tablet Take 1 tablet by mouth Daily. 200001   • Specialty Vitamins Products (HEALTHY HEART PO) Take 400 unit marking on U-100 syringe by mouth Daily.   • ACCU-CHEK SOFTCLIX LANCETS lancets USE TO TEST BLOOD GLUCOSE THREE TIMES DAILY AS DIRECTED   • B-D UF III MINI PEN NEEDLES 31G X 5 MM misc Use with insulin twice a day   • B-D ULTRAFINE III SHORT PEN 31G X 8 MM misc Inject 1 unit marking on U-100 syringe as directed 2 (Two) Times a Day.   • Blood Glucose Monitoring Suppl (ACCU-CHEK MARIO PLUS) w/Device kit use to test blood glucose three times daily   • glucose blood (OneTouch Verio) test strip TEST THREE TIMES A DAY E11.9   • Insulin Lispro Prot & Lispro (HumaLOG Mix 75/25 KwikPen) (75-25) 100 UNIT/ML suspension pen-injector pen Inject 20 Units under the skin into the appropriate area as directed 2 (Two) Times a Day.   • Trulicity 1.5 MG/0.5ML solution pen-injector INJECT 1.5 MG UNDER THE SKIN 1 (ONE) TIME PER WEEK.     No current facility-administered medications on file prior to visit.       Objective   Vitals:    02/10/23 1012   BP: 138/82   Pulse: 74   Resp: 16   Temp: 97.4 °F (36.3 °C)   TempSrc: Tympanic   SpO2: 98%   Weight: 101 kg (223 lb)   Height: 157.5 cm (62\")     Body mass index is 40.79 kg/m².    Physical Exam  Vitals reviewed.   Constitutional:       Appearance: Normal appearance. She is well-developed.   HENT:      Head: Normocephalic and atraumatic.      Nose: Nose normal.   Eyes:      General: Lids are normal.      Conjunctiva/sclera: Conjunctivae normal.      Pupils: Pupils are equal, round, and reactive to light.   Neck:      Thyroid: No thyromegaly.      Trachea: Trachea normal.   Cardiovascular:      Rate and Rhythm: Normal rate and regular rhythm.      " Heart sounds: Normal heart sounds.   Pulmonary:      Effort: Pulmonary effort is normal. No respiratory distress.      Breath sounds: Normal breath sounds.   Skin:     General: Skin is warm and dry.   Neurological:      Mental Status: She is alert and oriented to person, place, and time.      GCS: GCS eye subscore is 4. GCS verbal subscore is 5. GCS motor subscore is 6.   Psychiatric:         Attention and Perception: Attention normal.         Mood and Affect: Mood is depressed.         Speech: Speech normal.         Behavior: Behavior normal. Behavior is cooperative.         Thought Content: Thought content normal.         Assessment & Plan   Problem List Items Addressed This Visit        Cardiac and Vasculature    Essential hypertension       Endocrine and Metabolic    Type 2 diabetes mellitus (HCC) - Primary    Relevant Medications    exenatide er (BYDUREON) 2 MG/0.85ML auto-injector injection    Other Relevant Orders    POC Glycosylated Hemoglobin (Hb A1C) (Completed)       Sleep    Insomnia    Relevant Medications    traZODone (DESYREL) 100 MG tablet    QUEtiapine (SEROquel) 50 MG tablet   Other Visit Diagnoses     Depression with anxiety        Relevant Medications    FLUoxetine (PROzac) 40 MG capsule    traZODone (DESYREL) 100 MG tablet    QUEtiapine (SEROquel) 50 MG tablet    Mixed hyperlipidemia             PLAN  Type 2 diabetes- Chronic, unstable.  - She has upcoming follow-up with endocrinologist in 03/2023 and we will discuss going back on previous medication regimen to help with blood sugar.  - She will follow diabetic diet.  - Completing CMP today to check kidney function.    Depression with anxiety- Chronic, unstable.  - Increase Prozac to 40 mg daily.  - Increase trazodone to 200 mg nightly and increase Seroquel to 50 mg nightly.  - Continue Wellbutrin 300 mg daily.  - She will follow up if needs further adjustment.    Insomnia- Chronic, unstable.  - Increase trazodone to 200 mg nightly.  - Increase  Seroquel to 50 mg nightly.    Hypertension- Chronic, stable.  - Continue lisinopril, metoprolol and Imdur.  - Follow heart healthy, low cholesterol diet.  - Continue follow-ups with cardiology.    Hyperlipidemia- Chronic, stable.  - Continue Crestor 40 mg daily.  - Follow heart healthy, low-cholesterol diet.      Current Outpatient Medications:   •  Aspirin 81 MG capsule, Take 1 tablet by mouth Daily., Disp: 90 capsule, Rfl: 1  •  buPROPion XL (WELLBUTRIN XL) 300 MG 24 hr tablet, TAKE 1 TABLET BY MOUTH DAILY. NEEDS APPT FOR FURTHER REFILLS., Disp: 90 tablet, Rfl: 0  •  Cholecalciferol (VITAMIN D-3) 25 MCG (1000 UT) capsule, Take 1,000 Units by mouth Daily., Disp: , Rfl:   •  clopidogrel (PLAVIX) 75 MG tablet, TAKE 1 TABLET BY MOUTH DAILY. PATIENT MUST KEEP APPOINTMENT FOR FURTHER REFILLS., Disp: 90 tablet, Rfl: 1  •  exenatide er (BYDUREON) 2 MG/0.85ML auto-injector injection, Inject 2 mg under the skin into the appropriate area as directed 1 (One) Time Per Week., Disp: , Rfl:   •  FLUoxetine (PROzac) 40 MG capsule, Take 1 capsule by mouth Daily., Disp: 90 capsule, Rfl: 1  •  isosorbide mononitrate (IMDUR) 60 MG 24 hr tablet, Take 1 tablet by mouth Every Morning., Disp: 90 tablet, Rfl: 2  •  lisinopril (PRINIVIL,ZESTRIL) 2.5 MG tablet, Take 1 tablet by mouth Daily., Disp: 30 tablet, Rfl: 5  •  methocarbamol (ROBAXIN) 500 MG tablet, TAKE 1 TABLET BY MOUTH THREE TIMES A DAY AS NEEDED FOR MUSCLE SPASM, Disp: 90 tablet, Rfl: 2  •  metoprolol tartrate (LOPRESSOR) 50 MG tablet, Take 50 mg by mouth 2 (Two) Times a Day., Disp: , Rfl:   •  pantoprazole (PROTONIX) 40 MG EC tablet, TAKE 1 TABLET BY MOUTH EVERY DAY, Disp: 90 tablet, Rfl: 0  •  rosuvastatin (CRESTOR) 40 MG tablet, Take 1 tablet by mouth Daily. 200001, Disp: 30 tablet, Rfl: 5  •  Specialty Vitamins Products (HEALTHY HEART PO), Take 400 unit marking on U-100 syringe by mouth Daily., Disp: , Rfl:   •  ACCU-CHEK SOFTCLIX LANCETS lancets, USE TO TEST BLOOD GLUCOSE  THREE TIMES DAILY AS DIRECTED, Disp: , Rfl: 5  •  B-D UF III MINI PEN NEEDLES 31G X 5 MM misc, Use with insulin twice a day, Disp: 60 each, Rfl: 11  •  B-D ULTRAFINE III SHORT PEN 31G X 8 MM misc, Inject 1 unit marking on U-100 syringe as directed 2 (Two) Times a Day., Disp: 90 each, Rfl: 2  •  Blood Glucose Monitoring Suppl (ACCU-CHEK MARIO PLUS) w/Device kit, use to test blood glucose three times daily, Disp: , Rfl: 5  •  glucose blood (OneTouch Verio) test strip, TEST THREE TIMES A DAY E11.9, Disp: 100 each, Rfl: 1  •  Insulin Lispro Prot & Lispro (HumaLOG Mix 75/25 KwikPen) (75-25) 100 UNIT/ML suspension pen-injector pen, Inject 20 Units under the skin into the appropriate area as directed 2 (Two) Times a Day., Disp: 5 pen, Rfl: 3  •  QUEtiapine (SEROquel) 50 MG tablet, Take 1 tablet by mouth Every Night., Disp: 90 tablet, Rfl: 1  •  traZODone (DESYREL) 100 MG tablet, Take 2 tablets by mouth Every Night., Disp: 180 tablet, Rfl: 1  •  Trulicity 1.5 MG/0.5ML solution pen-injector, INJECT 1.5 MG UNDER THE SKIN 1 (ONE) TIME PER WEEK., Disp: , Rfl:        Plan of care reviewed with the patient at the conclusion of today's visit.  Education was provided regarding diagnosis, management, and any prescribed or recommended OTC medications.  Patient verbalized understanding of and agreement with management plan.     Return in about 3 months (around 5/10/2023), or if symptoms worsen or fail to improve.      Transcribed from ambient dictation for ROCCO Monroy by Celine Allen.  02/10/23   12:34 EST    Patient or patient representative verbalized consent to the visit recording.  I have personally performed the services described in this document as transcribed by the above individual, and it is both accurate and complete.  Celine Allen

## 2023-02-11 LAB
ALBUMIN SERPL-MCNC: 4.2 G/DL (ref 3.5–5.2)
ALBUMIN/GLOB SERPL: 1.5 G/DL
ALP SERPL-CCNC: 59 U/L (ref 39–117)
ALT SERPL W P-5'-P-CCNC: 31 U/L (ref 1–33)
ANION GAP SERPL CALCULATED.3IONS-SCNC: 9.1 MMOL/L (ref 5–15)
AST SERPL-CCNC: 31 U/L (ref 1–32)
BILIRUB SERPL-MCNC: 0.3 MG/DL (ref 0–1.2)
BUN SERPL-MCNC: 10 MG/DL (ref 8–23)
BUN/CREAT SERPL: 8.7 (ref 7–25)
CALCIUM SPEC-SCNC: 9.6 MG/DL (ref 8.6–10.5)
CHLORIDE SERPL-SCNC: 102 MMOL/L (ref 98–107)
CO2 SERPL-SCNC: 29.9 MMOL/L (ref 22–29)
CREAT SERPL-MCNC: 1.15 MG/DL (ref 0.57–1)
EGFRCR SERPLBLD CKD-EPI 2021: 53.6 ML/MIN/1.73
GLOBULIN UR ELPH-MCNC: 2.8 GM/DL
GLUCOSE SERPL-MCNC: 92 MG/DL (ref 65–99)
POTASSIUM SERPL-SCNC: 4.2 MMOL/L (ref 3.5–5.2)
PROT SERPL-MCNC: 7 G/DL (ref 6–8.5)
SODIUM SERPL-SCNC: 141 MMOL/L (ref 136–145)

## 2023-02-24 DIAGNOSIS — M62.838 MUSCLE SPASM: ICD-10-CM

## 2023-02-24 RX ORDER — METHOCARBAMOL 500 MG/1
TABLET, FILM COATED ORAL
Qty: 90 TABLET | Refills: 2 | Status: SHIPPED | OUTPATIENT
Start: 2023-02-24

## 2023-02-24 NOTE — TELEPHONE ENCOUNTER
Rx Refill Note  Requested Prescriptions     Pending Prescriptions Disp Refills   • methocarbamol (ROBAXIN) 500 MG tablet [Pharmacy Med Name: METHOCARBAMOL 500 MG TABLET] 90 tablet 2     Sig: TAKE 1 TABLET BY MOUTH THREE TIMES A DAY AS NEEDED FOR MUSCLE SPASM      Last office visit with prescribing clinician: 2/10/2023   Last telemedicine visit with prescribing clinician: 5/5/2023   Next office visit with prescribing clinician: 5/5/2023                         Andrey Dimas MA  02/24/23, 14:26 EST

## 2023-05-01 ENCOUNTER — TELEPHONE (OUTPATIENT)
Dept: INTERNAL MEDICINE | Facility: CLINIC | Age: 63
End: 2023-05-01
Payer: MEDICARE

## 2023-05-01 NOTE — TELEPHONE ENCOUNTER
Provider: ROCCO SOUZA    Caller: TRICIA BURTON    Phone Number: [phone]    Reason for Call: PATIENT HAS A MWV THIS Friday AND ALSO A 3 MONTH FOLLOW UP APPOINTMENT SCHEDULED FOR 5/10/23. PATIENT WOULD LIKE TO KNOW IF SHE CAN COMBINE THE FOLLOW UP APPOINTMENT WITH HER [facility] TO AVOID COMING BACK TO BACK

## 2023-05-24 ENCOUNTER — LAB (OUTPATIENT)
Dept: LAB | Facility: HOSPITAL | Age: 63
End: 2023-05-24
Payer: MEDICARE

## 2023-05-24 ENCOUNTER — OFFICE VISIT (OUTPATIENT)
Dept: INTERNAL MEDICINE | Facility: CLINIC | Age: 63
End: 2023-05-24
Payer: MEDICARE

## 2023-05-24 VITALS
HEIGHT: 62 IN | OXYGEN SATURATION: 95 % | BODY MASS INDEX: 41.59 KG/M2 | DIASTOLIC BLOOD PRESSURE: 80 MMHG | HEART RATE: 64 BPM | WEIGHT: 226 LBS | RESPIRATION RATE: 16 BRPM | SYSTOLIC BLOOD PRESSURE: 136 MMHG | TEMPERATURE: 97.8 F

## 2023-05-24 DIAGNOSIS — Z13.220 LIPID SCREENING: ICD-10-CM

## 2023-05-24 DIAGNOSIS — I10 ESSENTIAL HYPERTENSION: ICD-10-CM

## 2023-05-24 DIAGNOSIS — E55.9 VITAMIN D DEFICIENCY: ICD-10-CM

## 2023-05-24 DIAGNOSIS — Z13.21 ENCOUNTER FOR VITAMIN DEFICIENCY SCREENING: ICD-10-CM

## 2023-05-24 DIAGNOSIS — Z00.00 MEDICARE ANNUAL WELLNESS VISIT, SUBSEQUENT: Primary | ICD-10-CM

## 2023-05-24 DIAGNOSIS — G47.09 OTHER INSOMNIA: ICD-10-CM

## 2023-05-24 DIAGNOSIS — E11.65 TYPE 2 DIABETES MELLITUS WITH HYPERGLYCEMIA, WITH LONG-TERM CURRENT USE OF INSULIN: ICD-10-CM

## 2023-05-24 DIAGNOSIS — Z79.4 TYPE 2 DIABETES MELLITUS WITH HYPERGLYCEMIA, WITH LONG-TERM CURRENT USE OF INSULIN: ICD-10-CM

## 2023-05-24 DIAGNOSIS — E66.01 MORBID (SEVERE) OBESITY DUE TO EXCESS CALORIES: ICD-10-CM

## 2023-05-24 DIAGNOSIS — Z13.0 SCREENING FOR BLOOD DISEASE: ICD-10-CM

## 2023-05-24 DIAGNOSIS — E78.2 MIXED HYPERLIPIDEMIA: ICD-10-CM

## 2023-05-24 DIAGNOSIS — Z13.29 THYROID DISORDER SCREENING: ICD-10-CM

## 2023-05-24 LAB
25(OH)D3 SERPL-MCNC: 47.9 NG/ML (ref 30–100)
ALBUMIN SERPL-MCNC: 4.3 G/DL (ref 3.5–5.2)
ALBUMIN UR-MCNC: <1.2 MG/DL
ALBUMIN/GLOB SERPL: 1.6 G/DL
ALP SERPL-CCNC: 54 U/L (ref 39–117)
ALT SERPL W P-5'-P-CCNC: 20 U/L (ref 1–33)
ANION GAP SERPL CALCULATED.3IONS-SCNC: 9.8 MMOL/L (ref 5–15)
AST SERPL-CCNC: 19 U/L (ref 1–32)
BILIRUB SERPL-MCNC: 0.3 MG/DL (ref 0–1.2)
BUN SERPL-MCNC: 14 MG/DL (ref 8–23)
BUN/CREAT SERPL: 12.3 (ref 7–25)
CALCIUM SPEC-SCNC: 11.1 MG/DL (ref 8.6–10.5)
CHLORIDE SERPL-SCNC: 106 MMOL/L (ref 98–107)
CHOLEST SERPL-MCNC: 113 MG/DL (ref 0–200)
CO2 SERPL-SCNC: 27.2 MMOL/L (ref 22–29)
CREAT SERPL-MCNC: 1.14 MG/DL (ref 0.57–1)
CREAT UR-MCNC: 99.4 MG/DL
DEPRECATED RDW RBC AUTO: 42.1 FL (ref 37–54)
EGFRCR SERPLBLD CKD-EPI 2021: 54.2 ML/MIN/1.73
ERYTHROCYTE [DISTWIDTH] IN BLOOD BY AUTOMATED COUNT: 12.8 % (ref 12.3–15.4)
FOLATE SERPL-MCNC: >20 NG/ML (ref 4.78–24.2)
GLOBULIN UR ELPH-MCNC: 2.7 GM/DL
GLUCOSE SERPL-MCNC: 47 MG/DL (ref 65–99)
HBA1C MFR BLD: 8.1 %
HCT VFR BLD AUTO: 42 % (ref 34–46.6)
HDLC SERPL-MCNC: 54 MG/DL (ref 40–60)
HGB BLD-MCNC: 13.9 G/DL (ref 12–15.9)
LDLC SERPL CALC-MCNC: 39 MG/DL (ref 0–100)
LDLC/HDLC SERPL: 0.69 {RATIO}
MCH RBC QN AUTO: 29.3 PG (ref 26.6–33)
MCHC RBC AUTO-ENTMCNC: 33.1 G/DL (ref 31.5–35.7)
MCV RBC AUTO: 88.6 FL (ref 79–97)
MICROALBUMIN/CREAT UR: NORMAL MG/G{CREAT}
PLATELET # BLD AUTO: 278 10*3/MM3 (ref 140–450)
PMV BLD AUTO: 10.8 FL (ref 6–12)
POTASSIUM SERPL-SCNC: 4.3 MMOL/L (ref 3.5–5.2)
PROT SERPL-MCNC: 7 G/DL (ref 6–8.5)
RBC # BLD AUTO: 4.74 10*6/MM3 (ref 3.77–5.28)
SODIUM SERPL-SCNC: 143 MMOL/L (ref 136–145)
TRIGL SERPL-MCNC: 109 MG/DL (ref 0–150)
TSH SERPL DL<=0.05 MIU/L-ACNC: 3.38 UIU/ML (ref 0.27–4.2)
VIT B12 BLD-MCNC: 366 PG/ML (ref 211–946)
VLDLC SERPL-MCNC: 20 MG/DL (ref 5–40)
WBC NRBC COR # BLD: 7.88 10*3/MM3 (ref 3.4–10.8)

## 2023-05-24 PROCEDURE — 82043 UR ALBUMIN QUANTITATIVE: CPT | Performed by: NURSE PRACTITIONER

## 2023-05-24 PROCEDURE — 82570 ASSAY OF URINE CREATININE: CPT | Performed by: NURSE PRACTITIONER

## 2023-05-24 PROCEDURE — 80061 LIPID PANEL: CPT | Performed by: NURSE PRACTITIONER

## 2023-05-24 PROCEDURE — 82746 ASSAY OF FOLIC ACID SERUM: CPT | Performed by: NURSE PRACTITIONER

## 2023-05-24 PROCEDURE — 80053 COMPREHEN METABOLIC PANEL: CPT | Performed by: NURSE PRACTITIONER

## 2023-05-24 PROCEDURE — 82607 VITAMIN B-12: CPT | Performed by: NURSE PRACTITIONER

## 2023-05-24 PROCEDURE — 85027 COMPLETE CBC AUTOMATED: CPT | Performed by: NURSE PRACTITIONER

## 2023-05-24 PROCEDURE — 84443 ASSAY THYROID STIM HORMONE: CPT | Performed by: NURSE PRACTITIONER

## 2023-05-24 PROCEDURE — 82306 VITAMIN D 25 HYDROXY: CPT | Performed by: NURSE PRACTITIONER

## 2023-05-24 RX ORDER — QUETIAPINE FUMARATE 25 MG/1
25 TABLET, FILM COATED ORAL
COMMUNITY
Start: 2023-04-25 | End: 2023-05-24

## 2023-05-24 RX ORDER — TRAZODONE HYDROCHLORIDE 150 MG/1
150 TABLET ORAL
COMMUNITY
Start: 2023-03-26

## 2023-05-24 RX ORDER — QUETIAPINE FUMARATE 50 MG/1
50 TABLET, FILM COATED ORAL NIGHTLY
Qty: 90 TABLET | Refills: 1 | Status: SHIPPED | OUTPATIENT
Start: 2023-05-24

## 2023-05-24 NOTE — PROGRESS NOTES
The ABCs of the Annual Wellness Visit  Subsequent Medicare Wellness Visit    Chief Complaint   Patient presents with   • Medicare Wellness-subsequent       Subjective   History of Present Illness:  Jayna Ambrocio is a 62 y.o. female who presents for a Subsequent Medicare Wellness Visit.    She considers herself healthy and frequently ambulates during the course of the day.  She has good appetite and eats a balanced diet. She regularly takes her multivitamins supplement and denies use of  alcohol or tobacco. Her last Pap smear was less than 5 years ago and mammogram in 2022. She has regular bowel movements and underwent colonoscopy last year. She needs to schedule her ophthalmology appointment. In comparison with last year, she reports her mental and physical health is worse. She does not have an advanced directive on file.    She is complaining of uncontrolled blood glucose levels. Her endocrinologist switched her to Bydureon 2 mg once weekly injection due to Trulicity shortage. She has been unable to get her Bydureon prescription filled leading to poor diabetic control.    She has history of hyperlipidemia and is complaint with her medication.    The patient has insomnia and is reporting waking up in the middle of the night. She is requesting dose modification to improve her sleep. She is taking Seroquel and trazodone regularly but experiences excessive sleepiness even after she has slept well at night. She denies undergoing a sleep study in the past.    She denies any memory loss and is oriented in time, place, and person. Her mother had history of Alzheimer's disease.    She has history of depression and anxiety with occasional episodes.       Overall healthy: Yes, but blood sugar is up due to shortage of GLP-1  Regular exercise:  Yes  Diet is well balanced:  Yes  Vitamin Supplement:  Yes  Alcohol intake:  No   Tobacco use:  No    Cardiovascular risk is low:  Will determine after labs result   Menstrual  "cycle regular:  N/A  PAP:  Up to date  Mammogram:  up to date  Last colon screening:  up to date  Regular dental exam:  Yes   Regular eye exam:  Yes  Immunizations up to date:  Yes  Wear a seatbelt regularly:  Yes         HEALTH RISK ASSESSMENT    Recent Hospitalizations:  Recently treated at the following:  Other: , for syncope/collapse after death of son    Current Medical Providers:  Patient Care Team:  Sidra Cobos APRN as PCP - General (Nurse Practitioner)  Devin Leone MD as Consulting Physician (Gastroenterology)    Smoking Status:  Social History     Tobacco Use   Smoking Status Former   • Packs/day: 0.50   • Years: 35.00   • Pack years: 17.50   • Types: Cigarettes   • Start date:    • Quit date:    • Years since quittin.4   Smokeless Tobacco Never   Tobacco Comments    \"1 pack could last 2 weeks\"       Alcohol Consumption:  Social History     Substance and Sexual Activity   Alcohol Use No       Depression Screen:       2023     9:33 AM   PHQ-2/PHQ-9 Depression Screening   Little Interest or Pleasure in Doing Things 1-->several days   Feeling Down, Depressed or Hopeless 1-->several days   Trouble Falling or Staying Asleep, or Sleeping Too Much 1-->several days   Feeling Tired or Having Little Energy 1-->several days   Poor Appetite or Overeating 0-->not at all   Feeling Bad about Yourself - or that You are a Failure or Have Let Yourself or Your Family Down 0-->not at all   Trouble Concentrating on Things, Such as Reading the Newspaper or Watching Television 0-->not at all   Moving or Speaking So Slowly that Other People Could Have Noticed? Or the Opposite - Being So Fidgety 0-->not at all   Thoughts that You Would be Better Off Dead or of Hurting Yourself in Some Way 0-->not at all   PHQ-9: Brief Depression Severity Measure Score 4   If You Checked Off Any Problems, How Difficult Have These Problems Made It For You to Do Your Work, Take Care of Things at Home, or Get " Along with Other People? somewhat difficult       Fall Risk Screen:  NATALY Fall Risk Assessment was completed, and patient is at MODERATE risk for falls. Assessment completed on:2023    Health Habits and Functional and Cognitive Screenin/24/2023     9:31 AM   Functional & Cognitive Status   Do you have difficulty preparing food and eating? No   Do you have difficulty bathing yourself, getting dressed or grooming yourself? No   Do you have difficulty using the toilet? No   Do you have difficulty moving around from place to place? No   Do you have trouble with steps or getting out of a bed or a chair? Yes   Current Diet Limited Junk Food   Dental Exam Up to date   Eye Exam Up to date   Exercise (times per week) 3 times per week   Current Exercises Include Walking   Do you need help using the phone?  No   Are you deaf or do you have serious difficulty hearing?  No   Do you need help with transportation? Yes   Do you need help shopping? No   Do you need help preparing meals?  Yes   Do you need help with housework?  Yes   Do you need help with laundry? No   Do you need help taking your medications? No   Do you need help managing money? No   Do you ever drive or ride in a car without wearing a seat belt? No   Have you felt unusual stress, anger or loneliness in the last month? No   Who do you live with? Other   If you need help, do you have trouble finding someone available to you? No   Have you been bothered in the last four weeks by sexual problems? No   Do you have difficulty concentrating, remembering or making decisions? Yes         Does the patient have evidence of cognitive impairment? No    Asprin use counseling:Taking ASA appropriately as indicated    Age-appropriate Screening Schedule:  Refer to the list below for future screening recommendations based on patient's age, sex and/or medical conditions. Orders for these recommended tests are listed in the plan section. The patient has been provided  with a written plan.    Health Maintenance   Topic Date Due   • COVID-19 Vaccine (1) Never done   • ZOSTER VACCINE (1 of 2) Never done   • Pneumococcal Vaccine 0-64 (2 - PCV) 02/12/2020   • DIABETIC FOOT EXAM  02/10/2021   • DIABETIC EYE EXAM  03/09/2021   • PAP SMEAR  02/12/2022   • INFLUENZA VACCINE  08/01/2023   • HEMOGLOBIN A1C  11/24/2023   • ANNUAL WELLNESS VISIT  05/24/2024   • LIPID PANEL  05/24/2024   • URINE MICROALBUMIN  05/24/2024   • MAMMOGRAM  11/22/2024   • COLORECTAL CANCER SCREENING  12/01/2024   • HEPATITIS C SCREENING  Completed   • TDAP/TD VACCINES  Discontinued          The following portions of the patient's history were reviewed and updated as appropriate: allergies, current medications, past family history, past medical history, past social history, past surgical history, and problem list.    Outpatient Medications Prior to Visit   Medication Sig Dispense Refill   • ACCU-CHEK SOFTCLIX LANCETS lancets USE TO TEST BLOOD GLUCOSE THREE TIMES DAILY AS DIRECTED  5   • Aspirin 81 MG capsule Take 1 tablet by mouth Daily. 90 capsule 1   • B-D UF III MINI PEN NEEDLES 31G X 5 MM misc Use with insulin twice a day 60 each 11   • B-D ULTRAFINE III SHORT PEN 31G X 8 MM misc Inject 1 unit marking on U-100 syringe as directed 2 (Two) Times a Day. 90 each 2   • Blood Glucose Monitoring Suppl (ACCU-CHEK MARIO PLUS) w/Device kit use to test blood glucose three times daily  5   • buPROPion XL (WELLBUTRIN XL) 300 MG 24 hr tablet TAKE 1 TABLET BY MOUTH DAILY. NEEDS APPT FOR FURTHER REFILLS. 90 tablet 0   • Cholecalciferol (VITAMIN D-3) 25 MCG (1000 UT) capsule Take 1,000 Units by mouth Daily.     • clopidogrel (PLAVIX) 75 MG tablet TAKE 1 TABLET BY MOUTH DAILY. PATIENT MUST KEEP APPOINTMENT FOR FURTHER REFILLS. 90 tablet 1   • exenatide er (BYDUREON) 2 MG/0.85ML auto-injector injection Inject 2 mg under the skin into the appropriate area as directed 1 (One) Time Per Week.     • FLUoxetine (PROzac) 40 MG capsule  Take 1 capsule by mouth Daily. 90 capsule 1   • glucose blood (OneTouch Verio) test strip TEST THREE TIMES A DAY E11.9 100 each 1   • Insulin Lispro Prot & Lispro (HumaLOG Mix 75/25 KwikPen) (75-25) 100 UNIT/ML suspension pen-injector pen Inject 20 Units under the skin into the appropriate area as directed 2 (Two) Times a Day. 5 pen 3   • isosorbide mononitrate (IMDUR) 60 MG 24 hr tablet Take 1 tablet by mouth Every Morning. 90 tablet 2   • lisinopril (PRINIVIL,ZESTRIL) 2.5 MG tablet Take 1 tablet by mouth Daily. 30 tablet 5   • methocarbamol (ROBAXIN) 500 MG tablet TAKE 1 TABLET BY MOUTH THREE TIMES A DAY AS NEEDED FOR MUSCLE SPASM 90 tablet 2   • metoprolol tartrate (LOPRESSOR) 50 MG tablet Take 1 tablet by mouth 2 (Two) Times a Day.     • pantoprazole (PROTONIX) 40 MG EC tablet TAKE 1 TABLET BY MOUTH EVERY DAY 90 tablet 0   • rosuvastatin (CRESTOR) 40 MG tablet Take 1 tablet by mouth Daily. 200001 30 tablet 5   • Specialty Vitamins Products (HEALTHY HEART PO) Take 400 unit marking on U-100 syringe by mouth Daily.     • traZODone (DESYREL) 150 MG tablet Take 1 tablet by mouth every night at bedtime.     • Trulicity 1.5 MG/0.5ML solution pen-injector INJECT 1.5 MG UNDER THE SKIN 1 (ONE) TIME PER WEEK.     • QUEtiapine (SEROquel) 25 MG tablet Take 1 tablet by mouth every night at bedtime.     • QUEtiapine (SEROquel) 50 MG tablet Take 1 tablet by mouth Every Night. 90 tablet 1   • traZODone (DESYREL) 100 MG tablet Take 2 tablets by mouth Every Night. 180 tablet 1     No facility-administered medications prior to visit.       Patient Active Problem List   Diagnosis   • Type 2 diabetes mellitus   • Coronary artery disease   • Essential hypertension   • Acute renal insufficiency   • Postoperative anemia due to acute blood loss   • Insomnia   • Gastroesophageal reflux disease   • Moderate nonproliferative diabetic retinopathy without macular edema associated with type 2 diabetes mellitus   • Class 3 severe obesity due to  excess calories with serious comorbidity and body mass index (BMI) of 40.0 to 44.9 in adult   • S/P CABG (coronary artery bypass graft)   • Coronary artery disease of bypass graft of native heart with stable angina pectoris   • PAD (peripheral artery disease)   • History of atrial fibrillation   • Osteopenia of neck of left femur   • Current mild episode of major depressive disorder without prior episode   • Morbid (severe) obesity due to excess calories       Advanced Care Planning:  ACP discussion was held with the patient during this visit. Patient does not have an advance directive, information provided.    Review of Systems   Constitutional: Positive for fatigue. Negative for activity change, appetite change, chills, diaphoresis, fever, unexpected weight gain and unexpected weight loss.   HENT: Negative for congestion, ear discharge, ear pain, mouth sores, nosebleeds, postnasal drip, sinus pressure, sneezing, sore throat, trouble swallowing and voice change.    Eyes: Negative for blurred vision, pain, discharge, itching and visual disturbance.   Respiratory: Negative for cough, chest tightness, shortness of breath and wheezing.    Cardiovascular: Negative for chest pain, palpitations and leg swelling.   Gastrointestinal: Negative for abdominal pain, blood in stool, constipation, diarrhea, nausea and vomiting.   Endocrine: Negative for heat intolerance, polydipsia, polyphagia and polyuria.   Genitourinary: Negative for dysuria, flank pain, frequency, genital sores, hematuria and urgency.   Musculoskeletal: Negative for arthralgias, back pain, gait problem, joint swelling, myalgias, neck pain and neck stiffness.   Skin: Negative for color change, pallor, rash and skin lesions.   Allergic/Immunologic: Negative for environmental allergies, food allergies and immunocompromised state.   Neurological: Negative for dizziness, seizures, speech difficulty, weakness, numbness, headache, memory problem and confusion.  "  Hematological: Negative for adenopathy.   Psychiatric/Behavioral: Positive for sleep disturbance, depressed mood and stress. Negative for agitation, decreased concentration, self-injury and suicidal ideas. The patient is nervous/anxious.        Compared to one year ago, the patient feels her physical health is worse.  Compared to one year ago, the patient feels her mental health is worse.    Reviewed chart for potential of high risk medication in the elderly: yes  Reviewed chart for potential of harmful drug interactions in the elderly:yes    Objective         Vitals:    05/24/23 0928   BP: 136/80   Pulse: 64   Resp: 16   Temp: 97.8 °F (36.6 °C)   TempSrc: Tympanic   SpO2: 95%   Weight: 103 kg (226 lb)   Height: 157.5 cm (62\")   PainSc:   4   PainLoc: Abdomen       Body mass index is 41.34 kg/m².  Discussed the patient's BMI with her. The BMI is above average; BMI management plan is completed.    Physical Exam  Vitals reviewed.   Constitutional:       Appearance: Normal appearance. She is well-developed.   HENT:      Head: Normocephalic and atraumatic.      Right Ear: Hearing, tympanic membrane, ear canal and external ear normal.      Left Ear: Hearing, tympanic membrane, ear canal and external ear normal.      Nose: Nose normal.      Mouth/Throat:      Lips: Pink.      Mouth: Mucous membranes are moist.      Pharynx: Oropharynx is clear. Uvula midline.   Eyes:      General: Lids are normal.      Extraocular Movements: Extraocular movements intact.      Conjunctiva/sclera: Conjunctivae normal.      Pupils: Pupils are equal, round, and reactive to light.   Neck:      Thyroid: No thyromegaly.      Trachea: Trachea normal.   Cardiovascular:      Rate and Rhythm: Normal rate and regular rhythm.      Pulses:           Carotid pulses are 2+ on the right side and 2+ on the left side.     Heart sounds: Normal heart sounds.   Pulmonary:      Effort: Pulmonary effort is normal. No respiratory distress.      Breath sounds: " Normal breath sounds.   Abdominal:      General: Bowel sounds are normal.      Palpations: Abdomen is soft.      Tenderness: There is no abdominal tenderness.   Skin:     General: Skin is warm and dry.      Capillary Refill: Capillary refill takes 2 to 3 seconds.   Neurological:      Mental Status: She is alert and oriented to person, place, and time.      GCS: GCS eye subscore is 4. GCS verbal subscore is 5. GCS motor subscore is 6.   Psychiatric:         Attention and Perception: Attention normal.         Mood and Affect: Mood normal.         Speech: Speech normal.         Behavior: Behavior normal. Behavior is cooperative.         Thought Content: Thought content normal.         Class 3 Severe Obesity (BMI >=40). Obesity-related health conditions include the following: hypertension, diabetes mellitus, dyslipidemias and osteoarthritis. Obesity is improving with lifestyle modifications. BMI is is above average; BMI management plan is completed. We discussed portion control and increasing exercise.      Lab Results   Component Value Date    TRIG 109 05/24/2023    HDL 54 05/24/2023    LDL 39 05/24/2023    VLDL 20 05/24/2023    HGBA1C 8.1 05/24/2023        Assessment & Plan   Medicare Risks and Personalized Health Plan  CMS Preventative Services Quick Reference  Advance Directive Discussion  Cardiovascular risk  Inactivity/Sedentary  Obesity/Overweight   Osteoporosis Risk    The above risks/problems have been discussed with the patient.  Pertinent information has been shared with the patient in the After Visit Summary.  Follow up plans and orders are seen below in the Assessment/Plan Section.    Diagnoses and all orders for this visit:    1. Medicare annual wellness visit, subsequent (Primary)    2. Other insomnia  -     QUEtiapine (SEROquel) 50 MG tablet; Take 1 tablet by mouth Every Night.  Dispense: 90 tablet; Refill: 1  -     Ambulatory Referral to Sleep Medicine    3. Essential hypertension    4. Mixed  hyperlipidemia    5. Type 2 diabetes mellitus with hyperglycemia, with long-term current use of insulin  -     POCT glycated hemoglobin, total  -     Microalbumin / Creatinine Urine Ratio - Urine, Clean Catch; Future  -     Microalbumin / Creatinine Urine Ratio - Urine, Clean Catch    6. Vitamin D deficiency  -     Vitamin D,25-Hydroxy    7. Screening for blood disease  -     CBC (No Diff)  -     Comprehensive Metabolic Panel  -     Lipid Panel  -     TSH Rfx On Abnormal To Free T4  -     Vitamin B12 & Folate  -     Vitamin D,25-Hydroxy    8. Thyroid disorder screening  -     TSH Rfx On Abnormal To Free T4    9. Lipid screening  -     Lipid Panel    10. Encounter for vitamin deficiency screening  -     Vitamin B12 & Folate  -     Vitamin D,25-Hydroxy    11. Morbid (severe) obesity due to excess calories    1. Medicare annual wellness visit, subsequent  Patient will continue to eat a well-balanced diet, drink adequate amount of water, exercise at least 3 times weekly, and get adequate rest.  Suggested a multivitamin daily.  Discussed future preventative screenings and immunizations.    2. Insomnia.  - Chronic, unstable.  - Referral to sleep medicine for consult.   - Increase Seroquel.  - Continue trazodone.   - Consider different medication if symptoms fail to improve.    3. Hypertension.  - Chronic, stable.  - Continue Imdur, lisinopril, and metoprolol.    4. Hyperlipidemia.  - Chronic, stable.  - Continue Crestor.   - Follow heart-healthy, low-cholesterol diet.   - Completing fasting lipid panel today.    5. Type 2 diabetes mellitus.  - Chronic, unstable.  - Continue Humalog mix.  - Start Bydureon. The patient handed a script for the medication and requested to take it to the pharmacy.  - Continue follow-up with endocrinology.          Follow Up:  Return in about 3 months (around 8/24/2023), or if symptoms worsen or fail to improve.     An After Visit Summary and PPPS were given to the patient.         Transcribed  from ambient dictation for ROCCO Monroy by Jeannette Mark.  05/24/23   12:58 EDT    Patient or patient representative verbalized consent to the visit recording.  I have personally performed the services described in this document as transcribed by the above individual, and it is both accurate and complete.

## 2023-07-22 DIAGNOSIS — G47.09 OTHER INSOMNIA: ICD-10-CM

## 2023-07-24 RX ORDER — QUETIAPINE FUMARATE 25 MG/1
TABLET, FILM COATED ORAL
Qty: 90 TABLET | Refills: 1 | OUTPATIENT
Start: 2023-07-24

## 2023-08-20 DIAGNOSIS — G47.09 OTHER INSOMNIA: ICD-10-CM

## 2023-08-20 DIAGNOSIS — F41.8 DEPRESSION WITH ANXIETY: ICD-10-CM

## 2023-08-21 RX ORDER — TRAZODONE HYDROCHLORIDE 100 MG/1
200 TABLET ORAL NIGHTLY
Qty: 180 TABLET | Refills: 1 | OUTPATIENT
Start: 2023-08-21

## 2023-08-21 RX ORDER — FLUOXETINE HYDROCHLORIDE 40 MG/1
CAPSULE ORAL
Qty: 90 CAPSULE | Refills: 1 | Status: SHIPPED | OUTPATIENT
Start: 2023-08-21

## 2023-10-16 ENCOUNTER — OFFICE VISIT (OUTPATIENT)
Dept: INTERNAL MEDICINE | Facility: CLINIC | Age: 63
End: 2023-10-16
Payer: MEDICARE

## 2023-10-16 ENCOUNTER — LAB (OUTPATIENT)
Dept: INTERNAL MEDICINE | Facility: CLINIC | Age: 63
End: 2023-10-16
Payer: MEDICARE

## 2023-10-16 VITALS
RESPIRATION RATE: 16 BRPM | DIASTOLIC BLOOD PRESSURE: 78 MMHG | HEIGHT: 62 IN | HEART RATE: 81 BPM | OXYGEN SATURATION: 98 % | WEIGHT: 235 LBS | SYSTOLIC BLOOD PRESSURE: 126 MMHG | TEMPERATURE: 97.8 F | BODY MASS INDEX: 43.24 KG/M2

## 2023-10-16 DIAGNOSIS — Z12.31 ENCOUNTER FOR SCREENING MAMMOGRAM FOR MALIGNANT NEOPLASM OF BREAST: ICD-10-CM

## 2023-10-16 DIAGNOSIS — Z23 PNEUMOCOCCAL VACCINE ADMINISTERED: ICD-10-CM

## 2023-10-16 DIAGNOSIS — E78.2 MIXED HYPERLIPIDEMIA: ICD-10-CM

## 2023-10-16 DIAGNOSIS — E11.65 TYPE 2 DIABETES MELLITUS WITH HYPERGLYCEMIA, WITH LONG-TERM CURRENT USE OF INSULIN: ICD-10-CM

## 2023-10-16 DIAGNOSIS — I10 ESSENTIAL HYPERTENSION: ICD-10-CM

## 2023-10-16 DIAGNOSIS — F51.01 PRIMARY INSOMNIA: Primary | ICD-10-CM

## 2023-10-16 DIAGNOSIS — Z79.4 TYPE 2 DIABETES MELLITUS WITH HYPERGLYCEMIA, WITH LONG-TERM CURRENT USE OF INSULIN: ICD-10-CM

## 2023-10-16 DIAGNOSIS — M25.571 ACUTE RIGHT ANKLE PAIN: ICD-10-CM

## 2023-10-16 DIAGNOSIS — M79.674 TOE PAIN, RIGHT: ICD-10-CM

## 2023-10-16 DIAGNOSIS — I10 ESSENTIAL HYPERTENSION: Primary | ICD-10-CM

## 2023-10-16 DIAGNOSIS — Z23 INFLUENZA VACCINE ADMINISTERED: ICD-10-CM

## 2023-10-16 LAB
ALBUMIN SERPL-MCNC: 3.8 G/DL (ref 3.5–5.2)
ALBUMIN/GLOB SERPL: 1.3 G/DL
ALP SERPL-CCNC: 62 U/L (ref 39–117)
ALT SERPL W P-5'-P-CCNC: 19 U/L (ref 1–33)
ANION GAP SERPL CALCULATED.3IONS-SCNC: 13.4 MMOL/L (ref 5–15)
AST SERPL-CCNC: 25 U/L (ref 1–32)
BILIRUB SERPL-MCNC: <0.2 MG/DL (ref 0–1.2)
BUN SERPL-MCNC: 13 MG/DL (ref 8–23)
BUN/CREAT SERPL: 9.2 (ref 7–25)
CALCIUM SPEC-SCNC: 9.1 MG/DL (ref 8.6–10.5)
CHLORIDE SERPL-SCNC: 107 MMOL/L (ref 98–107)
CHOLEST SERPL-MCNC: 105 MG/DL (ref 0–200)
CO2 SERPL-SCNC: 22.6 MMOL/L (ref 22–29)
CREAT SERPL-MCNC: 1.42 MG/DL (ref 0.57–1)
EGFRCR SERPLBLD CKD-EPI 2021: 41.4 ML/MIN/1.73
GLOBULIN UR ELPH-MCNC: 2.9 GM/DL
GLUCOSE SERPL-MCNC: 132 MG/DL (ref 65–99)
HDLC SERPL-MCNC: 51 MG/DL (ref 40–60)
LDLC SERPL CALC-MCNC: 36 MG/DL (ref 0–100)
LDLC/HDLC SERPL: 0.68 {RATIO}
POTASSIUM SERPL-SCNC: 4.5 MMOL/L (ref 3.5–5.2)
PROT SERPL-MCNC: 6.7 G/DL (ref 6–8.5)
SODIUM SERPL-SCNC: 143 MMOL/L (ref 136–145)
TRIGL SERPL-MCNC: 96 MG/DL (ref 0–150)
VLDLC SERPL-MCNC: 18 MG/DL (ref 5–40)

## 2023-10-16 PROCEDURE — 3052F HG A1C>EQUAL 8.0%<EQUAL 9.0%: CPT | Performed by: NURSE PRACTITIONER

## 2023-10-16 PROCEDURE — 80053 COMPREHEN METABOLIC PANEL: CPT | Performed by: NURSE PRACTITIONER

## 2023-10-16 PROCEDURE — 3074F SYST BP LT 130 MM HG: CPT | Performed by: NURSE PRACTITIONER

## 2023-10-16 PROCEDURE — 3078F DIAST BP <80 MM HG: CPT | Performed by: NURSE PRACTITIONER

## 2023-10-16 PROCEDURE — 1160F RVW MEDS BY RX/DR IN RCRD: CPT | Performed by: NURSE PRACTITIONER

## 2023-10-16 PROCEDURE — 90677 PCV20 VACCINE IM: CPT | Performed by: NURSE PRACTITIONER

## 2023-10-16 PROCEDURE — 99214 OFFICE O/P EST MOD 30 MIN: CPT | Performed by: NURSE PRACTITIONER

## 2023-10-16 PROCEDURE — 1159F MED LIST DOCD IN RCRD: CPT | Performed by: NURSE PRACTITIONER

## 2023-10-16 PROCEDURE — G0009 ADMIN PNEUMOCOCCAL VACCINE: HCPCS | Performed by: NURSE PRACTITIONER

## 2023-10-16 PROCEDURE — 80061 LIPID PANEL: CPT | Performed by: NURSE PRACTITIONER

## 2023-10-16 PROCEDURE — 36415 COLL VENOUS BLD VENIPUNCTURE: CPT | Performed by: NURSE PRACTITIONER

## 2023-10-16 PROCEDURE — 90686 IIV4 VACC NO PRSV 0.5 ML IM: CPT | Performed by: NURSE PRACTITIONER

## 2023-10-16 PROCEDURE — G0008 ADMIN INFLUENZA VIRUS VAC: HCPCS | Performed by: NURSE PRACTITIONER

## 2023-10-16 PROCEDURE — 85027 COMPLETE CBC AUTOMATED: CPT | Performed by: NURSE PRACTITIONER

## 2023-10-16 RX ORDER — EMPAGLIFLOZIN 10 MG/1
10 TABLET, FILM COATED ORAL DAILY
COMMUNITY
Start: 2023-08-08 | End: 2024-08-07

## 2023-10-16 RX ORDER — MIRTAZAPINE 7.5 MG/1
7.5 TABLET, FILM COATED ORAL NIGHTLY
Qty: 30 TABLET | Refills: 1 | Status: SHIPPED | OUTPATIENT
Start: 2023-10-16

## 2023-10-16 NOTE — PROGRESS NOTES
"Subjective   Chief Complaint   Patient presents with    Diabetes    Foot Pain     \"I fell on my R foot and it swelled up, the swelling is gone but the pain is still there\"      Jayna Ambrocio is a 63 y.o. female.     The patient is here today for follow-up.    The patient states that she had a hemoglobin A1c level drawn in 08/2023, which was 9.3 percent. The insulin that she was prescribed to take 1 time weekly, is not available at the pharmacy when she is due to refill the medication. She has not had her medications for 2 whole months in the past. She is seeing Ariana VALIENTE with endocrinology. She has been prescribed Trulicty and has been taking this medication for less than 1 month. At her last visit with Dr. Camejo, she was prescribed Jardiance and she has been tolerating this medication well and reports that it h as been decreasing her blood glucose levels. She is taking 20 units of insulin 2 times daily.     Her sleep test has been rescheduled for 12/2023, due to technician not able to be present at her previously scheduled date.     The patient experienced a fall at home with her whole body falling onto her right foot causing difficulty ambulating. She denies seeking treatment at the ER for her injury. She has been able to reduce the swelling by soaking her foot in Epsom salts and putting pain medication on her foot. She is still experiencing pain to the plantar aspect of all of the digits on her right foot.     She will be due for mammogram after 11/22/2023. Her blood pressure is stable and at goal today.   Her most recent lipid panel was within normal limits. She is still taking Crestor at this time.     She has been feeling down and depressed recently. She has been taking Prozac, Wellbutrin, and trazodone at night to help her sleep; however, she is still having difficulty falling asleep and will occasionally stay awake all night long. On the nights that she is awake all night long, she may take " a nap near 2:00 AM and 3:00 AM for approximately 1 hour.       I have reviewed the following portions of the patient's history and confirmed they are accurate: allergies, current medications, past family history, past medical history, past social history, past surgical history, and problem list    Review of Systems  Pertinent items are noted in HPI.     Current Outpatient Medications on File Prior to Visit   Medication Sig    ACCU-CHEK SOFTCLIX LANCETS lancets USE TO TEST BLOOD GLUCOSE THREE TIMES DAILY AS DIRECTED    Aspirin 81 MG capsule Take 1 tablet by mouth Daily.    B-D UF III MINI PEN NEEDLES 31G X 5 MM misc Use with insulin twice a day    B-D ULTRAFINE III SHORT PEN 31G X 8 MM misc Inject 1 unit marking on U-100 syringe as directed 2 (Two) Times a Day.    Blood Glucose Monitoring Suppl (ACCU-CHEK MARIO PLUS) w/Device kit use to test blood glucose three times daily    buPROPion XL (WELLBUTRIN XL) 300 MG 24 hr tablet TAKE 1 TABLET BY MOUTH DAILY. NEEDS APPT FOR FURTHER REFILLS.    Cholecalciferol (VITAMIN D-3) 25 MCG (1000 UT) capsule Take 1,000 Units by mouth Daily.    clopidogrel (PLAVIX) 75 MG tablet TAKE 1 TABLET BY MOUTH DAILY. PATIENT MUST KEEP APPOINTMENT FOR FURTHER REFILLS.    FLUoxetine (PROzac) 40 MG capsule TAKE 1 CAPSULE BY MOUTH EVERY DAY    glucose blood (OneTouch Verio) test strip TEST THREE TIMES A DAY E11.9    Insulin Lispro Prot & Lispro (HumaLOG Mix 75/25 KwikPen) (75-25) 100 UNIT/ML suspension pen-injector pen Inject 20 Units under the skin into the appropriate area as directed 2 (Two) Times a Day.    isosorbide mononitrate (IMDUR) 60 MG 24 hr tablet Take 1 tablet by mouth Every Morning.    Jardiance 10 MG tablet tablet Take 1 tablet by mouth Daily.    lisinopril (PRINIVIL,ZESTRIL) 2.5 MG tablet Take 1 tablet by mouth Daily.    methocarbamol (ROBAXIN) 500 MG tablet TAKE 1 TABLET BY MOUTH THREE TIMES A DAY AS NEEDED FOR MUSCLE SPASM    metoprolol tartrate (LOPRESSOR) 50 MG tablet Take 1  "tablet by mouth 2 (Two) Times a Day.    pantoprazole (PROTONIX) 40 MG EC tablet TAKE 1 TABLET BY MOUTH EVERY DAY    QUEtiapine (SEROquel) 50 MG tablet Take 1 tablet by mouth Every Night.    rosuvastatin (CRESTOR) 40 MG tablet Take 1 tablet by mouth Daily. 200001    Specialty Vitamins Products (HEALTHY HEART PO) Take 400 unit marking on U-100 syringe by mouth Daily.    Trulicity 1.5 MG/0.5ML solution pen-injector INJECT 1.5 MG UNDER THE SKIN 1 (ONE) TIME PER WEEK.    [DISCONTINUED] exenatide er (BYDUREON) 2 MG/0.85ML auto-injector injection Inject 0.85 mL under the skin into the appropriate area as directed 1 (One) Time Per Week.    [DISCONTINUED] traZODone (DESYREL) 150 MG tablet TAKE 1 TABLET BY MOUTH EVERYDAY AT BEDTIME     No current facility-administered medications on file prior to visit.       Objective   Vitals:    10/16/23 1046   BP: 126/78   Pulse: 81   Resp: 16   Temp: 97.8 °F (36.6 °C)   TempSrc: Tympanic   SpO2: 98%   Weight: 107 kg (235 lb)   Height: 157.5 cm (62\")     Body mass index is 42.98 kg/m².    Physical Exam  Vitals reviewed.   Constitutional:       Appearance: Normal appearance. She is well-developed.   HENT:      Head: Normocephalic and atraumatic.      Nose: Nose normal.   Eyes:      General: Lids are normal.      Conjunctiva/sclera: Conjunctivae normal.      Pupils: Pupils are equal, round, and reactive to light.   Neck:      Thyroid: No thyromegaly.      Trachea: Trachea normal.   Cardiovascular:      Rate and Rhythm: Normal rate and regular rhythm.      Heart sounds: Normal heart sounds.   Pulmonary:      Effort: Pulmonary effort is normal. No respiratory distress.      Breath sounds: Normal breath sounds.   Musculoskeletal:      Right ankle: No swelling. Tenderness present.      Comments: Right toe tenderness   Skin:     General: Skin is warm and dry.   Neurological:      Mental Status: She is alert and oriented to person, place, and time.      GCS: GCS eye subscore is 4. GCS verbal " subscore is 5. GCS motor subscore is 6.   Psychiatric:         Attention and Perception: Attention normal.         Mood and Affect: Mood normal.         Speech: Speech normal.         Behavior: Behavior normal. Behavior is cooperative.         Thought Content: Thought content normal.         Assessment & Plan   Problem List Items Addressed This Visit       Type 2 diabetes mellitus    Relevant Medications    Jardiance 10 MG tablet tablet    Essential hypertension    Relevant Orders    CBC (No Diff)    Comprehensive Metabolic Panel    Lipid Panel    Insomnia - Primary    Relevant Medications    mirtazapine (REMERON) 7.5 MG tablet     Other Visit Diagnoses       Toe pain, right        Relevant Orders    XR Foot 3+ View Right    CBC (No Diff)    Comprehensive Metabolic Panel    Acute right ankle pain        Relevant Orders    XR Ankle 3+ View Right    CBC (No Diff)    Comprehensive Metabolic Panel    Mixed hyperlipidemia        Relevant Orders    Lipid Panel    Influenza vaccine administered        Relevant Orders    Fluzone (or Fluarix & Flulaval for VFC) >6 Mos (7725-1080)    Pneumococcal vaccine administered        Relevant Orders    Pneumococcal Conjugate Vaccine 20-Valent (PCV20)    Encounter for screening mammogram for malignant neoplasm of breast        Relevant Orders    Mammo Screening Digital Tomosynthesis Bilateral With CAD           Plan:    1. Insomnia  - Chronic, unstable.  - Start Remeron.  - Discontinue trazodone.  - Continue Seroquel and consider increasing Seroquel if there is no improvement with Remeron.    2. Type 2 diabetes mellitus  - Chronic, unstable.  - I expect her hemoglobin A1c to improve.  - Patient will continue Humalog mix, Trulicity, and Jardiance.  - Consider increasing Jardiance.  - She has an upcoming appointment with an endocrinologist on 12/2023.  - Follow a diabetic diet.    3. Right toe pain  - New, unstable.  - X-ray ordered.  - Checking some laboratory tests.    4. Acute right  ankle pain  - New, unstable.  - x-ray ordered.    5. Hyperlipidemia  - Chronic, stable.  - Continue Crestor.  - Checking fasting lipid panel.  - Follow heart-healthy, low-cholesterol diet.    6. Hypertension  - Chronic, stable.  - Continue lisinopril, metoprolol, and Imdur.  - Continue to follow-up with cardiology.        Current Outpatient Medications:     ACCU-CHEK SOFTCLIX LANCETS lancets, USE TO TEST BLOOD GLUCOSE THREE TIMES DAILY AS DIRECTED, Disp: , Rfl: 5    Aspirin 81 MG capsule, Take 1 tablet by mouth Daily., Disp: 90 capsule, Rfl: 1    B-D UF III MINI PEN NEEDLES 31G X 5 MM misc, Use with insulin twice a day, Disp: 60 each, Rfl: 11    B-D ULTRAFINE III SHORT PEN 31G X 8 MM misc, Inject 1 unit marking on U-100 syringe as directed 2 (Two) Times a Day., Disp: 90 each, Rfl: 2    Blood Glucose Monitoring Suppl (ACCU-CHEK MARIO PLUS) w/Device kit, use to test blood glucose three times daily, Disp: , Rfl: 5    buPROPion XL (WELLBUTRIN XL) 300 MG 24 hr tablet, TAKE 1 TABLET BY MOUTH DAILY. NEEDS APPT FOR FURTHER REFILLS., Disp: 90 tablet, Rfl: 0    Cholecalciferol (VITAMIN D-3) 25 MCG (1000 UT) capsule, Take 1,000 Units by mouth Daily., Disp: , Rfl:     clopidogrel (PLAVIX) 75 MG tablet, TAKE 1 TABLET BY MOUTH DAILY. PATIENT MUST KEEP APPOINTMENT FOR FURTHER REFILLS., Disp: 90 tablet, Rfl: 1    FLUoxetine (PROzac) 40 MG capsule, TAKE 1 CAPSULE BY MOUTH EVERY DAY, Disp: 90 capsule, Rfl: 1    glucose blood (OneTouch Verio) test strip, TEST THREE TIMES A DAY E11.9, Disp: 100 each, Rfl: 1    Insulin Lispro Prot & Lispro (HumaLOG Mix 75/25 KwikPen) (75-25) 100 UNIT/ML suspension pen-injector pen, Inject 20 Units under the skin into the appropriate area as directed 2 (Two) Times a Day., Disp: 5 pen, Rfl: 3    isosorbide mononitrate (IMDUR) 60 MG 24 hr tablet, Take 1 tablet by mouth Every Morning., Disp: 90 tablet, Rfl: 2    Jardiance 10 MG tablet tablet, Take 1 tablet by mouth Daily., Disp: , Rfl:     lisinopril  (PRINIVIL,ZESTRIL) 2.5 MG tablet, Take 1 tablet by mouth Daily., Disp: 30 tablet, Rfl: 5    methocarbamol (ROBAXIN) 500 MG tablet, TAKE 1 TABLET BY MOUTH THREE TIMES A DAY AS NEEDED FOR MUSCLE SPASM, Disp: 90 tablet, Rfl: 2    metoprolol tartrate (LOPRESSOR) 50 MG tablet, Take 1 tablet by mouth 2 (Two) Times a Day., Disp: , Rfl:     pantoprazole (PROTONIX) 40 MG EC tablet, TAKE 1 TABLET BY MOUTH EVERY DAY, Disp: 90 tablet, Rfl: 0    QUEtiapine (SEROquel) 50 MG tablet, Take 1 tablet by mouth Every Night., Disp: 90 tablet, Rfl: 1    rosuvastatin (CRESTOR) 40 MG tablet, Take 1 tablet by mouth Daily. 200001, Disp: 30 tablet, Rfl: 5    Specialty Vitamins Products (HEALTHY HEART PO), Take 400 unit marking on U-100 syringe by mouth Daily., Disp: , Rfl:     Trulicity 1.5 MG/0.5ML solution pen-injector, INJECT 1.5 MG UNDER THE SKIN 1 (ONE) TIME PER WEEK., Disp: , Rfl:     mirtazapine (REMERON) 7.5 MG tablet, Take 1 tablet by mouth Every Night., Disp: 30 tablet, Rfl: 1       Plan of care reviewed with the patient at the conclusion of today's visit.  Education was provided regarding diagnosis, management, and any prescribed or recommended OTC medications.  Patient verbalized understanding of and agreement with management plan.     Return in about 3 months (around 1/16/2024), or if symptoms worsen or fail to improve, for Follow-up.    Transcribed from ambient dictation for ROCCO Monroy by Linette Guillen.  10/16/23   19:25 EDT    Patient or patient representative verbalized consent to the visit recording.  I have personally performed the services described in this document as transcribed by the above individual, and it is both accurate and complete.

## 2023-10-17 LAB
DEPRECATED RDW RBC AUTO: 41.8 FL (ref 37–54)
ERYTHROCYTE [DISTWIDTH] IN BLOOD BY AUTOMATED COUNT: 13 % (ref 12.3–15.4)
HCT VFR BLD AUTO: 39.2 % (ref 34–46.6)
HGB BLD-MCNC: 13 G/DL (ref 12–15.9)
MCH RBC QN AUTO: 29.7 PG (ref 26.6–33)
MCHC RBC AUTO-ENTMCNC: 33.2 G/DL (ref 31.5–35.7)
MCV RBC AUTO: 89.5 FL (ref 79–97)
PLATELET # BLD AUTO: 244 10*3/MM3 (ref 140–450)
PMV BLD AUTO: 10.5 FL (ref 6–12)
RBC # BLD AUTO: 4.38 10*6/MM3 (ref 3.77–5.28)
WBC NRBC COR # BLD: 6.54 10*3/MM3 (ref 3.4–10.8)

## 2023-10-25 ENCOUNTER — EXTERNAL PBMM DATA (OUTPATIENT)
Dept: PHARMACY | Facility: OTHER | Age: 63
End: 2023-10-25
Payer: MEDICARE

## 2023-10-31 ENCOUNTER — POP HEALTH PHARMACY (OUTPATIENT)
Dept: PHARMACY | Facility: OTHER | Age: 63
End: 2023-10-31
Payer: MEDICARE

## 2023-11-02 ENCOUNTER — POP HEALTH PHARMACY (OUTPATIENT)
Dept: PHARMACY | Facility: OTHER | Age: 63
End: 2023-11-02
Payer: MEDICARE

## 2023-11-27 ENCOUNTER — EXTERNAL PBMM DATA (OUTPATIENT)
Dept: PHARMACY | Facility: OTHER | Age: 63
End: 2023-11-27
Payer: MEDICARE

## 2023-12-05 ENCOUNTER — POP HEALTH PHARMACY (OUTPATIENT)
Dept: PHARMACY | Facility: OTHER | Age: 63
End: 2023-12-05
Payer: MEDICARE

## 2023-12-29 ENCOUNTER — POP HEALTH PHARMACY (OUTPATIENT)
Dept: PHARMACY | Facility: OTHER | Age: 63
End: 2023-12-29
Payer: MEDICARE

## 2024-01-01 DIAGNOSIS — G47.09 OTHER INSOMNIA: ICD-10-CM

## 2024-01-02 RX ORDER — TRAZODONE HYDROCHLORIDE 150 MG/1
TABLET ORAL
Qty: 90 TABLET | Refills: 1 | OUTPATIENT
Start: 2024-01-02

## 2024-01-12 ENCOUNTER — OFFICE VISIT (OUTPATIENT)
Dept: INTERNAL MEDICINE | Facility: CLINIC | Age: 64
End: 2024-01-12
Payer: MEDICARE

## 2024-01-12 VITALS
BODY MASS INDEX: 43.02 KG/M2 | RESPIRATION RATE: 18 BRPM | TEMPERATURE: 96.9 F | WEIGHT: 235.2 LBS | HEART RATE: 64 BPM | SYSTOLIC BLOOD PRESSURE: 122 MMHG | DIASTOLIC BLOOD PRESSURE: 78 MMHG | OXYGEN SATURATION: 96 %

## 2024-01-12 DIAGNOSIS — F41.8 DEPRESSION WITH ANXIETY: ICD-10-CM

## 2024-01-12 DIAGNOSIS — Z79.4 TYPE 2 DIABETES MELLITUS WITH HYPERGLYCEMIA, WITH LONG-TERM CURRENT USE OF INSULIN: Primary | ICD-10-CM

## 2024-01-12 DIAGNOSIS — F51.01 PRIMARY INSOMNIA: ICD-10-CM

## 2024-01-12 DIAGNOSIS — E11.65 TYPE 2 DIABETES MELLITUS WITH HYPERGLYCEMIA, WITH LONG-TERM CURRENT USE OF INSULIN: Primary | ICD-10-CM

## 2024-01-12 DIAGNOSIS — N18.32 STAGE 3B CHRONIC KIDNEY DISEASE: ICD-10-CM

## 2024-01-12 DIAGNOSIS — K21.9 GASTROESOPHAGEAL REFLUX DISEASE WITHOUT ESOPHAGITIS: ICD-10-CM

## 2024-01-12 LAB
EXPIRATION DATE: ABNORMAL
HBA1C MFR BLD: 7.5 % (ref 4.5–5.7)
Lab: ABNORMAL

## 2024-01-12 RX ORDER — PANTOPRAZOLE SODIUM 40 MG/1
40 TABLET, DELAYED RELEASE ORAL DAILY
Qty: 90 TABLET | Refills: 1 | Status: SHIPPED | OUTPATIENT
Start: 2024-01-12

## 2024-01-12 RX ORDER — FLUOXETINE HYDROCHLORIDE 20 MG/1
60 CAPSULE ORAL DAILY
Qty: 270 CAPSULE | Refills: 1 | Status: SHIPPED | OUTPATIENT
Start: 2024-01-12

## 2024-01-12 RX ORDER — PANTOPRAZOLE SODIUM 40 MG/1
TABLET, DELAYED RELEASE ORAL
Qty: 90 TABLET | Refills: 0 | OUTPATIENT
Start: 2024-01-12

## 2024-01-12 NOTE — PROGRESS NOTES
Subjective   Chief Complaint   Patient presents with    Diabetes     Type 2      Jayna Ambrocio is a 63 y.o. female.     The patient is here today for a follow-up.     Her last hemoglobin A1c level was 9.3 percent on 08/09/2023, and today it is 7.5 percent. She has been attempting to watch her diet. She has been on 2 different insulins. She denies having her Trulicity in approximately 1 month. She sees an endocrinologist at .     She is taking Prozac 40 mg; however, in the beginning it helped slightly, and is inquiring about increasing the dosage. She is taking Wellbutrin. She is currently taking Seroquel and mirtazapine nightly, and slightly help. She may sleep all night, and she is up at 5:00 AM or 6:00 AM, and denies sleeping past then. She denies wanting to increase her Seroquel.     She is taking metoprolol 50 mg twice daily for blood pressure. She sees Dr. Camejo at .     She needs refills on her Protonix.     She has seen a nephrologist in the past.     She has had to reschedule her mammogram due to her insurance having not started currently. .    I have reviewed the following portions of the patient's history and confirmed they are accurate: allergies, current medications, past family history, past medical history, past social history, past surgical history, and problem list    Review of Systems  Pertinent items are noted in HPI.     Current Outpatient Medications on File Prior to Visit   Medication Sig    ACCU-CHEK SOFTCLIX LANCETS lancets USE TO TEST BLOOD GLUCOSE THREE TIMES DAILY AS DIRECTED    Aspirin 81 MG capsule Take 1 tablet by mouth Daily.    B-D UF III MINI PEN NEEDLES 31G X 5 MM misc Use with insulin twice a day    B-D ULTRAFINE III SHORT PEN 31G X 8 MM misc Inject 1 unit marking on U-100 syringe as directed 2 (Two) Times a Day.    Blood Glucose Monitoring Suppl (ACCU-CHEK MARIO PLUS) w/Device kit use to test blood glucose three times daily    buPROPion XL (WELLBUTRIN XL) 300 MG 24 hr  tablet TAKE 1 TABLET BY MOUTH DAILY. NEEDS APPT FOR FURTHER REFILLS.    Cholecalciferol (VITAMIN D-3) 25 MCG (1000 UT) capsule Take 1,000 Units by mouth Daily.    clopidogrel (PLAVIX) 75 MG tablet TAKE 1 TABLET BY MOUTH DAILY. PATIENT MUST KEEP APPOINTMENT FOR FURTHER REFILLS.    glucose blood (OneTouch Verio) test strip TEST THREE TIMES A DAY E11.9    Insulin Lispro Prot & Lispro (HumaLOG Mix 75/25 KwikPen) (75-25) 100 UNIT/ML suspension pen-injector pen Inject 20 Units under the skin into the appropriate area as directed 2 (Two) Times a Day.    isosorbide mononitrate (IMDUR) 60 MG 24 hr tablet Take 1 tablet by mouth Every Morning.    Jardiance 10 MG tablet tablet Take 1 tablet by mouth Daily.    lisinopril (PRINIVIL,ZESTRIL) 2.5 MG tablet Take 1 tablet by mouth Daily.    methocarbamol (ROBAXIN) 500 MG tablet TAKE 1 TABLET BY MOUTH THREE TIMES A DAY AS NEEDED FOR MUSCLE SPASM    metoprolol tartrate (LOPRESSOR) 50 MG tablet Take 1 tablet by mouth 2 (Two) Times a Day.    mirtazapine (REMERON) 7.5 MG tablet Take 1 tablet by mouth Every Night.    QUEtiapine (SEROquel) 50 MG tablet Take 1 tablet by mouth Every Night.    rosuvastatin (CRESTOR) 40 MG tablet Take 1 tablet by mouth Daily. 200001    Specialty Vitamins Products (HEALTHY HEART PO) Take 400 unit marking on U-100 syringe by mouth Daily.    [DISCONTINUED] FLUoxetine (PROzac) 40 MG capsule TAKE 1 CAPSULE BY MOUTH EVERY DAY    [DISCONTINUED] pantoprazole (PROTONIX) 40 MG EC tablet TAKE 1 TABLET BY MOUTH EVERY DAY    [DISCONTINUED] Trulicity 1.5 MG/0.5ML solution pen-injector INJECT 1.5 MG UNDER THE SKIN 1 (ONE) TIME PER WEEK.     No current facility-administered medications on file prior to visit.       Objective   Vitals:    01/12/24 1105   BP: 122/78   BP Location: Left arm   Patient Position: Sitting   Cuff Size: Large Adult   Pulse: 64   Resp: 18   Temp: 96.9 °F (36.1 °C)   TempSrc: Tympanic   SpO2: 96%   Weight: 107 kg (235 lb 3.2 oz)   PainSc: 0-No pain      Body mass index is 43.02 kg/m².    Physical Exam  Vitals reviewed.   Constitutional:       Appearance: Normal appearance. She is well-developed.   HENT:      Head: Normocephalic and atraumatic.      Nose: Nose normal.   Eyes:      General: Lids are normal.      Conjunctiva/sclera: Conjunctivae normal.      Pupils: Pupils are equal, round, and reactive to light.   Neck:      Thyroid: No thyromegaly.      Trachea: Trachea normal.   Pulmonary:      Effort: Pulmonary effort is normal. No respiratory distress.   Skin:     General: Skin is warm and dry.   Neurological:      Mental Status: She is alert and oriented to person, place, and time.      GCS: GCS eye subscore is 4. GCS verbal subscore is 5. GCS motor subscore is 6.   Psychiatric:         Attention and Perception: Attention normal.         Mood and Affect: Mood normal.         Speech: Speech normal.         Behavior: Behavior normal. Behavior is cooperative.         Assessment & Plan   Problem List Items Addressed This Visit       Type 2 diabetes mellitus - Primary    Relevant Medications    Dulaglutide 0.75 MG/0.5ML solution pen-injector    Other Relevant Orders    POC Glycosylated Hemoglobin (Hb A1C) (Completed)    Insomnia    Gastroesophageal reflux disease    Relevant Medications    pantoprazole (PROTONIX) 40 MG EC tablet     Other Visit Diagnoses       Depression with anxiety        Relevant Medications    FLUoxetine (PROzac) 20 MG capsule    Stage 3b chronic kidney disease        Relevant Orders    Ambulatory Referral to Nephrology (Completed)           Type 2 diabetes  - Chronic, unstable.  - Continue Jardiance, Humalog mix.  - Restart Trulicity.  - Follow a diabetic diet.  - Continue follow-ups with endocrinology.    Depression with anxiety  - Chronic, unstable.  - Increase Prozac.  - Continue Wellbutrin.  - Continue Remeron and Seroquel for sleep.    GERD  - Chronic, unstable.  - Restart Protonix.    Insomnia  - Chronic, stable.  - Continue Remeron  and Seroquel.    Stage III chronic kidney disease  - Chronic, unstable.  - Referring to nephrology for consultation.  - She will drink adequate amount of water and refrain from NSAIDs.          Current Outpatient Medications:     ACCU-CHEK SOFTCLIX LANCETS lancets, USE TO TEST BLOOD GLUCOSE THREE TIMES DAILY AS DIRECTED, Disp: , Rfl: 5    Aspirin 81 MG capsule, Take 1 tablet by mouth Daily., Disp: 90 capsule, Rfl: 1    B-D UF III MINI PEN NEEDLES 31G X 5 MM misc, Use with insulin twice a day, Disp: 60 each, Rfl: 11    B-D ULTRAFINE III SHORT PEN 31G X 8 MM misc, Inject 1 unit marking on U-100 syringe as directed 2 (Two) Times a Day., Disp: 90 each, Rfl: 2    Blood Glucose Monitoring Suppl (ACCU-CHEK MARIO PLUS) w/Device kit, use to test blood glucose three times daily, Disp: , Rfl: 5    buPROPion XL (WELLBUTRIN XL) 300 MG 24 hr tablet, TAKE 1 TABLET BY MOUTH DAILY. NEEDS APPT FOR FURTHER REFILLS., Disp: 90 tablet, Rfl: 0    Cholecalciferol (VITAMIN D-3) 25 MCG (1000 UT) capsule, Take 1,000 Units by mouth Daily., Disp: , Rfl:     clopidogrel (PLAVIX) 75 MG tablet, TAKE 1 TABLET BY MOUTH DAILY. PATIENT MUST KEEP APPOINTMENT FOR FURTHER REFILLS., Disp: 90 tablet, Rfl: 1    glucose blood (OneTouch Verio) test strip, TEST THREE TIMES A DAY E11.9, Disp: 100 each, Rfl: 1    Insulin Lispro Prot & Lispro (HumaLOG Mix 75/25 KwikPen) (75-25) 100 UNIT/ML suspension pen-injector pen, Inject 20 Units under the skin into the appropriate area as directed 2 (Two) Times a Day., Disp: 5 pen, Rfl: 3    isosorbide mononitrate (IMDUR) 60 MG 24 hr tablet, Take 1 tablet by mouth Every Morning., Disp: 90 tablet, Rfl: 2    Jardiance 10 MG tablet tablet, Take 1 tablet by mouth Daily., Disp: , Rfl:     lisinopril (PRINIVIL,ZESTRIL) 2.5 MG tablet, Take 1 tablet by mouth Daily., Disp: 30 tablet, Rfl: 5    methocarbamol (ROBAXIN) 500 MG tablet, TAKE 1 TABLET BY MOUTH THREE TIMES A DAY AS NEEDED FOR MUSCLE SPASM, Disp: 90 tablet, Rfl: 2     metoprolol tartrate (LOPRESSOR) 50 MG tablet, Take 1 tablet by mouth 2 (Two) Times a Day., Disp: , Rfl:     mirtazapine (REMERON) 7.5 MG tablet, Take 1 tablet by mouth Every Night., Disp: 30 tablet, Rfl: 1    pantoprazole (PROTONIX) 40 MG EC tablet, Take 1 tablet by mouth Daily., Disp: 90 tablet, Rfl: 1    QUEtiapine (SEROquel) 50 MG tablet, Take 1 tablet by mouth Every Night., Disp: 90 tablet, Rfl: 1    rosuvastatin (CRESTOR) 40 MG tablet, Take 1 tablet by mouth Daily. 200001, Disp: 30 tablet, Rfl: 5    Specialty Vitamins Products (HEALTHY HEART PO), Take 400 unit marking on U-100 syringe by mouth Daily., Disp: , Rfl:     Dulaglutide 0.75 MG/0.5ML solution pen-injector, Inject 0.75 mg under the skin into the appropriate area as directed 1 (One) Time Per Week., Disp: 2 mL, Rfl: 1    FLUoxetine (PROzac) 20 MG capsule, Take 3 capsules by mouth Daily., Disp: 270 capsule, Rfl: 1       Plan of care reviewed with the patient at the conclusion of today's visit.  Education was provided regarding diagnosis, management, and any prescribed or recommended OTC medications.  Patient verbalized understanding of and agreement with management plan.     Return in about 3 months (around 4/12/2024), or if symptoms worsen or fail to improve, for Follow-up.      Transcribed from ambient dictation for [facility] Monroy by Anna Donnelly.  01/12/24   11:33 EST    Patient or patient representative verbalized consent to the visit recording.  I have personally performed the services described in this document as transcribed by the above individual, and it is both accurate and complete.

## 2024-01-31 ENCOUNTER — HOSPITAL ENCOUNTER (OUTPATIENT)
Dept: MAMMOGRAPHY | Facility: HOSPITAL | Age: 64
Discharge: HOME | End: 2024-01-31
Admitting: NURSE PRACTITIONER
Payer: MEDICARE

## 2024-01-31 DIAGNOSIS — Z12.31 ENCOUNTER FOR SCREENING MAMMOGRAM FOR MALIGNANT NEOPLASM OF BREAST: ICD-10-CM

## 2024-01-31 PROCEDURE — 77063 BREAST TOMOSYNTHESIS BI: CPT

## 2024-01-31 PROCEDURE — 77067 SCR MAMMO BI INCL CAD: CPT

## 2024-02-18 DIAGNOSIS — F41.8 DEPRESSION WITH ANXIETY: ICD-10-CM

## 2024-02-18 DIAGNOSIS — G47.09 OTHER INSOMNIA: ICD-10-CM

## 2024-02-19 RX ORDER — QUETIAPINE FUMARATE 50 MG/1
50 TABLET, FILM COATED ORAL
Qty: 90 TABLET | Refills: 1 | Status: SHIPPED | OUTPATIENT
Start: 2024-02-19

## 2024-02-19 RX ORDER — FLUOXETINE HYDROCHLORIDE 40 MG/1
CAPSULE ORAL
Qty: 90 CAPSULE | Refills: 1 | OUTPATIENT
Start: 2024-02-19

## 2024-02-19 RX ORDER — TRAZODONE HYDROCHLORIDE 150 MG/1
TABLET ORAL
Qty: 90 TABLET | Refills: 1 | OUTPATIENT
Start: 2024-02-19

## 2024-02-26 ENCOUNTER — TELEPHONE (OUTPATIENT)
Dept: INTERNAL MEDICINE | Facility: CLINIC | Age: 64
End: 2024-02-26
Payer: MEDICARE

## 2024-02-26 NOTE — TELEPHONE ENCOUNTER
Caller: Jayna Ambrocio    Relationship: Self    Best call back number:[phone]     What is the best time to reach you: ANY    Who are you requesting to speak with (clinical staff, provider,  specific staff member): CLINICAL       What was the call regarding: PATIENT STATED THAT THE FLUoxetine (PROzac) 20 MG capsule  IS NOT WORKING FOR HER AND THE PATIENT IS REQUESTING A CALL BACK TO DISCUSS A POSSIBLE ALTERNATIVE.

## 2024-02-28 NOTE — TELEPHONE ENCOUNTER
Please contact patient and advise:    I see you have taken prozac 40mg in the past. I can increase the dose to 40mg and see you back in the office in one month. If you want to try something different I will need to see you to discuss options.

## 2024-04-19 ENCOUNTER — OFFICE VISIT (OUTPATIENT)
Dept: INTERNAL MEDICINE | Facility: CLINIC | Age: 64
End: 2024-04-19
Payer: MEDICARE

## 2024-04-19 VITALS
WEIGHT: 231 LBS | SYSTOLIC BLOOD PRESSURE: 124 MMHG | HEIGHT: 61 IN | OXYGEN SATURATION: 98 % | BODY MASS INDEX: 43.61 KG/M2 | TEMPERATURE: 98 F | DIASTOLIC BLOOD PRESSURE: 76 MMHG | HEART RATE: 78 BPM

## 2024-04-19 DIAGNOSIS — M25.562 ACUTE PAIN OF LEFT KNEE: ICD-10-CM

## 2024-04-19 DIAGNOSIS — M62.838 MUSCLE SPASM: ICD-10-CM

## 2024-04-19 DIAGNOSIS — M25.512 ACUTE PAIN OF LEFT SHOULDER: Primary | ICD-10-CM

## 2024-04-19 DIAGNOSIS — G47.09 OTHER INSOMNIA: ICD-10-CM

## 2024-04-19 PROCEDURE — 99214 OFFICE O/P EST MOD 30 MIN: CPT | Performed by: NURSE PRACTITIONER

## 2024-04-19 PROCEDURE — 3078F DIAST BP <80 MM HG: CPT | Performed by: NURSE PRACTITIONER

## 2024-04-19 PROCEDURE — 3074F SYST BP LT 130 MM HG: CPT | Performed by: NURSE PRACTITIONER

## 2024-04-19 PROCEDURE — 1160F RVW MEDS BY RX/DR IN RCRD: CPT | Performed by: NURSE PRACTITIONER

## 2024-04-19 PROCEDURE — 1159F MED LIST DOCD IN RCRD: CPT | Performed by: NURSE PRACTITIONER

## 2024-04-19 PROCEDURE — 3051F HG A1C>EQUAL 7.0%<8.0%: CPT | Performed by: NURSE PRACTITIONER

## 2024-04-19 RX ORDER — METHOCARBAMOL 500 MG/1
500 TABLET, FILM COATED ORAL 3 TIMES DAILY PRN
Qty: 30 TABLET | Refills: 2 | Status: SHIPPED | OUTPATIENT
Start: 2024-04-19

## 2024-04-19 RX ORDER — TRAZODONE HYDROCHLORIDE 50 MG/1
50 TABLET ORAL NIGHTLY
Qty: 90 TABLET | Refills: 0 | Status: SHIPPED | OUTPATIENT
Start: 2024-04-19

## 2024-04-19 NOTE — PROGRESS NOTES
"  Follow Up Office Visit      Patient Name: Jayna Ambrocio  : 1960   MRN: [id number]   Care Team: Patient Care Team:  Sidra Cobos APRN as PCP - General (Nurse Practitioner)  Devin Leone MD as Consulting Physician (Gastroenterology)    Chief Complaint:    Chief Complaint   Patient presents with    Knee Pain     Left knee     Shoulder Pain     Left shoulder.         History of Present Illness: Jayna Ambrocio is a 63 y.o. female with pertinent medical history significant for type 2 diabetes, coronary artery disease status post CABG, obesity, GERD, osteopenia, depression, anxiety, hypertension and insomnia.    She presents today for new issue of left knee and left shoulder pain.  Symptoms started after her last visit with PCP in January.  She denies any recall of injury that provoked symptoms.  The symptoms are intermittent/wax and wane.  She describes the pain as aching mostly but sometimes throbbing.  The discomfort in both joints is worse with movement and less severe with sitting still.  She is able to sleep without experiencing pain.  She does experience some decreased range of motion of the left shoulder with movement above her head and away from the body.  She denies any numbness, tingling, inflammation or discoloration of skin in these 2 joints.  She has not had symptoms like this prior.  She has not tried any therapies to treat symptoms.    Insomnia-ongoing and chronic.  Notes since last visit with PCP, she has discontinued \"my sleeping meds\" .  She states she does not feel that it works.  She is inquiring about restarting trazodone as she felt as though it worked better.  She has more problem falling asleep rather than staying asleep.  Ports her mood is stable although mentions she has lost 4 loved ones over the last 6 months.          Subjective       I have reviewed and the following portions of the patient's history were updated as appropriate: past family history, " past medical history, past social history, past surgical history and problem list.    Medications:     Current Outpatient Medications:     ACCU-CHEK SOFTCLIX LANCETS lancets, USE TO TEST BLOOD GLUCOSE THREE TIMES DAILY AS DIRECTED, Disp: , Rfl: 5    Aspirin 81 MG capsule, Take 1 tablet by mouth Daily., Disp: 90 capsule, Rfl: 1    B-D UF III MINI PEN NEEDLES 31G X 5 MM misc, Use with insulin twice a day, Disp: 60 each, Rfl: 11    B-D ULTRAFINE III SHORT PEN 31G X 8 MM misc, Inject 1 unit marking on U-100 syringe as directed 2 (Two) Times a Day., Disp: 90 each, Rfl: 2    Blood Glucose Monitoring Suppl (ACCU-CHEK MARIO PLUS) w/Device kit, use to test blood glucose three times daily, Disp: , Rfl: 5    buPROPion XL (WELLBUTRIN XL) 300 MG 24 hr tablet, TAKE 1 TABLET BY MOUTH DAILY. NEEDS APPT FOR FURTHER REFILLS., Disp: 90 tablet, Rfl: 0    Cholecalciferol (VITAMIN D-3) 25 MCG (1000 UT) capsule, Take 1,000 Units by mouth Daily., Disp: , Rfl:     clopidogrel (PLAVIX) 75 MG tablet, TAKE 1 TABLET BY MOUTH DAILY. PATIENT MUST KEEP APPOINTMENT FOR FURTHER REFILLS., Disp: 90 tablet, Rfl: 1    Dulaglutide 0.75 MG/0.5ML solution pen-injector, Inject 0.75 mg under the skin into the appropriate area as directed 1 (One) Time Per Week., Disp: 2 mL, Rfl: 1    FLUoxetine (PROzac) 20 MG capsule, Take 3 capsules by mouth Daily., Disp: 270 capsule, Rfl: 1    glucose blood (OneTouch Verio) test strip, TEST THREE TIMES A DAY E11.9, Disp: 100 each, Rfl: 1    Insulin Lispro Prot & Lispro (HumaLOG Mix 75/25 KwikPen) (75-25) 100 UNIT/ML suspension pen-injector pen, Inject 20 Units under the skin into the appropriate area as directed 2 (Two) Times a Day., Disp: 5 pen, Rfl: 3    isosorbide mononitrate (IMDUR) 60 MG 24 hr tablet, Take 1 tablet by mouth Every Morning., Disp: 90 tablet, Rfl: 2    Jardiance 10 MG tablet tablet, Take 1 tablet by mouth Daily., Disp: , Rfl:     lisinopril (PRINIVIL,ZESTRIL) 2.5 MG tablet, Take 1 tablet by mouth Daily.,  "Disp: 30 tablet, Rfl: 5    methocarbamol (ROBAXIN) 500 MG tablet, Take 1 tablet by mouth 3 (Three) Times a Day As Needed for Muscle Spasms., Disp: 30 tablet, Rfl: 2    metoprolol tartrate (LOPRESSOR) 50 MG tablet, Take 1 tablet by mouth 2 (Two) Times a Day., Disp: , Rfl:     pantoprazole (PROTONIX) 40 MG EC tablet, Take 1 tablet by mouth Daily., Disp: 90 tablet, Rfl: 1    rosuvastatin (CRESTOR) 40 MG tablet, Take 1 tablet by mouth Daily. 200001, Disp: 30 tablet, Rfl: 5    Specialty Vitamins Products (HEALTHY HEART PO), Take 400 unit marking on U-100 syringe by mouth Daily., Disp: , Rfl:     traZODone (DESYREL) 50 MG tablet, Take 1 tablet by mouth Every Night., Disp: 90 tablet, Rfl: 0    Allergies:   No Known Allergies    Objective     Physical Exam:  Vital Signs:   Vitals:    04/19/24 1014   BP: 124/76   BP Location: Left arm   Patient Position: Sitting   Pulse: 78   Temp: 98 °F (36.7 °C)   SpO2: 98%   Weight: 105 kg (231 lb)   Height: 154.9 cm (61\")     Body mass index is 43.65 kg/m².     Physical Exam  Vitals and nursing note reviewed.   Constitutional:       General: She is not in acute distress.  HENT:      Head: Normocephalic and atraumatic.   Cardiovascular:      Rate and Rhythm: Normal rate and regular rhythm.   Pulmonary:      Effort: Pulmonary effort is normal. No respiratory distress.   Musculoskeletal:      Right lower leg: No edema.      Left lower leg: No edema.      Comments: Left shoulder-patient verbalizes pain with shoulder abduction, flexion and internal rotation.  No edema, no heat or erythema    Right knee-negative crepitus, patient verbalizes pain with extension.  There is no instability of the knee joint noted.  No edema ,heat to touch or erythema   Skin:     General: Skin is warm and dry.   Neurological:      Mental Status: She is alert.      Gait: Gait normal.   Psychiatric:         Mood and Affect: Mood normal.         Assessment / Plan      Assessment/Plan:   Problems Addressed This Visit   "  ICD-10-CM ICD-9-CM   1. Acute pain of left shoulder  M25.512 719.41   2. Muscle spasm  M62.838 728.85   3. Acute pain of left knee  M25.562 719.46   4. Other insomnia  G47.09 780.52      Acute pain of the left shoulder  Acute pain of the left knee  -No red flag symptoms, no recall of injury suspect inflammatory/arthritic process  -Suspect left shoulder could be adhesive capsulitis due to diminished ROM's  -Left knee with no significant abnormalities  -Referral to PT to eval and treat  -Refilled methocarbamol 500 mg 3 times daily as needed, patient has not had filled in > 12 months    Insomnia  -Chronic and unstable  -Restart trazodone 50 mg nightly nightly  -Follow-up with PCP as dose may be changed or titrated based on patient response    Plan of care reviewed with patient at the conclusion of today's visit. Education was provided regarding diagnosis and management.  Patient verbalizes understanding of and agreement with management plan.        Follow Up:   Return in about 1 month (around 5/19/2024) for with PCP.        ROCCO Perry  [facility] [address] [facility]    Please note that portions of this note were completed with a voice recognition program.

## 2024-05-19 DIAGNOSIS — G47.09 OTHER INSOMNIA: ICD-10-CM

## 2024-05-19 DIAGNOSIS — F41.8 DEPRESSION WITH ANXIETY: ICD-10-CM

## 2024-05-19 DIAGNOSIS — K21.9 GASTROESOPHAGEAL REFLUX DISEASE WITHOUT ESOPHAGITIS: ICD-10-CM

## 2024-05-20 RX ORDER — FLUOXETINE HYDROCHLORIDE 20 MG/1
60 CAPSULE ORAL DAILY
Qty: 270 CAPSULE | Refills: 1 | Status: SHIPPED | OUTPATIENT
Start: 2024-05-20 | End: 2024-05-23

## 2024-05-20 RX ORDER — TRAZODONE HYDROCHLORIDE 150 MG/1
TABLET ORAL
Qty: 90 TABLET | Refills: 1 | OUTPATIENT
Start: 2024-05-20

## 2024-05-20 RX ORDER — FLUOXETINE HYDROCHLORIDE 40 MG/1
CAPSULE ORAL
Qty: 90 CAPSULE | Refills: 1 | OUTPATIENT
Start: 2024-05-20

## 2024-05-20 RX ORDER — PANTOPRAZOLE SODIUM 40 MG/1
40 TABLET, DELAYED RELEASE ORAL DAILY
Qty: 90 TABLET | Refills: 1 | Status: SHIPPED | OUTPATIENT
Start: 2024-05-20

## 2024-05-23 ENCOUNTER — OFFICE VISIT (OUTPATIENT)
Dept: INTERNAL MEDICINE | Facility: CLINIC | Age: 64
End: 2024-05-23
Payer: MEDICARE

## 2024-05-23 VITALS
BODY MASS INDEX: 43.23 KG/M2 | TEMPERATURE: 97.8 F | SYSTOLIC BLOOD PRESSURE: 126 MMHG | OXYGEN SATURATION: 96 % | WEIGHT: 229 LBS | DIASTOLIC BLOOD PRESSURE: 82 MMHG | RESPIRATION RATE: 16 BRPM | HEIGHT: 61 IN | HEART RATE: 80 BPM

## 2024-05-23 DIAGNOSIS — Z13.0 SCREENING FOR BLOOD DISEASE: ICD-10-CM

## 2024-05-23 DIAGNOSIS — E55.9 VITAMIN D DEFICIENCY: ICD-10-CM

## 2024-05-23 DIAGNOSIS — N18.32 STAGE 3B CHRONIC KIDNEY DISEASE: ICD-10-CM

## 2024-05-23 DIAGNOSIS — Z13.29 THYROID DISORDER SCREENING: ICD-10-CM

## 2024-05-23 DIAGNOSIS — Z13.21 ENCOUNTER FOR VITAMIN DEFICIENCY SCREENING: ICD-10-CM

## 2024-05-23 DIAGNOSIS — F51.01 PRIMARY INSOMNIA: Primary | ICD-10-CM

## 2024-05-23 DIAGNOSIS — E11.65 TYPE 2 DIABETES MELLITUS WITH HYPERGLYCEMIA, WITH LONG-TERM CURRENT USE OF INSULIN: ICD-10-CM

## 2024-05-23 DIAGNOSIS — Z79.4 TYPE 2 DIABETES MELLITUS WITH HYPERGLYCEMIA, WITH LONG-TERM CURRENT USE OF INSULIN: ICD-10-CM

## 2024-05-23 DIAGNOSIS — E78.2 MIXED HYPERLIPIDEMIA: ICD-10-CM

## 2024-05-23 DIAGNOSIS — I10 ESSENTIAL HYPERTENSION: ICD-10-CM

## 2024-05-23 DIAGNOSIS — Z13.220 LIPID SCREENING: ICD-10-CM

## 2024-05-23 DIAGNOSIS — F41.8 DEPRESSION WITH ANXIETY: ICD-10-CM

## 2024-05-23 LAB
DEPRECATED RDW RBC AUTO: 42.5 FL (ref 37–54)
ERYTHROCYTE [DISTWIDTH] IN BLOOD BY AUTOMATED COUNT: 13.1 % (ref 12.3–15.4)
HBA1C MFR BLD: 7.5 % (ref 4.8–5.6)
HCT VFR BLD AUTO: 41.9 % (ref 34–46.6)
HGB BLD-MCNC: 13.5 G/DL (ref 12–15.9)
MCH RBC QN AUTO: 29.1 PG (ref 26.6–33)
MCHC RBC AUTO-ENTMCNC: 32.2 G/DL (ref 31.5–35.7)
MCV RBC AUTO: 90.3 FL (ref 79–97)
PLATELET # BLD AUTO: 253 10*3/MM3 (ref 140–450)
PMV BLD AUTO: 10.7 FL (ref 6–12)
RBC # BLD AUTO: 4.64 10*6/MM3 (ref 3.77–5.28)
WBC NRBC COR # BLD AUTO: 5.39 10*3/MM3 (ref 3.4–10.8)

## 2024-05-23 PROCEDURE — 80061 LIPID PANEL: CPT | Performed by: NURSE PRACTITIONER

## 2024-05-23 PROCEDURE — 1159F MED LIST DOCD IN RCRD: CPT | Performed by: NURSE PRACTITIONER

## 2024-05-23 PROCEDURE — 1126F AMNT PAIN NOTED NONE PRSNT: CPT | Performed by: NURSE PRACTITIONER

## 2024-05-23 PROCEDURE — 1160F RVW MEDS BY RX/DR IN RCRD: CPT | Performed by: NURSE PRACTITIONER

## 2024-05-23 PROCEDURE — 84443 ASSAY THYROID STIM HORMONE: CPT | Performed by: NURSE PRACTITIONER

## 2024-05-23 PROCEDURE — G2211 COMPLEX E/M VISIT ADD ON: HCPCS | Performed by: NURSE PRACTITIONER

## 2024-05-23 PROCEDURE — 3074F SYST BP LT 130 MM HG: CPT | Performed by: NURSE PRACTITIONER

## 2024-05-23 PROCEDURE — 85027 COMPLETE CBC AUTOMATED: CPT | Performed by: NURSE PRACTITIONER

## 2024-05-23 PROCEDURE — 82746 ASSAY OF FOLIC ACID SERUM: CPT | Performed by: NURSE PRACTITIONER

## 2024-05-23 PROCEDURE — 99214 OFFICE O/P EST MOD 30 MIN: CPT | Performed by: NURSE PRACTITIONER

## 2024-05-23 PROCEDURE — 3079F DIAST BP 80-89 MM HG: CPT | Performed by: NURSE PRACTITIONER

## 2024-05-23 PROCEDURE — 83036 HEMOGLOBIN GLYCOSYLATED A1C: CPT | Performed by: NURSE PRACTITIONER

## 2024-05-23 PROCEDURE — 80053 COMPREHEN METABOLIC PANEL: CPT | Performed by: NURSE PRACTITIONER

## 2024-05-23 PROCEDURE — 3051F HG A1C>EQUAL 7.0%<8.0%: CPT | Performed by: NURSE PRACTITIONER

## 2024-05-23 PROCEDURE — 82306 VITAMIN D 25 HYDROXY: CPT | Performed by: NURSE PRACTITIONER

## 2024-05-23 PROCEDURE — 82607 VITAMIN B-12: CPT | Performed by: NURSE PRACTITIONER

## 2024-05-23 RX ORDER — AMITRIPTYLINE HYDROCHLORIDE 25 MG/1
TABLET, FILM COATED ORAL
Qty: 45 TABLET | Refills: 1 | Status: SHIPPED | OUTPATIENT
Start: 2024-05-23

## 2024-05-23 RX ORDER — QUETIAPINE FUMARATE 100 MG/1
100 TABLET, FILM COATED ORAL NIGHTLY
Qty: 90 TABLET | Refills: 1 | Status: SHIPPED | OUTPATIENT
Start: 2024-05-23

## 2024-05-23 RX ORDER — METOPROLOL TARTRATE 50 MG/1
50 TABLET, FILM COATED ORAL 2 TIMES DAILY
Qty: 180 TABLET | Refills: 1 | Status: SHIPPED | OUTPATIENT
Start: 2024-05-23

## 2024-05-23 RX ORDER — LISINOPRIL 2.5 MG/1
2.5 TABLET ORAL DAILY
Qty: 90 TABLET | Refills: 1 | Status: SHIPPED | OUTPATIENT
Start: 2024-05-23

## 2024-05-23 RX ORDER — ISOSORBIDE MONONITRATE 60 MG/1
60 TABLET, EXTENDED RELEASE ORAL EVERY MORNING
Qty: 90 TABLET | Refills: 2 | Status: SHIPPED | OUTPATIENT
Start: 2024-05-23

## 2024-05-23 NOTE — PROGRESS NOTES
Subjective   Chief Complaint   Patient presents with    Follow-up     1 month follow up for chronic medical conditions      Jayna Ambrocio is a 63 y.o. female.     The patient is a 63-year-old female who presents for a 1-month follow-up. Last laboratory work revealed Kidney disease with creatinine 1.42 md/dL and GFR 41.1. Her last lipid panel was normal. Her last hemoglobin A1c was 7.8 % a month ago.     She discontinued the use of fluoxetine and Wellbutrin due to perceived ineffectiveness. Her symptoms of depression and anxiety have remained consistent. She experiences nervousness, depression, and sadness. She anticipates an improvement in her anxiety and depression symptoms following the initiation of sleep medication. She does not recall any previous trials of Zoloft or Lexapro.    She has poor sleep quality. She was previously on a regimen of trazodone 150 mg and seroquel 100mg. The trazodone did not work well but seroquel helped some.     She acknowledges the need to schedule a follow-up appointment with her nephrologist.    During her last visit in 04/2024, no changes were made to her medication regimen. However, she was advised to use a continuous glucose monitor, which she has yet to receive. She reports difficulty in obtaining her weekly medications. In 04/2024, she was prescribed Bydureon on a weekly basis. However, Trulicity has not been refilled due to a shortage.    She saw Jayna Elizondo APRN for shoulder and knee pain, who suggested physical therapy. She has rescheduled her physical therapy appointment on 06/03/2024. She saw her cardiologist in 04/2024. She has been having headaches. She is not sure if trazodone is causing her headaches. She has not tried amitriptyline.            I have reviewed the following portions of the patient's history and confirmed they are accurate: allergies, current medications, past family history, past medical history, past social history, past surgical  history, and problem list    Review of Systems  Pertinent items are noted in HPI.     Current Outpatient Medications on File Prior to Visit   Medication Sig    ACCU-CHEK SOFTCLIX LANCETS lancets USE TO TEST BLOOD GLUCOSE THREE TIMES DAILY AS DIRECTED    Aspirin 81 MG capsule Take 1 tablet by mouth Daily.    B-D UF III MINI PEN NEEDLES 31G X 5 MM misc Use with insulin twice a day    B-D ULTRAFINE III SHORT PEN 31G X 8 MM misc Inject 1 unit marking on U-100 syringe as directed 2 (Two) Times a Day.    Blood Glucose Monitoring Suppl (ACCU-CHEK MARIO PLUS) w/Device kit use to test blood glucose three times daily    Cholecalciferol (VITAMIN D-3) 25 MCG (1000 UT) capsule Take 1,000 Units by mouth Daily.    clopidogrel (PLAVIX) 75 MG tablet TAKE 1 TABLET BY MOUTH DAILY. PATIENT MUST KEEP APPOINTMENT FOR FURTHER REFILLS.    glucose blood (OneTouch Verio) test strip TEST THREE TIMES A DAY E11.9    Insulin Lispro Prot & Lispro (HumaLOG Mix 75/25 KwikPen) (75-25) 100 UNIT/ML suspension pen-injector pen Inject 20 Units under the skin into the appropriate area as directed 2 (Two) Times a Day.    Jardiance 10 MG tablet tablet Take 1 tablet by mouth Daily.    methocarbamol (ROBAXIN) 500 MG tablet Take 1 tablet by mouth 3 (Three) Times a Day As Needed for Muscle Spasms.    pantoprazole (PROTONIX) 40 MG EC tablet TAKE 1 TABLET BY MOUTH EVERY DAY    rosuvastatin (CRESTOR) 40 MG tablet Take 1 tablet by mouth Daily. 200001    Specialty Vitamins Products (HEALTHY HEART PO) Take 400 unit marking on U-100 syringe by mouth Daily.    [DISCONTINUED] Dulaglutide 0.75 MG/0.5ML solution pen-injector Inject 0.75 mg under the skin into the appropriate area as directed 1 (One) Time Per Week.    [DISCONTINUED] isosorbide mononitrate (IMDUR) 60 MG 24 hr tablet Take 1 tablet by mouth Every Morning.    [DISCONTINUED] lisinopril (PRINIVIL,ZESTRIL) 2.5 MG tablet Take 1 tablet by mouth Daily.    [DISCONTINUED] metoprolol tartrate (LOPRESSOR) 50 MG tablet  "Take 1 tablet by mouth 2 (Two) Times a Day.    [DISCONTINUED] traZODone (DESYREL) 50 MG tablet Take 1 tablet by mouth Every Night.    [DISCONTINUED] buPROPion XL (WELLBUTRIN XL) 300 MG 24 hr tablet TAKE 1 TABLET BY MOUTH DAILY. NEEDS APPT FOR FURTHER REFILLS. (Patient not taking: Reported on 5/23/2024)    [DISCONTINUED] FLUoxetine (PROzac) 20 MG capsule TAKE 3 CAPSULES BY MOUTH EVERY DAY (Patient not taking: Reported on 5/23/2024)     No current facility-administered medications on file prior to visit.       Objective   Vitals:    05/23/24 1036   BP: 126/82   BP Location: Left arm   Patient Position: Sitting   Cuff Size: Adult   Pulse: 80   Resp: 16   Temp: 97.8 °F (36.6 °C)   TempSrc: Infrared   SpO2: 96%   Weight: 104 kg (229 lb)   Height: 154.9 cm (61\")     Body mass index is 43.27 kg/m².    Physical Exam  Vitals reviewed.   Constitutional:       Appearance: Normal appearance. She is well-developed.   HENT:      Head: Normocephalic and atraumatic.      Nose: Nose normal.   Eyes:      General: Lids are normal.      Conjunctiva/sclera: Conjunctivae normal.      Pupils: Pupils are equal, round, and reactive to light.   Neck:      Thyroid: No thyromegaly.      Trachea: Trachea normal.   Cardiovascular:      Rate and Rhythm: Normal rate and regular rhythm.      Heart sounds: Normal heart sounds.   Pulmonary:      Effort: Pulmonary effort is normal. No respiratory distress.      Breath sounds: Normal breath sounds.   Skin:     General: Skin is warm and dry.   Neurological:      Mental Status: She is alert and oriented to person, place, and time.      GCS: GCS eye subscore is 4. GCS verbal subscore is 5. GCS motor subscore is 6.   Psychiatric:         Attention and Perception: Attention normal.         Mood and Affect: Mood normal.         Speech: Speech normal.         Behavior: Behavior normal. Behavior is cooperative.         Thought Content: Thought content normal.         Assessment & Plan   Problem List Items " Addressed This Visit       Type 2 diabetes mellitus    Relevant Medications    Dulaglutide 0.75 MG/0.5ML solution pen-injector    Other Relevant Orders    CBC (No Diff)    Comprehensive Metabolic Panel    Hemoglobin A1c    Essential hypertension    Relevant Medications    isosorbide mononitrate (IMDUR) 60 MG 24 hr tablet    metoprolol tartrate (LOPRESSOR) 50 MG tablet    lisinopril (PRINIVIL,ZESTRIL) 2.5 MG tablet    Insomnia - Primary    Relevant Medications    QUEtiapine (SEROquel) 100 MG tablet    amitriptyline (ELAVIL) 25 MG tablet     Other Visit Diagnoses       Depression with anxiety        Relevant Medications    QUEtiapine (SEROquel) 100 MG tablet    sertraline (Zoloft) 50 MG tablet    amitriptyline (ELAVIL) 25 MG tablet    Mixed hyperlipidemia        Relevant Orders    Lipid Panel    Stage 3b chronic kidney disease        Screening for blood disease        Relevant Orders    CBC (No Diff)    Comprehensive Metabolic Panel    Lipid Panel    Hemoglobin A1c    TSH Rfx On Abnormal To Free T4    Vitamin B12 & Folate    Vitamin D,25-Hydroxy    Thyroid disorder screening        Relevant Orders    TSH Rfx On Abnormal To Free T4    Lipid screening        Relevant Orders    Lipid Panel    Encounter for vitamin deficiency screening        Relevant Orders    Vitamin B12 & Folate    Vitamin D,25-Hydroxy    Vitamin D deficiency        Relevant Orders    Vitamin D,25-Hydroxy               Current Outpatient Medications:     ACCU-CHEK SOFTCLIX LANCETS lancets, USE TO TEST BLOOD GLUCOSE THREE TIMES DAILY AS DIRECTED, Disp: , Rfl: 5    Aspirin 81 MG capsule, Take 1 tablet by mouth Daily., Disp: 90 capsule, Rfl: 1    B-D UF III MINI PEN NEEDLES 31G X 5 MM misc, Use with insulin twice a day, Disp: 60 each, Rfl: 11    B-D ULTRAFINE III SHORT PEN 31G X 8 MM misc, Inject 1 unit marking on U-100 syringe as directed 2 (Two) Times a Day., Disp: 90 each, Rfl: 2    Blood Glucose Monitoring Suppl (ACCU-CHEK MARIO PLUS) w/Device kit,  use to test blood glucose three times daily, Disp: , Rfl: 5    Cholecalciferol (VITAMIN D-3) 25 MCG (1000 UT) capsule, Take 1,000 Units by mouth Daily., Disp: , Rfl:     clopidogrel (PLAVIX) 75 MG tablet, TAKE 1 TABLET BY MOUTH DAILY. PATIENT MUST KEEP APPOINTMENT FOR FURTHER REFILLS., Disp: 90 tablet, Rfl: 1    Dulaglutide 0.75 MG/0.5ML solution pen-injector, Inject 0.75 mg under the skin into the appropriate area as directed 1 (One) Time Per Week., Disp: 2 mL, Rfl: 1    glucose blood (OneTouch Verio) test strip, TEST THREE TIMES A DAY E11.9, Disp: 100 each, Rfl: 1    Insulin Lispro Prot & Lispro (HumaLOG Mix 75/25 KwikPen) (75-25) 100 UNIT/ML suspension pen-injector pen, Inject 20 Units under the skin into the appropriate area as directed 2 (Two) Times a Day., Disp: 5 pen, Rfl: 3    isosorbide mononitrate (IMDUR) 60 MG 24 hr tablet, Take 1 tablet by mouth Every Morning., Disp: 90 tablet, Rfl: 2    Jardiance 10 MG tablet tablet, Take 1 tablet by mouth Daily., Disp: , Rfl:     lisinopril (PRINIVIL,ZESTRIL) 2.5 MG tablet, Take 1 tablet by mouth Daily., Disp: 90 tablet, Rfl: 1    methocarbamol (ROBAXIN) 500 MG tablet, Take 1 tablet by mouth 3 (Three) Times a Day As Needed for Muscle Spasms., Disp: 30 tablet, Rfl: 2    metoprolol tartrate (LOPRESSOR) 50 MG tablet, Take 1 tablet by mouth 2 (Two) Times a Day., Disp: 180 tablet, Rfl: 1    pantoprazole (PROTONIX) 40 MG EC tablet, TAKE 1 TABLET BY MOUTH EVERY DAY, Disp: 90 tablet, Rfl: 1    rosuvastatin (CRESTOR) 40 MG tablet, Take 1 tablet by mouth Daily. 200001, Disp: 30 tablet, Rfl: 5    Specialty Vitamins Products (HEALTHY HEART PO), Take 400 unit marking on U-100 syringe by mouth Daily., Disp: , Rfl:     amitriptyline (ELAVIL) 25 MG tablet, Take 1-2 tablets by mouth 30 minutes before bedtime as needed for sleep., Disp: 45 tablet, Rfl: 1    QUEtiapine (SEROquel) 100 MG tablet, Take 1 tablet by mouth Every Night., Disp: 90 tablet, Rfl: 1    sertraline (Zoloft) 50 MG  tablet, Take 1 tablet by mouth Daily., Disp: 30 tablet, Rfl: 1       1. Insomnia.  - This is chronic unstable.   - She will start Seroquel and amitriptyline.    2. Depression with anxiety.  - This is chronic unstable.  - Zoloft therapy will be initiated.    3. Hypertension.  - chronic stable  - She will continue with lisinopril and metoprolol.   - She will adhere to a heart-healthy, low-cholesterol, and fiber diet.   - A CMP and fasting lipid panel will be conducted.    4. Mixed hyperlipidemia.  - This is chronic stable.   - The patient will continue with Crestor.   - She will adhere to a heart- healthy, low-cholesterol, and high-fiber diet.   - A fasting lipid panel and CMP will be conducted.    5. Type 2 diabetes.  - This is chronic unstable.  - The patient will continue her current regimen of Jardiance, Humalog mix, and Trulicity.   - However, the patient has been unable to obtain Trulicity from the pharmacy due to shortage.   - This prescription will be sent to the pharmacy to better manage her blood glucose.    6. Stage 3 chronic kidney disease.  - This is chronic unstable.   - A CMP will be conducted.   - The patient will maintain adequate hydration and refrain from NSAIDs.   - A new referral to nephrology will be completed.    Follow-up  The patient is scheduled for a follow-up visit in 1 month.    Plan of care reviewed with the patient at the conclusion of today's visit.  Education was provided regarding diagnosis, management, and any prescribed or recommended OTC medications.  Patient verbalized understanding of and agreement with management plan.     Return in about 1 month (around 6/23/2024), or if symptoms worsen or fail to improve, for Follow-up.      ROCCO Monroy      Transcribed from ambient dictation for ROCCO Monroy by Niyah Lin.  05/23/24   11:38 EDT    Patient or patient representative verbalized consent to the visit recording.  I have personally performed the  services described in this document as transcribed by the above individual, and it is both accurate and complete.

## 2024-05-24 LAB
25(OH)D3 SERPL-MCNC: 41.9 NG/ML (ref 30–100)
ALBUMIN SERPL-MCNC: 4.3 G/DL (ref 3.5–5.2)
ALBUMIN/GLOB SERPL: 1.6 G/DL
ALP SERPL-CCNC: 65 U/L (ref 39–117)
ALT SERPL W P-5'-P-CCNC: 14 U/L (ref 1–33)
ANION GAP SERPL CALCULATED.3IONS-SCNC: 13 MMOL/L (ref 5–15)
AST SERPL-CCNC: 18 U/L (ref 1–32)
BILIRUB SERPL-MCNC: 0.3 MG/DL (ref 0–1.2)
BUN SERPL-MCNC: 15 MG/DL (ref 8–23)
BUN/CREAT SERPL: 11.8 (ref 7–25)
CALCIUM SPEC-SCNC: 9.5 MG/DL (ref 8.6–10.5)
CHLORIDE SERPL-SCNC: 105 MMOL/L (ref 98–107)
CHOLEST SERPL-MCNC: 127 MG/DL (ref 0–200)
CO2 SERPL-SCNC: 23 MMOL/L (ref 22–29)
CREAT SERPL-MCNC: 1.27 MG/DL (ref 0.57–1)
EGFRCR SERPLBLD CKD-EPI 2021: 47.3 ML/MIN/1.73
FOLATE SERPL-MCNC: 16 NG/ML (ref 4.78–24.2)
GLOBULIN UR ELPH-MCNC: 2.7 GM/DL
GLUCOSE SERPL-MCNC: 54 MG/DL (ref 65–99)
HDLC SERPL-MCNC: 62 MG/DL (ref 40–60)
LDLC SERPL CALC-MCNC: 46 MG/DL (ref 0–100)
LDLC/HDLC SERPL: 0.7 {RATIO}
POTASSIUM SERPL-SCNC: 4.4 MMOL/L (ref 3.5–5.2)
PROT SERPL-MCNC: 7 G/DL (ref 6–8.5)
SODIUM SERPL-SCNC: 141 MMOL/L (ref 136–145)
TRIGL SERPL-MCNC: 107 MG/DL (ref 0–150)
TSH SERPL DL<=0.05 MIU/L-ACNC: 1.7 UIU/ML (ref 0.27–4.2)
VIT B12 BLD-MCNC: 283 PG/ML (ref 211–946)
VLDLC SERPL-MCNC: 19 MG/DL (ref 5–40)

## 2024-05-30 ENCOUNTER — TELEPHONE (OUTPATIENT)
Dept: INTERNAL MEDICINE | Facility: CLINIC | Age: 64
End: 2024-05-30
Payer: MEDICARE

## 2024-06-09 RX ORDER — CYANOCOBALAMIN (VITAMIN B-12) 1000 MCG
1 TABLET ORAL DAILY
Qty: 30 TABLET | Refills: 2 | Status: SHIPPED | OUTPATIENT
Start: 2024-06-09

## 2024-06-09 RX ORDER — DIPHENOXYLATE HYDROCHLORIDE AND ATROPINE SULFATE 2.5; .025 MG/1; MG/1
1 TABLET ORAL DAILY
Qty: 30 TABLET | Refills: 11 | Status: SHIPPED | OUTPATIENT
Start: 2024-06-09

## 2024-07-11 ENCOUNTER — OFFICE VISIT (OUTPATIENT)
Dept: INTERNAL MEDICINE | Facility: CLINIC | Age: 64
End: 2024-07-11
Payer: MEDICARE

## 2024-07-11 VITALS
BODY MASS INDEX: 43.61 KG/M2 | OXYGEN SATURATION: 94 % | HEIGHT: 61 IN | TEMPERATURE: 97 F | DIASTOLIC BLOOD PRESSURE: 76 MMHG | HEART RATE: 88 BPM | SYSTOLIC BLOOD PRESSURE: 128 MMHG | WEIGHT: 231 LBS

## 2024-07-11 DIAGNOSIS — I73.9 PERIPHERAL VASCULAR DISEASE: ICD-10-CM

## 2024-07-11 DIAGNOSIS — G89.29 CHRONIC PAIN OF LEFT KNEE: ICD-10-CM

## 2024-07-11 DIAGNOSIS — G89.29 CHRONIC LEFT SHOULDER PAIN: ICD-10-CM

## 2024-07-11 DIAGNOSIS — M62.838 MUSCLE SPASM: ICD-10-CM

## 2024-07-11 DIAGNOSIS — M25.562 CHRONIC PAIN OF LEFT KNEE: ICD-10-CM

## 2024-07-11 DIAGNOSIS — Z79.4 TYPE 2 DIABETES MELLITUS WITH HYPERGLYCEMIA, WITH LONG-TERM CURRENT USE OF INSULIN: ICD-10-CM

## 2024-07-11 DIAGNOSIS — I10 ESSENTIAL HYPERTENSION: ICD-10-CM

## 2024-07-11 DIAGNOSIS — F51.01 PRIMARY INSOMNIA: Primary | ICD-10-CM

## 2024-07-11 DIAGNOSIS — M25.512 CHRONIC LEFT SHOULDER PAIN: ICD-10-CM

## 2024-07-11 DIAGNOSIS — F41.8 DEPRESSION WITH ANXIETY: ICD-10-CM

## 2024-07-11 DIAGNOSIS — M25.462 EFFUSION OF LEFT KNEE: ICD-10-CM

## 2024-07-11 DIAGNOSIS — E11.65 TYPE 2 DIABETES MELLITUS WITH HYPERGLYCEMIA, WITH LONG-TERM CURRENT USE OF INSULIN: ICD-10-CM

## 2024-07-11 PROCEDURE — 3051F HG A1C>EQUAL 7.0%<8.0%: CPT | Performed by: NURSE PRACTITIONER

## 2024-07-11 PROCEDURE — G2211 COMPLEX E/M VISIT ADD ON: HCPCS | Performed by: NURSE PRACTITIONER

## 2024-07-11 PROCEDURE — 3078F DIAST BP <80 MM HG: CPT | Performed by: NURSE PRACTITIONER

## 2024-07-11 PROCEDURE — 3074F SYST BP LT 130 MM HG: CPT | Performed by: NURSE PRACTITIONER

## 2024-07-11 PROCEDURE — 99214 OFFICE O/P EST MOD 30 MIN: CPT | Performed by: NURSE PRACTITIONER

## 2024-07-11 PROCEDURE — 1126F AMNT PAIN NOTED NONE PRSNT: CPT | Performed by: NURSE PRACTITIONER

## 2024-07-11 RX ORDER — TRAZODONE HYDROCHLORIDE 50 MG/1
50 TABLET ORAL NIGHTLY
COMMUNITY
End: 2024-07-11

## 2024-07-11 RX ORDER — METHOCARBAMOL 500 MG/1
500 TABLET, FILM COATED ORAL 3 TIMES DAILY PRN
Qty: 90 TABLET | Refills: 1 | Status: SHIPPED | OUTPATIENT
Start: 2024-07-11

## 2024-07-11 RX ORDER — SERTRALINE HYDROCHLORIDE 100 MG/1
100 TABLET, FILM COATED ORAL DAILY
Qty: 90 TABLET | Refills: 1 | Status: SHIPPED | OUTPATIENT
Start: 2024-07-11

## 2024-07-11 RX ORDER — TRAZODONE HYDROCHLORIDE 150 MG/1
150 TABLET ORAL NIGHTLY
Qty: 90 TABLET | Refills: 1 | Status: SHIPPED | OUTPATIENT
Start: 2024-07-11

## 2024-07-11 NOTE — PROGRESS NOTES
Subjective   Chief Complaint   Patient presents with    Primary insomnia     1 month follow-up        Jayna Ambrocio is a 63 y.o. female.    History of Present Illness  The patient presents for evaluation of multiple medical concerns.    The patient continues her regimen of trazodone, initially at a dosage of 150 mg, which was subsequently increased to 50 mg. Despite taking three capsules before bedtime, she reports no noticeable improvement. Consequently, she was initiated on amitriptyline. She continues to take Seroquel and believes the trazodone is more effective than amitriptyline.    The patient continues her daily regimen of Zoloft, which she reports as ineffective in managing her depression and anxiety. She denies any adverse effects from the medication.    The patient continues to take Robaxin, which she reports as effective in managing her back pain.    The patient has been experiencing pain in her shoulder and knee for an extended period, with the onset of pain commencing this year. She reports difficulty in ambulation due to the pain.    I have reviewed the following portions of the patient's history and confirmed they are accurate: allergies, current medications, past family history, past medical history, past social history, past surgical history, and problem list    Review of Systems  Pertinent items are noted in HPI.     Current Outpatient Medications on File Prior to Visit   Medication Sig    ACCU-CHEK SOFTCLIX LANCETS lancets USE TO TEST BLOOD GLUCOSE THREE TIMES DAILY AS DIRECTED    Aspirin 81 MG capsule Take 1 tablet by mouth Daily.    B-D UF III MINI PEN NEEDLES 31G X 5 MM misc Use with insulin twice a day    B-D ULTRAFINE III SHORT PEN 31G X 8 MM misc Inject 1 unit marking on U-100 syringe as directed 2 (Two) Times a Day.    Blood Glucose Monitoring Suppl (ACCU-CHEK MARIO PLUS) w/Device kit use to test blood glucose three times daily    Cholecalciferol (VITAMIN D-3) 25 MCG (1000 UT) capsule  Take 1,000 Units by mouth Daily.    Cyanocobalamin (B-12) 1000 MCG tablet Take 1 tablet by mouth Daily.    Dulaglutide 0.75 MG/0.5ML solution pen-injector Inject 0.75 mg under the skin into the appropriate area as directed 1 (One) Time Per Week.    glucose blood (OneTouch Verio) test strip TEST THREE TIMES A DAY E11.9    Insulin Lispro Prot & Lispro (HumaLOG Mix 75/25 KwikPen) (75-25) 100 UNIT/ML suspension pen-injector pen Inject 20 Units under the skin into the appropriate area as directed 2 (Two) Times a Day.    isosorbide mononitrate (IMDUR) 60 MG 24 hr tablet Take 1 tablet by mouth Every Morning.    Jardiance 10 MG tablet tablet Take 1 tablet by mouth Daily.    lisinopril (PRINIVIL,ZESTRIL) 2.5 MG tablet Take 1 tablet by mouth Daily.    metoprolol tartrate (LOPRESSOR) 50 MG tablet Take 1 tablet by mouth 2 (Two) Times a Day.    multivitamin (Multiple Vitamin) tablet tablet Take 1 tablet by mouth Daily.    pantoprazole (PROTONIX) 40 MG EC tablet TAKE 1 TABLET BY MOUTH EVERY DAY    QUEtiapine (SEROquel) 100 MG tablet Take 1 tablet by mouth Every Night.    rosuvastatin (CRESTOR) 40 MG tablet Take 1 tablet by mouth Daily. 200001    Specialty Vitamins Products (HEALTHY HEART PO) Take 400 unit marking on U-100 syringe by mouth Daily.    [DISCONTINUED] amitriptyline (ELAVIL) 25 MG tablet Take 1-2 tablets by mouth 30 minutes before bedtime as needed for sleep.    [DISCONTINUED] methocarbamol (ROBAXIN) 500 MG tablet Take 1 tablet by mouth 3 (Three) Times a Day As Needed for Muscle Spasms.    [DISCONTINUED] sertraline (Zoloft) 50 MG tablet Take 1 tablet by mouth Daily.    [DISCONTINUED] traZODone (DESYREL) 50 MG tablet Take 1 tablet by mouth Every Night.    clopidogrel (PLAVIX) 75 MG tablet TAKE 1 TABLET BY MOUTH DAILY. PATIENT MUST KEEP APPOINTMENT FOR FURTHER REFILLS. (Patient not taking: Reported on 7/11/2024)     No current facility-administered medications on file prior to visit.       Objective   Vitals:    07/11/24  "0759   BP: 128/76   Pulse: 88   Temp: 97 °F (36.1 °C)   TempSrc: Temporal   SpO2: 94%   Weight: 105 kg (231 lb)   Height: 154.9 cm (61\")     Body mass index is 43.65 kg/m².    Physical Exam  Vitals reviewed.   Constitutional:       Appearance: Normal appearance. She is well-developed.   HENT:      Head: Normocephalic and atraumatic.      Nose: Nose normal.   Eyes:      General: Lids are normal.      Conjunctiva/sclera: Conjunctivae normal.      Pupils: Pupils are equal, round, and reactive to light.   Neck:      Thyroid: No thyromegaly.      Trachea: Trachea normal.   Cardiovascular:      Rate and Rhythm: Normal rate and regular rhythm.      Heart sounds: Normal heart sounds.   Pulmonary:      Effort: Pulmonary effort is normal. No respiratory distress.      Breath sounds: Normal breath sounds.   Musculoskeletal:      Left shoulder: Tenderness present.      Left knee: Swelling present. Tenderness present.   Skin:     General: Skin is warm and dry.   Neurological:      Mental Status: She is alert and oriented to person, place, and time.      GCS: GCS eye subscore is 4. GCS verbal subscore is 5. GCS motor subscore is 6.   Psychiatric:         Attention and Perception: Attention normal.         Mood and Affect: Mood normal.         Speech: Speech normal.         Behavior: Behavior normal. Behavior is cooperative.         Thought Content: Thought content normal.         Results  Laboratory Studies  Vitamin D levels are normal. Blood counts are normal, no anemia. Kidney function is still on the lower end. Cholesterol levels are normal. Thyroid levels are normal. B12 level is slightly low.    Assessment & Plan   Problem List Items Addressed This Visit       Type 2 diabetes mellitus    Essential hypertension    Insomnia - Primary     Other Visit Diagnoses       Depression with anxiety        Relevant Medications    traZODone (DESYREL) 150 MG tablet    sertraline (Zoloft) 100 MG tablet    Chronic pain of left knee        " Relevant Medications    methocarbamol (ROBAXIN) 500 MG tablet    Other Relevant Orders    Ambulatory Referral to Physical Therapy for Evaluation & Treatment (Completed)    XR Knee 3 View Left    Muscle spasm        Relevant Medications    methocarbamol (ROBAXIN) 500 MG tablet    Chronic left shoulder pain        Relevant Medications    methocarbamol (ROBAXIN) 500 MG tablet    Other Relevant Orders    Ambulatory Referral to Physical Therapy for Evaluation & Treatment (Completed)               Current Outpatient Medications:     ACCU-CHEK SOFTCLIX LANCETS lancets, USE TO TEST BLOOD GLUCOSE THREE TIMES DAILY AS DIRECTED, Disp: , Rfl: 5    Aspirin 81 MG capsule, Take 1 tablet by mouth Daily., Disp: 90 capsule, Rfl: 1    B-D UF III MINI PEN NEEDLES 31G X 5 MM misc, Use with insulin twice a day, Disp: 60 each, Rfl: 11    B-D ULTRAFINE III SHORT PEN 31G X 8 MM misc, Inject 1 unit marking on U-100 syringe as directed 2 (Two) Times a Day., Disp: 90 each, Rfl: 2    Blood Glucose Monitoring Suppl (ACCU-CHEK MARIO PLUS) w/Device kit, use to test blood glucose three times daily, Disp: , Rfl: 5    Cholecalciferol (VITAMIN D-3) 25 MCG (1000 UT) capsule, Take 1,000 Units by mouth Daily., Disp: , Rfl:     Cyanocobalamin (B-12) 1000 MCG tablet, Take 1 tablet by mouth Daily., Disp: 30 tablet, Rfl: 2    Dulaglutide 0.75 MG/0.5ML solution pen-injector, Inject 0.75 mg under the skin into the appropriate area as directed 1 (One) Time Per Week., Disp: 2 mL, Rfl: 1    glucose blood (OneTouch Verio) test strip, TEST THREE TIMES A DAY E11.9, Disp: 100 each, Rfl: 1    Insulin Lispro Prot & Lispro (HumaLOG Mix 75/25 KwikPen) (75-25) 100 UNIT/ML suspension pen-injector pen, Inject 20 Units under the skin into the appropriate area as directed 2 (Two) Times a Day., Disp: 5 pen, Rfl: 3    isosorbide mononitrate (IMDUR) 60 MG 24 hr tablet, Take 1 tablet by mouth Every Morning., Disp: 90 tablet, Rfl: 2    Jardiance 10 MG tablet tablet, Take 1 tablet  by mouth Daily., Disp: , Rfl:     lisinopril (PRINIVIL,ZESTRIL) 2.5 MG tablet, Take 1 tablet by mouth Daily., Disp: 90 tablet, Rfl: 1    methocarbamol (ROBAXIN) 500 MG tablet, Take 1 tablet by mouth 3 (Three) Times a Day As Needed for Muscle Spasms., Disp: 90 tablet, Rfl: 1    metoprolol tartrate (LOPRESSOR) 50 MG tablet, Take 1 tablet by mouth 2 (Two) Times a Day., Disp: 180 tablet, Rfl: 1    multivitamin (Multiple Vitamin) tablet tablet, Take 1 tablet by mouth Daily., Disp: 30 tablet, Rfl: 11    pantoprazole (PROTONIX) 40 MG EC tablet, TAKE 1 TABLET BY MOUTH EVERY DAY, Disp: 90 tablet, Rfl: 1    QUEtiapine (SEROquel) 100 MG tablet, Take 1 tablet by mouth Every Night., Disp: 90 tablet, Rfl: 1    rosuvastatin (CRESTOR) 40 MG tablet, Take 1 tablet by mouth Daily. 200001, Disp: 30 tablet, Rfl: 5    Specialty Vitamins Products (HEALTHY HEART PO), Take 400 unit marking on U-100 syringe by mouth Daily., Disp: , Rfl:     clopidogrel (PLAVIX) 75 MG tablet, TAKE 1 TABLET BY MOUTH DAILY. PATIENT MUST KEEP APPOINTMENT FOR FURTHER REFILLS. (Patient not taking: Reported on 7/11/2024), Disp: 90 tablet, Rfl: 1    sertraline (Zoloft) 100 MG tablet, Take 1 tablet by mouth Daily., Disp: 90 tablet, Rfl: 1    traZODone (DESYREL) 150 MG tablet, Take 1 tablet by mouth Every Night., Disp: 90 tablet, Rfl: 1    Assessment & Plan  1. Insomnia. Chronic unstable  The dosage of trazodone will be increased, while the continuation of Seroquel is advised.    2. Depression with anxiety. Chronic unstable  Zoloft will be increased, while trazodone will be increased for sleep. Seroquel will be continued for sleep.    3. Chronic pain of left knee. Chronic unstable  A referral to physical therapy will be made. An x-ray has been ordered. The patient is advised to continue with Tylenol or ibuprofen as needed for pain.    4. Hypertension. Chronic stable.   The patient is advised to continue with lisinopril and metoprolol. A heart-healthy, low-cholesterol,  high-fiber diet is recommended. Fasting lipid panel and CMP will be rechecked in 3 months.    5. Type 2 diabetes. Chronic unstable.   The patient will continue with Jardiance, Trulicity, Humalog Mix 75/25. A repeat A1c will be scheduled in 2 months. The patient is advised to follow a diabetic diet.    6. Chronic left shoulder pain and muscle spasm. Chronic unstable.   The patient is advised to continue with Robaxin. A referral to physical therapy will be made.         Plan of care reviewed with the patient at the conclusion of today's visit.  Education was provided regarding diagnosis, management, and any prescribed or recommended OTC medications.  Patient verbalized understanding of and agreement with management plan.     Return in about 2 months (around 9/11/2024), or if symptoms worsen or fail to improve, for Follow-up.      Transcribed from ambient dictation for ROCCO Monroy by ROCCO Monroy.  07/11/24   08:38 EDT    Patient or patient representative verbalized consent for the use of Ambient Listening during the visit with  ROCCO Monroy for chart documentation. 7/28/2024  15:21 EDT

## 2024-07-19 DIAGNOSIS — F51.01 PRIMARY INSOMNIA: ICD-10-CM

## 2024-07-19 RX ORDER — AMITRIPTYLINE HYDROCHLORIDE 25 MG/1
TABLET, FILM COATED ORAL
Qty: 45 TABLET | Refills: 1 | OUTPATIENT
Start: 2024-07-19

## 2024-07-24 ENCOUNTER — HOSPITAL ENCOUNTER (OUTPATIENT)
Dept: GENERAL RADIOLOGY | Facility: HOSPITAL | Age: 64
Discharge: HOME | End: 2024-07-24
Admitting: NURSE PRACTITIONER
Payer: MEDICARE

## 2024-07-24 DIAGNOSIS — M25.571 ACUTE RIGHT ANKLE PAIN: ICD-10-CM

## 2024-07-24 DIAGNOSIS — G89.29 CHRONIC PAIN OF LEFT KNEE: ICD-10-CM

## 2024-07-24 DIAGNOSIS — F41.8 DEPRESSION WITH ANXIETY: ICD-10-CM

## 2024-07-24 DIAGNOSIS — M79.674 TOE PAIN, RIGHT: ICD-10-CM

## 2024-07-24 DIAGNOSIS — M25.562 CHRONIC PAIN OF LEFT KNEE: ICD-10-CM

## 2024-07-24 PROCEDURE — 73562 X-RAY EXAM OF KNEE 3: CPT

## 2024-07-24 PROCEDURE — 73610 X-RAY EXAM OF ANKLE: CPT

## 2024-07-24 PROCEDURE — 73630 X-RAY EXAM OF FOOT: CPT

## 2024-08-02 ENCOUNTER — TELEPHONE (OUTPATIENT)
Dept: INTERNAL MEDICINE | Facility: CLINIC | Age: 64
End: 2024-08-02

## 2024-08-02 NOTE — TELEPHONE ENCOUNTER
Caller: Jayna Ambrocio    Relationship: Self    Best call back number:[phone]     Caller requesting test results: SELF    What test was performed: X-RAY LEFT KNEE, LEFT FOOT AND ANKLE  When was the test performed: 7/24/24    Where was the test performed: [facility]    Additional notes: PATIENT WOULD LIKE TO DISCUSS THE RESULTS OF THE TESTS.

## 2024-08-07 DIAGNOSIS — G47.09 OTHER INSOMNIA: ICD-10-CM

## 2024-08-07 DIAGNOSIS — F51.01 PRIMARY INSOMNIA: ICD-10-CM

## 2024-08-08 RX ORDER — QUETIAPINE FUMARATE 50 MG/1
50 TABLET, FILM COATED ORAL
Qty: 90 TABLET | Refills: 3 | Status: SHIPPED | OUTPATIENT
Start: 2024-08-08

## 2024-08-08 RX ORDER — AMITRIPTYLINE HYDROCHLORIDE 25 MG/1
TABLET, FILM COATED ORAL
Qty: 45 TABLET | Refills: 3 | Status: SHIPPED | OUTPATIENT
Start: 2024-08-08

## 2024-08-17 DIAGNOSIS — F41.8 DEPRESSION WITH ANXIETY: ICD-10-CM

## 2024-08-17 DIAGNOSIS — I10 ESSENTIAL HYPERTENSION: ICD-10-CM

## 2024-08-17 DIAGNOSIS — F51.01 PRIMARY INSOMNIA: ICD-10-CM

## 2024-08-19 ENCOUNTER — TELEPHONE (OUTPATIENT)
Dept: INTERNAL MEDICINE | Facility: CLINIC | Age: 64
End: 2024-08-19
Payer: MEDICARE

## 2024-08-19 RX ORDER — LISINOPRIL 2.5 MG/1
2.5 TABLET ORAL DAILY
Qty: 90 TABLET | Refills: 3 | Status: SHIPPED | OUTPATIENT
Start: 2024-08-19

## 2024-08-19 RX ORDER — QUETIAPINE FUMARATE 100 MG/1
100 TABLET, FILM COATED ORAL
Qty: 90 TABLET | Refills: 2 | Status: SHIPPED | OUTPATIENT
Start: 2024-08-19

## 2024-08-19 NOTE — TELEPHONE ENCOUNTER
Caller: Jayna Ambrocio    Relationship: Self    Best call back number:       [phone] (Home)     What is the best time to reach you:     ANY TIME    Who are you requesting to speak with (clinical staff, provider,  specific staff member):     BASSAM NO OR NURSE    What was the call regarding:     PATIENT REQUESTED A CALL BACK WITH INFORMATION REGARDING APPOINTMENT SCHEDULED FOR DOPPLER TEST ON 8/27 AT 9:00     PATIENT NEEDS TO KNOW WHAT THE APPOINTMENT IS FOR AND LOCATION

## 2024-08-20 NOTE — TELEPHONE ENCOUNTER
CALLED THE PT AND GAVE HER THE APPT TIME AND DATE FOR HER LEG ULTRASOUND AND WITH HER NEPHROLOGIST, PT UNDERSTOOD AND KNOWS TO CALL BACK WITH ANY QUESTIONS OR CONCERNS, FYI/

## 2024-08-27 ENCOUNTER — HOSPITAL ENCOUNTER (OUTPATIENT)
Facility: HOSPITAL | Age: 64
Discharge: HOME | End: 2024-08-27
Admitting: NURSE PRACTITIONER
Payer: MEDICARE

## 2024-08-27 VITALS — WEIGHT: 231 LBS | HEIGHT: 61 IN | BODY MASS INDEX: 43.61 KG/M2

## 2024-08-27 DIAGNOSIS — I73.9 PERIPHERAL VASCULAR DISEASE: ICD-10-CM

## 2024-08-27 LAB
BH CV LOWER ARTERIAL LEFT ABI RATIO: 0.57
BH CV LOWER ARTERIAL LEFT CALF RATIO: 0.53
BH CV LOWER ARTERIAL LEFT DORSALIS PEDIS SYS MAX: 81
BH CV LOWER ARTERIAL LEFT GREAT TOE SYS MAX: 52
BH CV LOWER ARTERIAL LEFT LOW THIGH RATIO: NORMAL
BH CV LOWER ARTERIAL LEFT LOW THIGH SYS MAX: NORMAL
BH CV LOWER ARTERIAL LEFT POPLITEAL SYS MAX: 75
BH CV LOWER ARTERIAL LEFT POST TIBIAL SYS MAX: 79
BH CV LOWER ARTERIAL LEFT TBI RATIO: 0.37
BH CV LOWER ARTERIAL RIGHT ABI RATIO: 0.57
BH CV LOWER ARTERIAL RIGHT CALF RATIO: 0.61
BH CV LOWER ARTERIAL RIGHT DORSALIS PEDIS SYS MAX: 70
BH CV LOWER ARTERIAL RIGHT GREAT TOE SYS MAX: 43
BH CV LOWER ARTERIAL RIGHT LOW THIGH RATIO: 1.01
BH CV LOWER ARTERIAL RIGHT LOW THIGH SYS MAX: 142
BH CV LOWER ARTERIAL RIGHT POPLITEAL SYS MAX: 86
BH CV LOWER ARTERIAL RIGHT POST TIBIAL SYS MAX: 80
BH CV LOWER ARTERIAL RIGHT TBI RATIO: 0.3
UPPER ARTERIAL LEFT ARM BRACHIAL SYS MAX: 141
UPPER ARTERIAL RIGHT ARM BRACHIAL SYS MAX: 134

## 2024-08-27 PROCEDURE — 93923 UPR/LXTR ART STDY 3+ LVLS: CPT | Performed by: INTERNAL MEDICINE

## 2024-08-27 PROCEDURE — 93923 UPR/LXTR ART STDY 3+ LVLS: CPT

## 2024-08-28 ENCOUNTER — TELEPHONE (OUTPATIENT)
Dept: INTERNAL MEDICINE | Facility: CLINIC | Age: 64
End: 2024-08-28
Payer: MEDICARE

## 2024-08-28 DIAGNOSIS — I73.9 PAD (PERIPHERAL ARTERY DISEASE): Primary | ICD-10-CM

## 2024-08-28 DIAGNOSIS — I73.9 PVD (PERIPHERAL VASCULAR DISEASE): ICD-10-CM

## 2024-08-28 NOTE — TELEPHONE ENCOUNTER
Attempted to call patient. Vm is full.       OK FOR HUB TO RELAY MESSAGE    ----- Message from Sidra Rahman sent at 8/28/2024  2:00 PM EDT -----  Please contact patient and advise:    The leg ultrasound shows you have reduced blood flow in your legs. I have ordered a referral to vascular specialist for a consult to see if they suggest anything to help. You will get called with appt.

## 2024-08-29 ENCOUNTER — TELEPHONE (OUTPATIENT)
Dept: INTERNAL MEDICINE | Facility: CLINIC | Age: 64
End: 2024-08-29
Payer: MEDICARE

## 2024-08-29 RX ORDER — TRAZODONE HYDROCHLORIDE 150 MG/1
150 TABLET ORAL NIGHTLY
Qty: 90 TABLET | Refills: 1 | Status: SHIPPED | OUTPATIENT
Start: 2024-08-29

## 2024-08-29 NOTE — TELEPHONE ENCOUNTER
Name: Jayna Ambrocio      Relationship: Self      Best Callback Number: [phone]       HUB PROVIDED THE RELAY MESSAGE FROM THE OFFICE      PATIENT: VOICED UNDERSTANDING AND HAS NO FURTHER QUESTIONS AT THIS TIME    ADDITIONAL INFORMATION:

## 2024-08-29 NOTE — TELEPHONE ENCOUNTER
Please contact patient and advise the amitriptyline was accidentally filled by this office. I have her taking trazodone and seroquel for sleep. She cannot take amitriptyline and trazodone together and needs to discontinue amitriptyline.

## 2024-08-29 NOTE — TELEPHONE ENCOUNTER
Caller: Jayna Ambrocio    Relationship: Self    Best call back number: [phone]     Requested Prescriptions:   Requested Prescriptions     Pending Prescriptions Disp Refills    traZODone (DESYREL) 150 MG tablet 90 tablet 1     Sig: Take 1 tablet by mouth Every Night.        Pharmacy where request should be sent: [facility] #[id number] - [place], [place] - [address] [phone] [facility] [phone] FX     Last office visit with prescribing clinician: 7/11/2024   Last telemedicine visit with prescribing clinician: Visit date not found   Next office visit with prescribing clinician: 9/27/2024     Additional details provided by patient:     Does the patient have less than a 3 day supply:  [] Yes  [x] No    Would you like a call back once the refill request has been completed: [] Yes [x] No    If the office needs to give you a call back, can they leave a voicemail: [] Yes [x] No    Payton Milton Rep   08/29/24 14:16 EDT

## 2024-08-30 ENCOUNTER — TELEPHONE (OUTPATIENT)
Dept: INTERNAL MEDICINE | Facility: CLINIC | Age: 64
End: 2024-08-30
Payer: MEDICARE

## 2024-08-30 NOTE — TELEPHONE ENCOUNTER
Hub can read and reschedule:    Left message for patient to call and reschedule 9/27 as provider is not in the office that day.

## 2024-09-18 RX ORDER — TRAZODONE HYDROCHLORIDE 150 MG/1
150 TABLET ORAL NIGHTLY
Qty: 90 TABLET | Refills: 1 | Status: SHIPPED | OUTPATIENT
Start: 2024-09-18

## 2024-09-18 RX ORDER — TRAZODONE HYDROCHLORIDE 150 MG/1
150 TABLET ORAL NIGHTLY
Qty: 90 TABLET | Refills: 1 | Status: SHIPPED | OUTPATIENT
Start: 2024-09-18 | End: 2024-09-18 | Stop reason: SDUPTHER

## 2024-09-20 ENCOUNTER — TRANSCRIBE ORDERS (OUTPATIENT)
Dept: ADMINISTRATIVE | Facility: HOSPITAL | Age: 64
End: 2024-09-20
Payer: MEDICARE

## 2024-09-20 DIAGNOSIS — N18.32 CHRONIC KIDNEY DISEASE (CKD) STAGE G3B/A1, MODERATELY DECREASED GLOMERULAR FILTRATION RATE (GFR) BETWEEN 30-44 ML/MIN/1.73 SQUARE METER AND ALBUMINURIA CREATININE RATIO LESS THAN 30 MG/G (CMS/H*: Primary | ICD-10-CM

## 2024-09-20 DIAGNOSIS — I73.9 PAD (PERIPHERAL ARTERY DISEASE): ICD-10-CM

## 2024-09-20 DIAGNOSIS — I25.810 CORONARY ARTERY DISEASE INVOLVING CORONARY BYPASS GRAFT WITHOUT ANGINA PECTORIS, UNSPECIFIED WHETHER NATIVE OR TRANSPLANTED HEART: ICD-10-CM

## 2024-09-20 RX ORDER — ROSUVASTATIN CALCIUM 40 MG/1
40 TABLET, COATED ORAL DAILY
Qty: 30 TABLET | Refills: 5 | Status: SHIPPED | OUTPATIENT
Start: 2024-09-20

## 2024-09-27 ENCOUNTER — HOSPITAL ENCOUNTER (OUTPATIENT)
Dept: ULTRASOUND IMAGING | Facility: HOSPITAL | Age: 64
Discharge: HOME | End: 2024-09-27
Payer: MEDICARE

## 2024-09-27 DIAGNOSIS — N18.32 CHRONIC KIDNEY DISEASE (CKD) STAGE G3B/A1, MODERATELY DECREASED GLOMERULAR FILTRATION RATE (GFR) BETWEEN 30-44 ML/MIN/1.73 SQUARE METER AND ALBUMINURIA CREATININE RATIO LESS THAN 30 MG/G (CMS/H*: ICD-10-CM

## 2024-09-27 PROCEDURE — 76775 US EXAM ABDO BACK WALL LIM: CPT

## 2024-09-30 ENCOUNTER — OFFICE VISIT (OUTPATIENT)
Dept: INTERNAL MEDICINE | Facility: CLINIC | Age: 64
End: 2024-09-30
Payer: MEDICARE

## 2024-09-30 ENCOUNTER — TELEPHONE (OUTPATIENT)
Dept: INTERNAL MEDICINE | Facility: CLINIC | Age: 64
End: 2024-09-30

## 2024-09-30 VITALS
OXYGEN SATURATION: 96 % | BODY MASS INDEX: 43.23 KG/M2 | TEMPERATURE: 97.4 F | HEIGHT: 61 IN | HEART RATE: 73 BPM | SYSTOLIC BLOOD PRESSURE: 116 MMHG | DIASTOLIC BLOOD PRESSURE: 86 MMHG | WEIGHT: 229 LBS

## 2024-09-30 DIAGNOSIS — F51.01 PRIMARY INSOMNIA: ICD-10-CM

## 2024-09-30 DIAGNOSIS — Z00.00 MEDICARE ANNUAL WELLNESS VISIT, SUBSEQUENT: Primary | ICD-10-CM

## 2024-09-30 DIAGNOSIS — F32.A ANXIETY AND DEPRESSION: ICD-10-CM

## 2024-09-30 DIAGNOSIS — I10 ESSENTIAL HYPERTENSION: ICD-10-CM

## 2024-09-30 DIAGNOSIS — M79.605 PAIN IN BOTH LOWER EXTREMITIES: ICD-10-CM

## 2024-09-30 DIAGNOSIS — Z12.11 SCREENING FOR COLON CANCER: ICD-10-CM

## 2024-09-30 DIAGNOSIS — N18.32 STAGE 3B CHRONIC KIDNEY DISEASE: ICD-10-CM

## 2024-09-30 DIAGNOSIS — K21.9 GASTROESOPHAGEAL REFLUX DISEASE WITHOUT ESOPHAGITIS: ICD-10-CM

## 2024-09-30 DIAGNOSIS — Z79.899 MEDICATION MANAGEMENT: ICD-10-CM

## 2024-09-30 DIAGNOSIS — Z79.4 TYPE 2 DIABETES MELLITUS WITHOUT COMPLICATION, WITH LONG-TERM CURRENT USE OF INSULIN: ICD-10-CM

## 2024-09-30 DIAGNOSIS — G89.29 CHRONIC PAIN OF LEFT KNEE: ICD-10-CM

## 2024-09-30 DIAGNOSIS — E53.9 VITAMIN B DEFICIENCY: ICD-10-CM

## 2024-09-30 DIAGNOSIS — M79.604 PAIN IN BOTH LOWER EXTREMITIES: ICD-10-CM

## 2024-09-30 DIAGNOSIS — F41.9 ANXIETY AND DEPRESSION: ICD-10-CM

## 2024-09-30 DIAGNOSIS — E11.9 TYPE 2 DIABETES MELLITUS WITHOUT COMPLICATION, WITH LONG-TERM CURRENT USE OF INSULIN: ICD-10-CM

## 2024-09-30 DIAGNOSIS — E78.2 MIXED HYPERLIPIDEMIA: ICD-10-CM

## 2024-09-30 DIAGNOSIS — Z23 NEED FOR SHINGLES VACCINE: ICD-10-CM

## 2024-09-30 DIAGNOSIS — I73.9 PAD (PERIPHERAL ARTERY DISEASE): ICD-10-CM

## 2024-09-30 DIAGNOSIS — Z23 NEED FOR INFLUENZA VACCINATION: ICD-10-CM

## 2024-09-30 DIAGNOSIS — E55.9 VITAMIN D DEFICIENCY: ICD-10-CM

## 2024-09-30 DIAGNOSIS — M25.562 CHRONIC PAIN OF LEFT KNEE: ICD-10-CM

## 2024-09-30 DIAGNOSIS — I25.810 CORONARY ARTERY DISEASE INVOLVING CORONARY BYPASS GRAFT WITHOUT ANGINA PECTORIS, UNSPECIFIED WHETHER NATIVE OR TRANSPLANTED HEART: ICD-10-CM

## 2024-09-30 LAB
EXPIRATION DATE: ABNORMAL
HBA1C MFR BLD: 7.2 % (ref 4.5–5.7)
Lab: ABNORMAL

## 2024-09-30 PROCEDURE — 82570 ASSAY OF URINE CREATININE: CPT | Performed by: NURSE PRACTITIONER

## 2024-09-30 PROCEDURE — 82043 UR ALBUMIN QUANTITATIVE: CPT | Performed by: NURSE PRACTITIONER

## 2024-09-30 RX ORDER — EMPAGLIFLOZIN 10 MG/1
1 TABLET, FILM COATED ORAL DAILY
COMMUNITY
Start: 2024-08-07

## 2024-09-30 RX ORDER — CHOLECALCIFEROL (VITAMIN D3) 25 MCG
1000 CAPSULE ORAL DAILY
Qty: 90 CAPSULE | Refills: 3 | Status: SHIPPED | OUTPATIENT
Start: 2024-09-30 | End: 2024-09-30

## 2024-09-30 RX ORDER — DIPHENOXYLATE HYDROCHLORIDE AND ATROPINE SULFATE 2.5; .025 MG/1; MG/1
1 TABLET ORAL DAILY
Qty: 90 TABLET | Refills: 3 | Status: SHIPPED | OUTPATIENT
Start: 2024-09-30

## 2024-09-30 RX ORDER — CHOLECALCIFEROL (VITAMIN D3) 50 MCG
2000 TABLET ORAL DAILY
Qty: 90 TABLET | Refills: 3 | Status: SHIPPED | OUTPATIENT
Start: 2024-09-30

## 2024-09-30 RX ORDER — HYDROCODONE BITARTRATE AND ACETAMINOPHEN 7.5; 325 MG/1; MG/1
1 TABLET ORAL 2 TIMES DAILY PRN
Qty: 60 TABLET | Refills: 0 | Status: SHIPPED | OUTPATIENT
Start: 2024-09-30 | End: 2024-10-02 | Stop reason: SDUPTHER

## 2024-09-30 RX ORDER — CYANOCOBALAMIN (VITAMIN B-12) 1000 MCG
1 TABLET ORAL DAILY
Qty: 90 TABLET | Refills: 3 | Status: SHIPPED | OUTPATIENT
Start: 2024-09-30

## 2024-09-30 NOTE — PROGRESS NOTES
The ABCs of the Annual Wellness Visit  Subsequent to Medicare Visit    Chief Complaint   Patient presents with    Medicare Wellness-subsequent       Subjective   History of Present Illness:  Jayna Ambrocio is a 64 y.o. female who presents for a  Welcome to Medicare Visit.    History of Present Illness    The patient presents for evaluation of multiple medical concerns.    She is experiencing severe pain in her left knee, which disrupts her sleep and intensifies at night, exacerbated by movement. She had ultrasound on both her legs shows PAD. However, she has not yet received any follow-up from the vascular specialist she was referred to. She is having severe leg and left knee pain. She's not heard back on the ortho referral. Has tried nsaids, tylenol, and tramadol with no relief.     She had a consultation with her nephrologist last Friday, during which an ultrasound of her kidneys was performed. She is still awaiting the results. She continues to see her endocrinologist for diabetes management and has an upcoming appointment scheduled for October 2024. She also regularly consults with her cardiologist, Dr. Camejo. Her blood pressure is well-controlled. She has been unable to afford her aspirin and vitamin supplements for the past week. Despite her knee pain, she maintains a good appetite.    A1C 7.5 today. She is seeing endocrinology. Her depression, anxiety, and insomnia are all stable with current medication.     She would like to see if aspirin and her vitamin supplements are covered under her insurance.     She has never undergone a hysterectomy and believes her Pap smear is up-to-date. She has also had a breast exam and colonoscopy. She is due for an eye exam and would like a referral for this. She reports no chest pain or difficulty swallowing.    Overall healthy: Yes  Regular exercise:  No, left knee pain  Diet is well balanced:  Yes  Vitamin Supplement:  Yes  Alcohol intake:  No   Tobacco use:  No   "  Cardiovascular risk is low:  moderate  Menstrual cycle regular:  N/A  PAP:  Up to date  Mammogram:  up to date  Last colon screening:  up to date  Regular dental exam:  Yes   Regular eye exam:  Due, referring for diabetic eye exam.   Immunizations up to date:  getting flu today and sending shingles to pharmacy.   Wear a seatbelt regularly:  Yes  Wear sunscreen: yes if out in sun excessively    The ASCVD Risk score (Ricki DK, et al., 2019) failed to calculate for the following reasons:    The valid total cholesterol range is 130 to 320 mg/dL        HEALTH RISK ASSESSMENT    Recent Hospitalizations:  No hospitalization(s) within the last year.    Current Medical Providers:  Patient Care Team:  Sidra Cobos APRN as PCP - General (Nurse Practitioner)  Devin Leone MD as Consulting Physician (Gastroenterology)    Smoking Status:  Social History     Tobacco Use   Smoking Status Former    Current packs/day: 0.00    Average packs/day: 0.5 packs/day for 35.0 years (17.5 ttl pk-yrs)    Types: Cigarettes    Start date:     Quit date:     Years since quitting: 10.7    Passive exposure: Past   Smokeless Tobacco Never   Tobacco Comments    \"1 pack could last 2 weeks\"       Alcohol Consumption:  Social History     Substance and Sexual Activity   Alcohol Use No       Depression Screen:       2024    11:46 AM   PHQ-2/PHQ-9 Depression Screening   Little Interest or Pleasure in Doing Things 1-->several days   Feeling Down, Depressed or Hopeless 1-->several days   PHQ-9: Brief Depression Severity Measure Score 2       Fall Risk Screen:  STEADI Fall Risk Assessment was completed, and patient is at LOW risk for falls.Assessment completed on:2024    Health Habits and Functional and Cognitive Screenin/30/2024    11:00 AM   Functional & Cognitive Status   Do you have difficulty using the toilet? No   Do you have difficulty moving around from place to place? Yes   Do you have trouble with " steps or getting out of a bed or a chair? Yes   Current Diet Unhealthy Diet   Dental Exam Unknown   Eye Exam Unknown   Exercise (times per week) Other   Current Exercises Include Other   Do you need help using the phone?  No   Are you deaf or do you have serious difficulty hearing?  No   Do you need help to go to places out of walking distance? Yes   Do you need help shopping? Yes   Do you need help preparing meals?  Yes   Do you need help with housework?  Yes   Do you need help with laundry? Yes   Do you need help taking your medications? No   Do you need help managing money? No   Do you ever drive or ride in a car without wearing a seat belt? No   Have you felt unusual stress, anger or loneliness in the last month? Yes   If you need help, do you have trouble finding someone available to you? Yes   Have you been bothered in the last four weeks by sexual problems? Yes   Do you have difficulty concentrating, remembering or making decisions? No         Does the patient have evidence of cognitive impairment? No    Asprin use counseling:Taking ASA appropriately as indicated    Visual Acuity:    Vision Screening    Right eye Left eye Both eyes   Without correction 20/25 20/70    With correction          Age-appropriate Screening Schedule:  Refer to the list below for future screening recommendations based on patient's age, sex and/or medical conditions. Orders for these recommended tests are listed in the plan section. The patient has been provided with a written plan.    Health Maintenance   Topic Date Due    ZOSTER VACCINE (1 of 2) Never done    DIABETIC FOOT EXAM  02/10/2021    DIABETIC EYE EXAM  03/09/2021    DXA SCAN  10/29/2021    PAP SMEAR  02/12/2022    COVID-19 Vaccine (1 - 2023-24 season) Never done    COLORECTAL CANCER SCREENING  12/01/2024    HEMOGLOBIN A1C  03/30/2025    LIPID PANEL  05/23/2025    BMI FOLLOWUP  07/11/2025    ANNUAL WELLNESS VISIT  09/30/2025    URINE MICROALBUMIN  09/30/2025    MAMMOGRAM   01/31/2026    HEPATITIS C SCREENING  Completed    INFLUENZA VACCINE  Completed    Pneumococcal Vaccine 65+  Completed    TDAP/TD VACCINES  Discontinued          The following portions of the patient's history were reviewed and updated as appropriate: allergies, current medications, past family history, past medical history, past social history, past surgical history, and problem list.    Review of Systems  Pertinent items are noted in HPI.     Outpatient Medications Prior to Visit   Medication Sig Dispense Refill    amitriptyline (ELAVIL) 25 MG tablet       FLUoxetine (PROzac) 20 MG capsule Take 3 capsules by mouth Daily.      Jardiance 10 MG tablet tablet Take 1 tablet by mouth Daily.      ACCU-CHEK SOFTCLIX LANCETS lancets USE TO TEST BLOOD GLUCOSE THREE TIMES DAILY AS DIRECTED  5    B-D UF III MINI PEN NEEDLES 31G X 5 MM misc Use with insulin twice a day 60 each 11    B-D ULTRAFINE III SHORT PEN 31G X 8 MM misc Inject 1 unit marking on U-100 syringe as directed 2 (Two) Times a Day. 90 each 2    Blood Glucose Monitoring Suppl (ACCU-CHEK MARIO PLUS) w/Device kit use to test blood glucose three times daily  5    Dulaglutide 0.75 MG/0.5ML solution pen-injector Inject 0.75 mg under the skin into the appropriate area as directed 1 (One) Time Per Week. 2 mL 1    glucose blood (OneTouch Verio) test strip TEST THREE TIMES A DAY E11.9 100 each 1    Insulin Lispro Prot & Lispro (HumaLOG Mix 75/25 KwikPen) (75-25) 100 UNIT/ML suspension pen-injector pen Inject 20 Units under the skin into the appropriate area as directed 2 (Two) Times a Day. 5 pen 3    isosorbide mononitrate (IMDUR) 60 MG 24 hr tablet Take 1 tablet by mouth Every Morning. 90 tablet 2    lisinopril (PRINIVIL,ZESTRIL) 2.5 MG tablet TAKE 1 TABLET BY MOUTH EVERY DAY 90 tablet 3    methocarbamol (ROBAXIN) 500 MG tablet Take 1 tablet by mouth 3 (Three) Times a Day As Needed for Muscle Spasms. 90 tablet 1    metoprolol tartrate (LOPRESSOR) 50 MG tablet Take 1  tablet by mouth 2 (Two) Times a Day. 180 tablet 1    pantoprazole (PROTONIX) 40 MG EC tablet TAKE 1 TABLET BY MOUTH EVERY DAY 90 tablet 1    QUEtiapine (SEROquel) 50 MG tablet TAKE 1 TABLET BY MOUTH EVERY DAY AT NIGHT 90 tablet 3    rosuvastatin (CRESTOR) 40 MG tablet Take 1 tablet by mouth Daily. 200001 30 tablet 5    sertraline (Zoloft) 100 MG tablet Take 1 tablet by mouth Daily. 90 tablet 1    sertraline (ZOLOFT) 50 MG tablet TAKE 1 TABLET BY MOUTH EVERY DAY 30 tablet 2    Specialty Vitamins Products (HEALTHY HEART PO) Take 400 unit marking on U-100 syringe by mouth Daily.      traZODone (DESYREL) 150 MG tablet Take 1 tablet by mouth Every Night. 90 tablet 1    Aspirin 81 MG capsule Take 1 tablet by mouth Daily. 90 capsule 1    Cholecalciferol (VITAMIN D-3) 25 MCG (1000 UT) capsule Take 1,000 Units by mouth Daily.      clopidogrel (PLAVIX) 75 MG tablet TAKE 1 TABLET BY MOUTH DAILY. PATIENT MUST KEEP APPOINTMENT FOR FURTHER REFILLS. (Patient not taking: Reported on 7/11/2024) 90 tablet 1    Cyanocobalamin (B-12) 1000 MCG tablet Take 1 tablet by mouth Daily. 30 tablet 2    multivitamin (Multiple Vitamin) tablet tablet Take 1 tablet by mouth Daily. 30 tablet 11    QUEtiapine (SEROquel) 100 MG tablet TAKE 1 TABLET BY MOUTH EVERY DAY AT NIGHT 90 tablet 2     No facility-administered medications prior to visit.       Patient Active Problem List   Diagnosis    Type 2 diabetes mellitus    Coronary artery disease    Essential hypertension    Acute renal insufficiency    Postoperative anemia due to acute blood loss    Insomnia    Gastroesophageal reflux disease    Moderate nonproliferative diabetic retinopathy without macular edema associated with type 2 diabetes mellitus    Class 3 severe obesity due to excess calories with serious comorbidity and body mass index (BMI) of 40.0 to 44.9 in adult    S/P CABG (coronary artery bypass graft)    Coronary artery disease of bypass graft of native heart with stable angina pectoris  "   PAD (peripheral artery disease)    History of atrial fibrillation    Osteopenia of neck of left femur    Current mild episode of major depressive disorder without prior episode    Morbid (severe) obesity due to excess calories       Advanced Care Planning:  ACP discussion was held with the patient during this visit. Patient does not have an advance directive, information provided.      Compared to one year ago, the patient feels her physical health is worse.  Compared to one year ago, the patient feels her mental health is the same.    Opioid medication/s are on active medication list, and I have evaluated active treatment plan and pain score trends  Reviewed chart for potential of high risk medication in the elderly: yes  Reviewed chart for potential of harmful drug interactions in the elderly:yes    Objective         Vitals:    09/30/24 1144   BP: 116/86   BP Location: Left arm   Patient Position: Sitting   Cuff Size: Large Adult   Pulse: 73   Temp: 97.4 °F (36.3 °C)   SpO2: 96%   Weight: 104 kg (229 lb)   Height: 154.9 cm (60.98\")   PainSc:   9   PainLoc: Knee       Body mass index is 43.29 kg/m².  Discussed the patient's BMI with her. The BMI is above average; BMI management plan is completed.    Physical Exam  Vitals reviewed.   Constitutional:       Appearance: Normal appearance. She is well-developed.   HENT:      Head: Normocephalic and atraumatic.      Right Ear: Hearing, tympanic membrane, ear canal and external ear normal.      Left Ear: Hearing, tympanic membrane, ear canal and external ear normal.      Nose: Nose normal.      Mouth/Throat:      Lips: Pink.      Mouth: Mucous membranes are moist.      Pharynx: Oropharynx is clear. Uvula midline.   Eyes:      General: Lids are normal.      Extraocular Movements: Extraocular movements intact.      Conjunctiva/sclera: Conjunctivae normal.      Pupils: Pupils are equal, round, and reactive to light.   Neck:      Thyroid: No thyromegaly.      Trachea: " Trachea normal.   Cardiovascular:      Rate and Rhythm: Normal rate and regular rhythm.      Pulses:           Carotid pulses are 2+ on the right side and 2+ on the left side.     Heart sounds: Normal heart sounds.   Pulmonary:      Effort: Pulmonary effort is normal. No respiratory distress.      Breath sounds: Normal breath sounds.   Abdominal:      General: Bowel sounds are normal.      Palpations: Abdomen is soft.      Tenderness: There is no abdominal tenderness.   Musculoskeletal:      Left knee: Decreased range of motion. Tenderness present.      Comments: Normal balance. Patient limping with off set gait due to left knee pain.    Skin:     General: Skin is warm and dry.      Capillary Refill: Capillary refill takes 2 to 3 seconds.   Neurological:      Mental Status: She is alert and oriented to person, place, and time.      GCS: GCS eye subscore is 4. GCS verbal subscore is 5. GCS motor subscore is 6.   Psychiatric:         Attention and Perception: Attention normal.         Mood and Affect: Mood normal.         Speech: Speech normal.         Behavior: Behavior normal. Behavior is cooperative.         Thought Content: Thought content normal.         Results  Imaging  Ultrasound of kidneys conducted on September 27. Ultrasound of legs conducted on August 27. Knee x-ray conducted on July 24.           Lab Results   Component Value Date    HGBA1C 7.2 (A) 09/30/2024            Assessment & Plan   Medicare Risks and Personalized Health Plan  CMS Preventative Services Quick Reference  Advance Directive Discussion  Cardiovascular risk  Chronic Pain   Depression/Dysphoria  Fall Risk  Inactivity/Sedentary  Obesity/Overweight   Osteoporosis Risk    The above risks/problems have been discussed with the patient.  Pertinent information has been shared with the patient in the After Visit Summary.  Follow up plans and orders are seen below in the Assessment/Plan Section.    Diagnoses and all orders for this visit:    1.  Medicare annual wellness visit, subsequent (Primary)    2. Type 2 diabetes mellitus without complication, with long-term current use of insulin  -     POC Glycosylated Hemoglobin (Hb A1C)  -     Microalbumin / Creatinine Urine Ratio - Urine, Clean Catch; Future  -     Microalbumin / Creatinine Urine Ratio - Urine, Clean Catch  -     Ambulatory Referral for Diabetic Eye Exam-Ophthalmology    3. Chronic pain of left knee  -     Discontinue: HYDROcodone-acetaminophen (NORCO) 7.5-325 MG per tablet; Take 1 tablet by mouth 2 (Two) Times a Day As Needed for Moderate Pain or Mild Pain.  Dispense: 60 tablet; Refill: 0    4. PAD (peripheral artery disease)    5. Essential hypertension    6. Vitamin D deficiency  -     multivitamin (Multiple Vitamin) tablet tablet; Take 1 tablet by mouth Daily.  Dispense: 90 tablet; Refill: 3  -     Cholecalciferol (Vitamin D) 50 MCG (2000 UT) tablet; Take 1 tablet by mouth Daily.  Dispense: 90 tablet; Refill: 3    7. Coronary artery disease involving coronary bypass graft without angina pectoris, unspecified whether native or transplanted heart  -     Aspirin 81 MG capsule; Take 1 tablet by mouth Daily.  Dispense: 90 capsule; Refill: 3    8. Pain in both lower extremities    9. Vitamin B deficiency  -     multivitamin (Multiple Vitamin) tablet tablet; Take 1 tablet by mouth Daily.  Dispense: 90 tablet; Refill: 3  -     Cyanocobalamin (B-12) 1000 MCG tablet; Take 1 tablet by mouth Daily.  Dispense: 90 tablet; Refill: 3    10. Need for shingles vaccine  -     Zoster Vac Recomb Adjuvanted 50 MCG/0.5ML reconstituted suspension; Inject 0.5 mL into the appropriate muscle as directed by prescriber 1 (One) Time for 1 dose. Get 2nd injection in 2-6 months.  Dispense: 1 each; Refill: 1    11. Medication management  -     Compliance Drug Analysis, Ur - Urine, Clean Catch; Future  -     Compliance Drug Analysis, Ur - Urine, Clean Catch    12. Need for influenza vaccination  -     Fluzone High-Dose 65+yrs  ([id number])    13. Screening for colon cancer  -     Ambulatory Referral For Screening Colonoscopy    14. Anxiety and depression    Other orders  -     Discontinue: Cholecalciferol (Vitamin D-3) 25 MCG (1000 UT) capsule; Take 1,000 Units by mouth Daily.  Dispense: 90 capsule; Refill: 3        Assessment & Plan  Medicare wellness  Patient will continue to eat a well-balanced diet, drink adequate amount of water, exercise at least 3 times weekly, and get adequate rest.  Discussed future preventative screenings and immunizations.    Start hydrocodone PRN for leg and knee pain. Messaged referral coordinator asking her to follow up with patient on scheduling ortho and vascular surgery appts. Patient educated on risks and side effects of taking hydrocodone including risk of addiction and sedation and will use this sparingly. UDS and CSC completed. Kelvin reviewed and appropriate. Continue lisinopril, Imdur, metoprolol, and aspirin for hypertension and coronary artery disease.  Continue follow-ups with cardiology. Continue Crestor for hyperlipidemia.  Follow heart healthy low-cholesterol diet.  Continue Jardiance, Trulicity, and Humalog Mix for diabetes.  Follow diabetic diet.  Continue follow-ups with endocrinology.  Continue follow-ups with nephrology for chronic kidney disease. Continue Zoloft, trazodone, and Seroquel for depression, anxiety, and sleep.  Continue Protonix for GERD.  Continue vitamin supplements for vitamin D and B12 deficiencies.    Prescriptions for B12, multivitamin, vitamin D, and 81 mg aspirin have been sent to the pharmacy. She has been advised to check with the pharmacy about insurance coverage for these medications.    Health Maintenance.  She is due for a colonoscopy after 12/01/2024, and an order has been placed for scheduling. She will receive her influenza vaccine today. A shingles vaccine prescription has been sent to the pharmacy. A diabetic eye exam will be scheduled. Blood work will be  conducted at her next visit in 3 months.    Follow-up  Return in 3 months for follow-up.    Follow Up:  Return in about 3 months (around 12/30/2024), or if symptoms worsen or fail to improve, for Follow-up.     An After Visit Summary and PPPS were given to the patient.       Transcribed from ambient dictation for ROCCO Monroy by ROCCO Monroy.  09/30/24   12:04 EDT    Patient or patient representative verbalized consent for the use of Ambient Listening during the visit with  ROCCO Monroy for chart documentation. 10/2/2024  22:16 EDT

## 2024-09-30 NOTE — TELEPHONE ENCOUNTER
Patient states she has not heard from ortho referral on left knee and it was placed in July. And has not heard from vascular surgery and referral was placed on August. Can you follow up with her on these. Thanks.

## 2024-09-30 NOTE — TELEPHONE ENCOUNTER
HUB CAN RELAY MESSAGE TO PATIENT     ATTEMPTED TO CONTACT THE PT TO INFORM HER SHE MAY CALL [facility] AT [phone]  AND [facility] [phone], BOTH OFFICE'S HAVE TRIED TO CALL AND SCHEDULE THE PT, BUT SINCE NO ANSWER SHE MAY CALL THEIR OFFICE AND MAKE AN APPT. NEHA

## 2024-10-01 DIAGNOSIS — G89.29 CHRONIC PAIN OF LEFT KNEE: ICD-10-CM

## 2024-10-01 DIAGNOSIS — M25.562 CHRONIC PAIN OF LEFT KNEE: ICD-10-CM

## 2024-10-01 LAB
ALBUMIN UR-MCNC: <1.2 MG/DL
CREAT UR-MCNC: 86.3 MG/DL
MICROALBUMIN/CREAT UR: NORMAL MG/G{CREAT}

## 2024-10-01 NOTE — TELEPHONE ENCOUNTER
Caller: Jayna Ambrocio    Relationship: Self    Best call back number: [phone]    Which medication are you concerned about: HYDROcodone-acetaminophen (NORCO) 7.5-325 MG per tablet     Who prescribed you this medication: BASSAM VALIENTE     What are your concerns: PATIENT WENT TO THE PHARMACY TO GET HER PAIN MEDICATIONS. PHARMACY DID NOT HAVE THE SCRIPT FOR  HYDROcodone-acetaminophen (NORCO) 7.5-325 MG per tablet. PHARMACY ALSO INFORMED PATIENT THEY HAVE NOT BEEN ABLE TO GET LORTAB IN FOR OVER A YEAR.     PLEASE CALL PATIENT AND ADVISE WHAT SHE NEEDS TO DO

## 2024-10-02 PROBLEM — M25.562 CHRONIC PAIN OF LEFT KNEE: Status: ACTIVE | Noted: 2024-10-02

## 2024-10-02 PROBLEM — E53.9 VITAMIN B DEFICIENCY: Status: ACTIVE | Noted: 2024-10-02

## 2024-10-02 PROBLEM — E78.2 MIXED HYPERLIPIDEMIA: Status: ACTIVE | Noted: 2024-10-02

## 2024-10-02 PROBLEM — F41.9 ANXIETY AND DEPRESSION: Status: ACTIVE | Noted: 2024-10-02

## 2024-10-02 PROBLEM — N18.32 STAGE 3B CHRONIC KIDNEY DISEASE: Status: ACTIVE | Noted: 2024-10-02

## 2024-10-02 PROBLEM — F32.A ANXIETY AND DEPRESSION: Status: ACTIVE | Noted: 2024-10-02

## 2024-10-02 PROBLEM — G89.29 CHRONIC PAIN OF LEFT KNEE: Status: ACTIVE | Noted: 2024-10-02

## 2024-10-02 RX ORDER — HYDROCODONE BITARTRATE AND ACETAMINOPHEN 7.5; 325 MG/1; MG/1
1 TABLET ORAL 2 TIMES DAILY PRN
Qty: 60 TABLET | Refills: 0 | Status: SHIPPED | OUTPATIENT
Start: 2024-10-02

## 2024-10-04 LAB — DRUGS UR: NORMAL

## 2024-10-06 DIAGNOSIS — M25.512 CHRONIC LEFT SHOULDER PAIN: ICD-10-CM

## 2024-10-06 DIAGNOSIS — G89.29 CHRONIC LEFT SHOULDER PAIN: ICD-10-CM

## 2024-10-06 DIAGNOSIS — G89.29 CHRONIC PAIN OF LEFT KNEE: ICD-10-CM

## 2024-10-06 DIAGNOSIS — M62.838 MUSCLE SPASM: ICD-10-CM

## 2024-10-06 DIAGNOSIS — M25.562 CHRONIC PAIN OF LEFT KNEE: ICD-10-CM

## 2024-10-07 RX ORDER — METHOCARBAMOL 500 MG/1
500 TABLET, FILM COATED ORAL 3 TIMES DAILY PRN
Qty: 90 TABLET | Refills: 3 | Status: SHIPPED | OUTPATIENT
Start: 2024-10-07

## 2024-10-08 DIAGNOSIS — I10 ESSENTIAL HYPERTENSION: ICD-10-CM

## 2024-10-08 RX ORDER — METOPROLOL TARTRATE 50 MG
50 TABLET ORAL 2 TIMES DAILY
Qty: 180 TABLET | Refills: 2 | Status: SHIPPED | OUTPATIENT
Start: 2024-10-08

## 2024-10-25 DIAGNOSIS — G89.29 CHRONIC PAIN OF LEFT KNEE: ICD-10-CM

## 2024-10-25 DIAGNOSIS — M25.562 CHRONIC PAIN OF LEFT KNEE: ICD-10-CM

## 2024-10-25 NOTE — TELEPHONE ENCOUNTER
Caller: Jayna Ambrocio    Relationship: Self    Best call back number: [phone]    Requested Prescriptions:   Requested Prescriptions     Pending Prescriptions Disp Refills    HYDROcodone-acetaminophen (NORCO) 7.5-325 MG per tablet 60 tablet 0     Sig: Take 1 tablet by mouth 2 (Two) Times a Day As Needed for Moderate Pain or Mild Pain.        Pharmacy where request should be sent:    [facility] [phone]  Last office visit with prescribing clinician: 9/30/2024   Last telemedicine visit with prescribing clinician: Visit date not found   Next office visit with prescribing clinician: 1/10/2025     Additional details provided by patient: PATIENT HAS 4 PILLS LEFT. IF THERE IS SOMETHING ELSE THAT SHE DOESN'T HAVE TO TAKE SO OFTEN SHE WOULD BE OK WITH THAT AS WELL. PLEASE ADVISE    Does the patient have less than a 3 day supply:  [x] Yes  [] No    Would you like a call back once the refill request has been completed: [] Yes [x] No    If the office needs to give you a call back, can they leave a voicemail: [] Yes [x] No    Payton Rios Rep   10/25/24 11:47 EDT

## 2024-10-27 RX ORDER — HYDROCODONE BITARTRATE AND ACETAMINOPHEN 7.5; 325 MG/1; MG/1
1 TABLET ORAL 2 TIMES DAILY PRN
Qty: 60 TABLET | Refills: 0 | Status: SHIPPED | OUTPATIENT
Start: 2024-11-01 | End: 2024-12-01

## 2024-10-30 ENCOUNTER — TELEPHONE (OUTPATIENT)
Dept: INTERNAL MEDICINE | Facility: CLINIC | Age: 64
End: 2024-10-30
Payer: MEDICARE

## 2024-10-30 DIAGNOSIS — K02.9 DENTAL CARIES: Primary | ICD-10-CM

## 2024-10-30 NOTE — TELEPHONE ENCOUNTER
Caller: Jayna Ambrocio    Relationship: Self    Best call back number:       [phone] (Home)     What is the medical concern/diagnosis:     PATIENT REQUESTED A REFERRAL FOR PROVIDER WHO WOULD BE ABLE TO PULL HER TEETH AND BE COVERED UNDER HER INSURANCE    What specialty or service is being requested:     DENTAL    What is the provider, practice or medical service name:     AS RECOMMENDED BY BASSAM NO - PATIENT DOESN'T WANT REFERRAL WITH     What is the office location:     [place] [facility]    Any additional details:     PATIENT REQUESTED A CALL BACK WITH ANY UPDATES FOR REFERRAL

## 2024-11-05 ENCOUNTER — TELEPHONE (OUTPATIENT)
Dept: INTERNAL MEDICINE | Facility: CLINIC | Age: 64
End: 2024-11-05
Payer: MEDICARE

## 2024-11-05 DIAGNOSIS — G47.09 OTHER INSOMNIA: ICD-10-CM

## 2024-11-05 NOTE — TELEPHONE ENCOUNTER
ANNIE SHE NEEDS HER 1.TRAZADONE 150 MG #90 2. AMITRIPTYLINE 25MG  #60 3. QUETIAPINE 50MG #30 SHE DID NOT GET THESE AND SHE NEEDS THEM PLEASE. [facility].

## 2024-11-07 RX ORDER — TRAZODONE HYDROCHLORIDE 150 MG/1
150 TABLET ORAL NIGHTLY
Qty: 90 TABLET | Refills: 1 | Status: SHIPPED | OUTPATIENT
Start: 2024-11-07

## 2024-11-07 RX ORDER — QUETIAPINE FUMARATE 50 MG/1
50 TABLET, FILM COATED ORAL
Qty: 90 TABLET | Refills: 3 | Status: SHIPPED | OUTPATIENT
Start: 2024-11-07

## 2024-11-07 NOTE — TELEPHONE ENCOUNTER
Patient called to follow up on refill request. She has been out of these meds for about a week. She would like to have them sent to [facility].

## 2024-11-08 NOTE — TELEPHONE ENCOUNTER
She is taking trazodone and seroquel for sleep. She cannot take trazodone and amitriptyline together due to being similar medications. Trazodone was filled yesterday for her.

## 2024-11-22 ENCOUNTER — OFFICE VISIT (OUTPATIENT)
Dept: INTERNAL MEDICINE | Facility: CLINIC | Age: 64
End: 2024-11-22
Payer: MEDICARE

## 2024-11-22 VITALS
SYSTOLIC BLOOD PRESSURE: 122 MMHG | HEART RATE: 75 BPM | DIASTOLIC BLOOD PRESSURE: 78 MMHG | WEIGHT: 227.2 LBS | TEMPERATURE: 97.4 F | HEIGHT: 61 IN | BODY MASS INDEX: 42.9 KG/M2 | OXYGEN SATURATION: 96 %

## 2024-11-22 DIAGNOSIS — G89.29 CHRONIC PAIN OF LEFT KNEE: ICD-10-CM

## 2024-11-22 DIAGNOSIS — E11.9 TYPE 2 DIABETES MELLITUS WITHOUT COMPLICATION, WITH LONG-TERM CURRENT USE OF INSULIN: Primary | ICD-10-CM

## 2024-11-22 DIAGNOSIS — M25.562 CHRONIC PAIN OF LEFT KNEE: ICD-10-CM

## 2024-11-22 DIAGNOSIS — I10 ESSENTIAL HYPERTENSION: ICD-10-CM

## 2024-11-22 DIAGNOSIS — M17.12 PRIMARY OSTEOARTHRITIS OF LEFT KNEE: ICD-10-CM

## 2024-11-22 DIAGNOSIS — Z79.4 TYPE 2 DIABETES MELLITUS WITHOUT COMPLICATION, WITH LONG-TERM CURRENT USE OF INSULIN: Primary | ICD-10-CM

## 2024-11-22 DIAGNOSIS — B37.2 SKIN YEAST INFECTION: ICD-10-CM

## 2024-11-22 RX ORDER — METOPROLOL TARTRATE 50 MG
50 TABLET ORAL 2 TIMES DAILY
Qty: 180 TABLET | Refills: 2 | Status: SHIPPED | OUTPATIENT
Start: 2024-11-22

## 2024-11-22 RX ORDER — NYSTATIN 100000 U/G
1 CREAM TOPICAL 2 TIMES DAILY PRN
Qty: 30 G | Refills: 2 | Status: SHIPPED | OUTPATIENT
Start: 2024-11-22

## 2024-11-22 RX ORDER — FLUCONAZOLE 100 MG/1
100 TABLET ORAL DAILY
Qty: 7 TABLET | Refills: 0 | Status: SHIPPED | OUTPATIENT
Start: 2024-11-22 | End: 2024-11-29

## 2024-11-22 RX ORDER — HYDROCODONE BITARTRATE AND ACETAMINOPHEN 7.5; 325 MG/1; MG/1
1 TABLET ORAL 2 TIMES DAILY PRN
Qty: 60 TABLET | Refills: 0 | Status: SHIPPED | OUTPATIENT
Start: 2024-11-22

## 2024-11-27 ENCOUNTER — PRIOR AUTHORIZATION (OUTPATIENT)
Dept: GASTROENTEROLOGY | Facility: CLINIC | Age: 64
End: 2024-11-27
Payer: MEDICARE

## 2024-11-27 RX ORDER — SODIUM, POTASSIUM,MAG SULFATES 17.5-3.13G
1 SOLUTION, RECONSTITUTED, ORAL ORAL TAKE AS DIRECTED
Qty: 354 ML | Refills: 0 | Status: SHIPPED | OUTPATIENT
Start: 2024-11-27

## 2024-11-27 NOTE — TELEPHONE ENCOUNTER
PA Case ID #: [id number]  Rx #: [id number]  Need Help? Call us at [phone]  Outcome  Approved today by [employer] 2017  PA Case: [id number], Status: Approved, Coverage Starts on: 8/28/2024 12:00:00 AM, Coverage Ends on: 11/27/2025 12:00:00 AM.  Authorization Expiration Date: 11/26/2025

## 2024-12-01 NOTE — PROGRESS NOTES
Subjective   Chief Complaint   Patient presents with    Rash under breast         Jayna Ambrocio is a 64 y.o. female.    History of Present Illness  The patient presents for evaluation of multiple medical concerns.    She reports experiencing an itchy rash under her breast that extends towards her back. The itching is so severe that it disrupts her sleep and causes pain.    Knees especially her left is causing severe pain and difficulty walking. She has seen orthopedics before and discussed possible steroid injection but wanted her to try medication first. Steroid significantly elevates her blood sugar. She cannot tolerate nsaids due to this decreasing her kidney function. Tramadol does not relieve her pain. Hydrocodone is helping some but needs new consult with ortho to determine plan.     She notes that her blood sugar levels have been generally well-controlled, but they have been slightly elevated recently. Her blood sugar was 101 this morning, but it was between 200 and 250 last week when the itching was particularly severe. She is currently on insulin and takes Trulicity once a week. A1C two months ago was 7.2.     She also requests a refill of her metoprolol prescription, as she took her last dose today and is unsure if she has any refills left.  Blood pressure and heart rate have been stable and at goal.  She is also taking Jardiance, which was prescribed by her cardiologist.    I have reviewed the following portions of the patient's history and confirmed they are accurate: allergies, current medications, past family history, past medical history, past social history, past surgical history, and problem list    Review of Systems  Pertinent items are noted in HPI.     Current Outpatient Medications on File Prior to Visit   Medication Sig    ACCU-CHEK SOFTCLIX LANCETS lancets USE TO TEST BLOOD GLUCOSE THREE TIMES DAILY AS DIRECTED    Aspirin 81 MG capsule Take 1 tablet by mouth Daily.    B-D UF III MINI PEN  NEEDLES 31G X 5 MM misc Use with insulin twice a day    B-D ULTRAFINE III SHORT PEN 31G X 8 MM misc Inject 1 unit marking on U-100 syringe as directed 2 (Two) Times a Day.    Blood Glucose Monitoring Suppl (ACCU-CHEK MARIO PLUS) w/Device kit use to test blood glucose three times daily    Cholecalciferol (Vitamin D) 50 MCG (2000 UT) tablet Take 1 tablet by mouth Daily.    Cyanocobalamin (B-12) 1000 MCG tablet Take 1 tablet by mouth Daily.    Dulaglutide 0.75 MG/0.5ML solution pen-injector Inject 0.75 mg under the skin into the appropriate area as directed 1 (One) Time Per Week.    glucose blood (OneTouch Verio) test strip TEST THREE TIMES A DAY E11.9    Insulin Lispro Prot & Lispro (HumaLOG Mix 75/25 KwikPen) (75-25) 100 UNIT/ML suspension pen-injector pen Inject 20 Units under the skin into the appropriate area as directed 2 (Two) Times a Day.    isosorbide mononitrate (IMDUR) 60 MG 24 hr tablet Take 1 tablet by mouth Every Morning.    lisinopril (PRINIVIL,ZESTRIL) 2.5 MG tablet TAKE 1 TABLET BY MOUTH EVERY DAY    methocarbamol (ROBAXIN) 500 MG tablet TAKE 1 TABLET BY MOUTH 3 TIMES A DAY AS NEEDED FOR MUSCLE SPASMS.    multivitamin (Multiple Vitamin) tablet tablet Take 1 tablet by mouth Daily.    pantoprazole (PROTONIX) 40 MG EC tablet TAKE 1 TABLET BY MOUTH EVERY DAY    QUEtiapine (SEROquel) 50 MG tablet Take 1 tablet by mouth every night at bedtime.    rosuvastatin (CRESTOR) 40 MG tablet Take 1 tablet by mouth Daily. 200001    sertraline (Zoloft) 100 MG tablet Take 1 tablet by mouth Daily.    Specialty Vitamins Products (HEALTHY HEART PO) Take 400 unit marking on U-100 syringe by mouth Daily.    traZODone (DESYREL) 150 MG tablet Take 1 tablet by mouth Every Night.    sertraline (ZOLOFT) 50 MG tablet TAKE 1 TABLET BY MOUTH EVERY DAY (Patient not taking: Reported on 11/22/2024)     No current facility-administered medications on file prior to visit.       Objective   Vitals:    11/22/24 1510   BP: 122/78   BP  "Location: Left arm   Patient Position: Sitting   Cuff Size: Large Adult   Pulse: 75   Temp: 97.4 °F (36.3 °C)   SpO2: 96%   Weight: 103 kg (227 lb 3.2 oz)   Height: 154.9 cm (60.98\")     Body mass index is 42.95 kg/m².    Physical Exam  Vitals reviewed.   Constitutional:       Appearance: Normal appearance. She is well-developed.   HENT:      Head: Normocephalic and atraumatic.      Nose: Nose normal.   Eyes:      General: Lids are normal.      Conjunctiva/sclera: Conjunctivae normal.      Pupils: Pupils are equal, round, and reactive to light.   Neck:      Thyroid: No thyromegaly.      Trachea: Trachea normal.   Cardiovascular:      Rate and Rhythm: Normal rate and regular rhythm.      Heart sounds: Normal heart sounds.   Pulmonary:      Effort: Pulmonary effort is normal. No respiratory distress.      Breath sounds: Normal breath sounds.   Musculoskeletal:      Lumbar back: Tenderness present.      Right knee: Tenderness present.      Left knee: Decreased range of motion. Tenderness present.   Skin:     General: Skin is warm and dry.      Comments: Red moist rash under breasts bilaterally with excoriation and skin breakdown.    Neurological:      Mental Status: She is alert and oriented to person, place, and time.      GCS: GCS eye subscore is 4. GCS verbal subscore is 5. GCS motor subscore is 6.   Psychiatric:         Attention and Perception: Attention normal.         Mood and Affect: Mood normal.         Speech: Speech normal.         Behavior: Behavior normal. Behavior is cooperative.         Thought Content: Thought content normal.         Results  Laboratory Studies  Lab Results   Component Value Date    HGBA1C 7.2 (A) 09/30/2024     Lab Results   Component Value Date    GLUCOSE 54 (L) 05/23/2024    BUN 15 05/23/2024    CREATININE 1.27 (H) 05/23/2024     05/23/2024    K 4.4 05/23/2024     05/23/2024    CALCIUM 9.5 05/23/2024    PROTEINTOT 7.0 05/23/2024    ALBUMIN 4.3 05/23/2024    ALT 14 " 05/23/2024    AST 18 05/23/2024    ALKPHOS 65 05/23/2024    BILITOT 0.3 05/23/2024    GLOB 2.7 05/23/2024    AGRATIO 1.6 05/23/2024    BCR 11.8 05/23/2024    ANIONGAP 13.0 05/23/2024    EGFR 47.3 (L) 05/23/2024         Assessment & Plan   Problem List Items Addressed This Visit       Type 2 diabetes mellitus - Primary    Relevant Medications    empagliflozin (Jardiance) 25 MG tablet tablet    Essential hypertension    Relevant Medications    metoprolol tartrate (LOPRESSOR) 50 MG tablet    Chronic pain of left knee    Relevant Medications    HYDROcodone-acetaminophen (NORCO) 7.5-325 MG per tablet    Other Relevant Orders    Ambulatory Referral to Orthopedic Surgery (Completed)     Other Visit Diagnoses       Skin yeast infection        Relevant Medications    nystatin (MYCOSTATIN) 828970 UNIT/GM cream    Primary osteoarthritis of left knee        Relevant Orders    Ambulatory Referral to Orthopedic Surgery (Completed)               Current Outpatient Medications:     ACCU-CHEK SOFTCLIX LANCETS lancets, USE TO TEST BLOOD GLUCOSE THREE TIMES DAILY AS DIRECTED, Disp: , Rfl: 5    Aspirin 81 MG capsule, Take 1 tablet by mouth Daily., Disp: 90 capsule, Rfl: 3    B-D UF III MINI PEN NEEDLES 31G X 5 MM misc, Use with insulin twice a day, Disp: 60 each, Rfl: 11    B-D ULTRAFINE III SHORT PEN 31G X 8 MM misc, Inject 1 unit marking on U-100 syringe as directed 2 (Two) Times a Day., Disp: 90 each, Rfl: 2    Blood Glucose Monitoring Suppl (ACCU-CHEK MARIO PLUS) w/Device kit, use to test blood glucose three times daily, Disp: , Rfl: 5    Cholecalciferol (Vitamin D) 50 MCG (2000 UT) tablet, Take 1 tablet by mouth Daily., Disp: 90 tablet, Rfl: 3    Cyanocobalamin (B-12) 1000 MCG tablet, Take 1 tablet by mouth Daily., Disp: 90 tablet, Rfl: 3    Dulaglutide 0.75 MG/0.5ML solution pen-injector, Inject 0.75 mg under the skin into the appropriate area as directed 1 (One) Time Per Week., Disp: 2 mL, Rfl: 1    glucose blood (OneTouch  Verio) test strip, TEST THREE TIMES A DAY E11.9, Disp: 100 each, Rfl: 1    HYDROcodone-acetaminophen (NORCO) 7.5-325 MG per tablet, Take 1 tablet by mouth 2 (Two) Times a Day As Needed for Moderate Pain., Disp: 60 tablet, Rfl: 0    Insulin Lispro Prot & Lispro (HumaLOG Mix 75/25 KwikPen) (75-25) 100 UNIT/ML suspension pen-injector pen, Inject 20 Units under the skin into the appropriate area as directed 2 (Two) Times a Day., Disp: 5 pen, Rfl: 3    isosorbide mononitrate (IMDUR) 60 MG 24 hr tablet, Take 1 tablet by mouth Every Morning., Disp: 90 tablet, Rfl: 2    lisinopril (PRINIVIL,ZESTRIL) 2.5 MG tablet, TAKE 1 TABLET BY MOUTH EVERY DAY, Disp: 90 tablet, Rfl: 3    methocarbamol (ROBAXIN) 500 MG tablet, TAKE 1 TABLET BY MOUTH 3 TIMES A DAY AS NEEDED FOR MUSCLE SPASMS., Disp: 90 tablet, Rfl: 3    metoprolol tartrate (LOPRESSOR) 50 MG tablet, Take 1 tablet by mouth 2 (Two) Times a Day., Disp: 180 tablet, Rfl: 2    multivitamin (Multiple Vitamin) tablet tablet, Take 1 tablet by mouth Daily., Disp: 90 tablet, Rfl: 3    pantoprazole (PROTONIX) 40 MG EC tablet, TAKE 1 TABLET BY MOUTH EVERY DAY, Disp: 90 tablet, Rfl: 1    QUEtiapine (SEROquel) 50 MG tablet, Take 1 tablet by mouth every night at bedtime., Disp: 90 tablet, Rfl: 3    rosuvastatin (CRESTOR) 40 MG tablet, Take 1 tablet by mouth Daily. 200001, Disp: 30 tablet, Rfl: 5    sertraline (Zoloft) 100 MG tablet, Take 1 tablet by mouth Daily., Disp: 90 tablet, Rfl: 1    Specialty Vitamins Products (HEALTHY HEART PO), Take 400 unit marking on U-100 syringe by mouth Daily., Disp: , Rfl:     traZODone (DESYREL) 150 MG tablet, Take 1 tablet by mouth Every Night., Disp: 90 tablet, Rfl: 1    empagliflozin (Jardiance) 25 MG tablet tablet, Take 1 tablet by mouth Daily., Disp: 90 tablet, Rfl: 1    nystatin (MYCOSTATIN) 516977 UNIT/GM cream, Apply 1 Application topically to the appropriate area as directed 2 (Two) Times a Day As Needed (rash)., Disp: 30 g, Rfl: 2    sertraline  (ZOLOFT) 50 MG tablet, TAKE 1 TABLET BY MOUTH EVERY DAY (Patient not taking: Reported on 11/22/2024), Disp: 30 tablet, Rfl: 2    sodium-potassium-magnesium sulfates (Suprep Bowel Prep Kit) 17.5-3.13-1.6 GM/177ML solution oral solution, Take 1 bottle by mouth Take As Directed. Follow instructions that were mailed to your home. If you didn't receive these call [phone]., Disp: 354 mL, Rfl: 0    Assessment & Plan  Increase Jardiance to 25 mg daily.  Continue Trulicity and Humalog mix.  Follow-up in 1 month for recheck of A1c.  Follow diabetic diet.  Continue lisinopril and metoprolol for hypertension.  Follow heart healthy low-cholesterol high-fiber diet.  Recheck fasting lipid panel and CMP at follow-up in 1 month.  Continue with Tylenol and hydrocodone as needed.  Discussed with patient to use hydrocodone sparingly.  Referring to orthopedics for consult.  Start nystatin cream and diflucan for yeast rash.  Encourage patient to use over-the-counter cornstarch powder to help keep this area dry.  Discussed importance of keeping blood sugar well-controlled and this will help minimize risk of getting skin yeast.    Patient educated on risks and side effects of taking hydrocodone including risk of addiction and sedation. Advised to use this medication sparingly.  Advised to not drive or operate heavy machinery when taking this medication. UDS and CSC completed. Kelvin reviewed and appropriate.         Follow-up  Return in 1 month for follow up.         Plan of care reviewed with the patient at the conclusion of today's visit.  Education was provided regarding diagnosis, management, and any prescribed or recommended OTC medications.  Patient verbalized understanding of and agreement with management plan.     Return in about 1 month (around 12/22/2024), or if symptoms worsen or fail to improve, for Follow-up.      Transcribed from ambient dictation for ROCCO Monroy by Sidra Moreno,  ROCCO.  12/01/24   00:38 EST    Patient or patient representative verbalized consent for the use of Ambient Listening during the visit with  ROCCO Monroy for chart documentation. 12/1/2024  00:48 EST

## 2024-12-02 ENCOUNTER — TELEPHONE (OUTPATIENT)
Dept: INTERNAL MEDICINE | Facility: CLINIC | Age: 64
End: 2024-12-02
Payer: MEDICARE

## 2024-12-02 DIAGNOSIS — I10 ESSENTIAL HYPERTENSION: ICD-10-CM

## 2024-12-02 RX ORDER — LISINOPRIL 2.5 MG/1
2.5 TABLET ORAL DAILY
Qty: 90 TABLET | Refills: 3 | Status: SHIPPED | OUTPATIENT
Start: 2024-12-02

## 2024-12-02 NOTE — TELEPHONE ENCOUNTER
Caller: Jayna Ambrocio    Relationship: Self    Best call back number: [phone]    Which medication are you concerned about: Lisinopril (PRINIVIL,ZESTRIL) 2.5 MG tablet     Who prescribed you this medication: ROCCO SOUZA     When did you start taking this medication: HAS BEEN ON FOR QUITE A WHILE     What are your concerns:  REPORTS THIS MEDICATION WAS SENT TO [facility] [address] BACK ON 08/2024 AND SHOULD HAVE BEEN SENT TO [facility] [place] AND IS HAVING ISSUES GETTING THIS TRANSFERRED TO [facility].     THE PATIENT IS REQUESTING THE PRACTICE RESEND TO:[facility] [id number] [id number] [place]  AT [facility] [address] - [phone]  [phone]     How long have you had these concerns: FOR THREE OR FOUR DAYS AND REPORTS SHE HAS BEEN OUT OF MEDICATION FOR 3 DAYS.

## 2024-12-19 ENCOUNTER — OFFICE VISIT (OUTPATIENT)
Dept: ORTHOPEDIC SURGERY | Facility: CLINIC | Age: 64
End: 2024-12-19
Payer: MEDICARE

## 2024-12-19 VITALS
DIASTOLIC BLOOD PRESSURE: 84 MMHG | SYSTOLIC BLOOD PRESSURE: 128 MMHG | BODY MASS INDEX: 44.56 KG/M2 | HEIGHT: 61 IN | WEIGHT: 236 LBS

## 2024-12-19 DIAGNOSIS — M17.0 PRIMARY OSTEOARTHRITIS OF BOTH KNEES: Primary | ICD-10-CM

## 2024-12-19 DIAGNOSIS — M25.561 RIGHT KNEE PAIN, UNSPECIFIED CHRONICITY: ICD-10-CM

## 2024-12-19 RX ORDER — TRIAMCINOLONE ACETONIDE 40 MG/ML
80 INJECTION, SUSPENSION INTRA-ARTICULAR; INTRAMUSCULAR
Status: COMPLETED | OUTPATIENT
Start: 2024-12-19 | End: 2024-12-19

## 2024-12-19 RX ORDER — LIDOCAINE HYDROCHLORIDE 10 MG/ML
3 INJECTION, SOLUTION EPIDURAL; INFILTRATION; INTRACAUDAL; PERINEURAL
Status: COMPLETED | OUTPATIENT
Start: 2024-12-19 | End: 2024-12-19

## 2024-12-19 RX ORDER — BUPIVACAINE HYDROCHLORIDE 2.5 MG/ML
3 INJECTION, SOLUTION EPIDURAL; INFILTRATION; INTRACAUDAL
Status: COMPLETED | OUTPATIENT
Start: 2024-12-19 | End: 2024-12-19

## 2024-12-19 RX ADMIN — BUPIVACAINE HYDROCHLORIDE 3 ML: 2.5 INJECTION, SOLUTION EPIDURAL; INFILTRATION; INTRACAUDAL at 10:50

## 2024-12-19 RX ADMIN — TRIAMCINOLONE ACETONIDE 80 MG: 40 INJECTION, SUSPENSION INTRA-ARTICULAR; INTRAMUSCULAR at 10:50

## 2024-12-19 RX ADMIN — LIDOCAINE HYDROCHLORIDE 3 ML: 10 INJECTION, SOLUTION EPIDURAL; INFILTRATION; INTRACAUDAL; PERINEURAL at 10:50

## 2024-12-19 NOTE — LETTER
December 19, 2024     ROCCO Bermudez  [address] [id number]    Patient: Jayna Ambrocio   YOB: 1960   Date of Visit: 12/19/2024       Dear ROCCO Bermudez:    Thank you for referring Jayna Ambrocio to me for evaluation. Below are the relevant portions of my assessment and plan of care.    Encounter Diagnosis and Orders:  Diagnoses and all orders for this visit:    1. Primary osteoarthritis of both knees (Primary)  -     - Large Joint Arthrocentesis: bilateral knee    2. Right knee pain, unspecified chronicity  -     XR Knee 4+ View Right        If you have questions, please do not hesitate to call me. I look forward to following Jayna along with you.         Sincerely,        Jordan Aguilar MD        CC: No Recipients

## 2024-12-19 NOTE — PROGRESS NOTES
Procedure   - Large Joint Arthrocentesis: bilateral knee on 12/19/2024 10:50 AM  Indications: pain  Details: 21 G needle, superolateral approach  Medications (Right): 3 mL lidocaine PF 1% 1 %; 3 mL bupivacaine (PF) 0.25 %; 80 mg triamcinolone acetonide 40 MG/ML  Medications (Left): 3 mL lidocaine PF 1% 1 %; 3 mL bupivacaine (PF) 0.25 %; 80 mg triamcinolone acetonide 40 MG/ML  Outcome: tolerated well, no immediate complications  Procedure, treatment alternatives, risks and benefits explained, specific risks discussed. Consent was given by the patient. Immediately prior to procedure a time out was called to verify the correct patient, procedure, equipment, support staff and site/side marked as required. Patient was prepped and draped in the usual sterile fashion.

## 2024-12-19 NOTE — PROGRESS NOTES
Orthopaedic Clinic Note: Knee New Patient    Chief Complaint   Patient presents with    Bilateral knee - Pain        HPI  Consult from: Sidra Rahman*    Jayna Ambrocio is a 64 y.o. female who presents with bilateral knee pain for 4 month(s). Onset atraumatic and gradual in nature. Pain is localized to the entire knee/globally and is a 10/10 on the pain scale. Pain is described as aching, burning, and shooting. Associated symptoms include pain, swelling, popping, grinding, and stiffness. The pain is worse with walking, standing, sitting, climbing stairs, sleeping, lying on affected side, rising from seated position, and any movement of the joint; pain medication and/or NSAID make it better. Previous treatments have included: nothing since symptom onset. Although some transient relief was reported with these interventions, these conservative measures have failed and symptoms have persisted. The patient is limited in daily activities and has had a significant decrease in quality of life as a result. She denies fevers, chills, or constitutional symptoms.    I have reviewed the following portions of the patient's history:History of Present Illness    Past Medical History:   Diagnosis Date    Arthritis     bilateral hands    Coronary artery disease     Diabetes mellitus     Heart failure     Migraines       Past Surgical History:   Procedure Laterality Date    CORONARY ARTERY BYPASS GRAFT  02/01/2018    4 vessel    FOOT SURGERY Left 03/15/2012      Family History   Problem Relation Age of Onset    Cancer Mother         Unsure what kind    Diabetes Mother     No Known Problems Father     Diabetes Sister     Cancer Sister         Unsure what kind    Breast cancer Sister 63    Coronary artery disease Brother     No Known Problems Maternal Aunt     No Known Problems Maternal Uncle     No Known Problems Paternal Aunt     No Known Problems Paternal Uncle     No Known Problems Maternal Grandmother     No Known  "Problems Maternal Grandfather     No Known Problems Paternal Grandmother     No Known Problems Paternal Grandfather     Diabetes Sister     No Known Problems Daughter     Diabetes Son     No Known Problems Son     Ovarian cancer Neg Hx      Social History     Socioeconomic History    Marital status: Single    Number of children: 3   Tobacco Use    Smoking status: Former     Current packs/day: 0.00     Average packs/day: 0.5 packs/day for 35.0 years (17.5 ttl pk-yrs)     Types: Cigarettes     Start date: 1980     Quit date: 2014     Years since quitting: 10.9     Passive exposure: Past    Smokeless tobacco: Never    Tobacco comments:     \"1 pack could last 2 weeks\"   Vaping Use    Vaping status: Never Used   Substance and Sexual Activity    Alcohol use: No    Drug use: No    Sexual activity: Not Currently     Partners: Male      Current Outpatient Medications on File Prior to Visit   Medication Sig Dispense Refill    ACCU-CHEK SOFTCLIX LANCETS lancets USE TO TEST BLOOD GLUCOSE THREE TIMES DAILY AS DIRECTED  5    Aspirin 81 MG capsule Take 1 tablet by mouth Daily. 90 capsule 3    B-D UF III MINI PEN NEEDLES 31G X 5 MM misc Use with insulin twice a day 60 each 11    B-D ULTRAFINE III SHORT PEN 31G X 8 MM misc Inject 1 unit marking on U-100 syringe as directed 2 (Two) Times a Day. 90 each 2    Blood Glucose Monitoring Suppl (ACCU-CHEK MARIO PLUS) w/Device kit use to test blood glucose three times daily  5    Cholecalciferol (Vitamin D) 50 MCG (2000 UT) tablet Take 1 tablet by mouth Daily. 90 tablet 3    Cyanocobalamin (B-12) 1000 MCG tablet Take 1 tablet by mouth Daily. 90 tablet 3    Dulaglutide 0.75 MG/0.5ML solution pen-injector Inject 0.75 mg under the skin into the appropriate area as directed 1 (One) Time Per Week. 2 mL 1    empagliflozin (Jardiance) 25 MG tablet tablet Take 1 tablet by mouth Daily. 90 tablet 1    glucose blood (OneTouch Verio) test strip TEST THREE TIMES A DAY E11.9 100 each 1    " HYDROcodone-acetaminophen (NORCO) 7.5-325 MG per tablet Take 1 tablet by mouth 2 (Two) Times a Day As Needed for Moderate Pain. 60 tablet 0    Insulin Lispro Prot & Lispro (HumaLOG Mix 75/25 KwikPen) (75-25) 100 UNIT/ML suspension pen-injector pen Inject 20 Units under the skin into the appropriate area as directed 2 (Two) Times a Day. 5 pen 3    isosorbide mononitrate (IMDUR) 60 MG 24 hr tablet Take 1 tablet by mouth Every Morning. 90 tablet 2    lisinopril (PRINIVIL,ZESTRIL) 2.5 MG tablet Take 1 tablet by mouth Daily. 90 tablet 3    methocarbamol (ROBAXIN) 500 MG tablet TAKE 1 TABLET BY MOUTH 3 TIMES A DAY AS NEEDED FOR MUSCLE SPASMS. 90 tablet 3    metoprolol tartrate (LOPRESSOR) 50 MG tablet Take 1 tablet by mouth 2 (Two) Times a Day. 180 tablet 2    multivitamin (Multiple Vitamin) tablet tablet Take 1 tablet by mouth Daily. 90 tablet 3    nystatin (MYCOSTATIN) 507814 UNIT/GM cream Apply 1 Application topically to the appropriate area as directed 2 (Two) Times a Day As Needed (rash). 30 g 2    pantoprazole (PROTONIX) 40 MG EC tablet TAKE 1 TABLET BY MOUTH EVERY DAY 90 tablet 1    QUEtiapine (SEROquel) 50 MG tablet Take 1 tablet by mouth every night at bedtime. 90 tablet 3    rosuvastatin (CRESTOR) 40 MG tablet Take 1 tablet by mouth Daily. 200001 30 tablet 5    sertraline (Zoloft) 100 MG tablet Take 1 tablet by mouth Daily. 90 tablet 1    sertraline (ZOLOFT) 50 MG tablet TAKE 1 TABLET BY MOUTH EVERY DAY 30 tablet 2    sodium-potassium-magnesium sulfates (Suprep Bowel Prep Kit) 17.5-3.13-1.6 GM/177ML solution oral solution Take 1 bottle by mouth Take As Directed. Follow instructions that were mailed to your home. If you didn't receive these call [phone]. 354 mL 0    Specialty Vitamins Products (HEALTHY HEART PO) Take 400 unit marking on U-100 syringe by mouth Daily.      traZODone (DESYREL) 150 MG tablet Take 1 tablet by mouth Every Night. 90 tablet 1     No current facility-administered medications on  "file prior to visit.      No Known Allergies     Review of Systems   Constitutional: Negative.    HENT: Negative.     Eyes: Negative.    Respiratory: Negative.     Cardiovascular: Negative.    Gastrointestinal: Negative.    Endocrine: Negative.    Genitourinary: Negative.    Musculoskeletal:  Positive for arthralgias.   Skin: Negative.    Allergic/Immunologic: Negative.    Neurological: Negative.    Hematological: Negative.    Psychiatric/Behavioral: Negative.          The patient's Review of Systems was personally reviewed and confirmed as accurate.    The following portions of the patient's history were reviewed and updated as appropriate: allergies, current medications, past family history, past medical history, past social history, past surgical history, and problem list.    Physical Exam  Blood pressure 128/84, height 154.9 cm (60.98\"), weight 107 kg (236 lb), not currently breastfeeding.    Body mass index is 44.62 kg/m².    GENERAL APPEARANCE: awake, alert & oriented x 3, in no acute distress and well developed, well nourished  PSYCH: normal affect  LUNGS:  breathing nonlabored  EYES: sclera anicteric  CARDIOVASCULAR: palpable dorsalis pedis, palpable posterior tibial bilaterally. Capillary refill less than 2 seconds  EXTREMITIES: no clubbing, cyanosis  GAIT:  Antalgic            Right Lower Extremity Exam:   ----------  Hip Exam  ----------  FLEXION CONTRACTURE: None  FLEXION: 110 degrees  INTERNAL ROTATION: 20 degrees at 90 degrees of flexion   EXTERNAL ROTATION: 40 degrees at 90 degrees of flexion    PAIN WITH HIP MOTION: no  ----------  Knee Exam  ----------  ALIGNMENT: moderate varus, correctible to neutral    RANGE OF MOTION:  Decreased (5 - 115 degrees) with no extensor lag  LIGAMENTOUS STABILITY:   stable to varus and valgus stress at terminal extension and 30 degrees; retensioning of the MCL is appreciated with valgus stress at 30 degrees consistent with medial compartment degeneration "     STRENGTH:  4/5 knee flexion, extension. 5/5 ankle dorsiflexion and plantarflexion.     PAIN WITH PALPATION: global  KNEE EFFUSION: yes, mild effusion  PAIN WITH KNEE ROM: yes, global  PATELLAR CREPITUS: yes, painful and symptomatic  SPECIAL EXAM FINDINGS:  none    REFLEXES:  PATELLAR 2+/4  ACHILLES 2+/4    CLONUS: no  STRAIGHT LEG TEST:   negative    SENSATION TO LIGHT TOUCH:  DEEP PERONEAL/SUPERFICIAL PERONEAL/SURAL/SAPHENOUS/TIBIAL:   intact    EDEMA:  no  ERYTHEMA:  no  WOUNDS/INCISIONS:  no        Left Lower Extremity Exam:   ----------  Hip Exam  ----------  FLEXION CONTRACTURE: None  FLEXION: 110 degrees  INTERNAL ROTATION: 20 degrees at 90 degrees of flexion   EXTERNAL ROTATION: 40 degrees at 90 degrees of flexion    PAIN WITH HIP MOTION: no  ----------  Knee Exam  ----------  ALIGNMENT: moderate varus, correctible to neutral    RANGE OF MOTION:  Decreased (5 - 115 degrees) with no extensor lag  LIGAMENTOUS STABILITY:   stable to varus and valgus stress at terminal extension and 30 degrees; retensioning of the MCL is appreciated with valgus stress at 30 degrees consistent with medial compartment degeneration     STRENGTH:  4/5 knee flexion, extension. 5/5 ankle dorsiflexion and plantarflexion.     PAIN WITH PALPATION: global  KNEE EFFUSION: yes, mild effusion  PAIN WITH KNEE ROM: yes, global  PATELLAR CREPITUS: yes, painful and symptomatic  SPECIAL EXAM FINDINGS:  none    REFLEXES:  PATELLAR 2+/4  ACHILLES 2+/4    CLONUS: no  STRAIGHT LEG TEST:   negative    SENSATION TO LIGHT TOUCH:  DEEP PERONEAL/SUPERFICIAL PERONEAL/SURAL/SAPHENOUS/TIBIAL:   intact    EDEMA:  no  ERYTHEMA:  no  WOUNDS/INCISIONS:  no    ______________________________________________________________________  ______________________________________________________________________    RADIOGRAPHIC FINDINGS:   Indication: Right knee pain    Comparison: No prior x-rays are available for comparison    Right bilateral knee(s) 4 views: moderate  to severe tricompartmental arthritis with genu varum alignment, periarticular osteophytes visualized in all compartments and Radiographs demonstrate worsening deformity with advancing arthritic changes and wear compared to prior radiographs.    Left knee x-rays from 7/24/2024 reveal mild to moderate tricompartmental arthritic changes with moderate joint effusion.    Labs from 5/3/2024 reveal A1c of 7.5, creatinine of 1.27, GFR 47.3.      Assessment/Plan:   Diagnosis Plan   1. Primary osteoarthritis of both knees        2. Right knee pain, unspecified chronicity  XR Knee 4+ View Right        Patient is suffering from osteoarthritis of the bilateral knees.  I discussed treatment options with her regarding her ongoing knee pain.  Due to her elevated creatinine, I do not recommend prescription anti-inflammatories.  She is agreeable to bilateral knee cortisone injections today.  She will follow-up in 3 months for repeat assessment.    Procedure Note:  I discussed with the patient the potential benefits of performing a therapeutic injections of the bilateral knees as well as potential risks including but not limited to infection, swelling, pain, bleeding, bruising, nerve/vessel damage, skin color changes, transient elevation in blood glucose levels, and fat atrophy. After informed consent and verifying correct patient, procedure site, and type of procedure, the areas were prepped with alcohol, ethyl chloride was used to numb the skin. Via the superolateral approach, 3 cc of 1% lidocaine, 3 cc of 0.25% Marcaine and 2 cc of 40mg/ml of Kenalog were each injected into the bilateral knees. The patient tolerated the procedures well. There were no complications. A sterile dressing was placed over each injection site.    Jordan Aguilar MD  12/19/24  10:48 EST

## 2025-01-02 DIAGNOSIS — G89.29 CHRONIC PAIN OF LEFT KNEE: ICD-10-CM

## 2025-01-02 DIAGNOSIS — M25.562 CHRONIC PAIN OF LEFT KNEE: ICD-10-CM

## 2025-01-02 RX ORDER — HYDROCODONE BITARTRATE AND ACETAMINOPHEN 7.5; 325 MG/1; MG/1
1 TABLET ORAL 2 TIMES DAILY PRN
Qty: 60 TABLET | Refills: 0 | Status: SHIPPED | OUTPATIENT
Start: 2025-01-02

## 2025-01-02 NOTE — TELEPHONE ENCOUNTER
Caller: Cristóbal Jaynaradha Arellano    Relationship: Self    Best call back number: [phone]     Requested Prescriptions:   Requested Prescriptions     Pending Prescriptions Disp Refills    HYDROcodone-acetaminophen (NORCO) 7.5-325 MG per tablet 60 tablet 0     Sig: Take 1 tablet by mouth 2 (Two) Times a Day As Needed for Moderate Pain.        Pharmacy where request should be sent: [facility] [id number] [address]  AT [facility] [address] - [phone]  - [phone] FX     Last office visit with prescribing clinician: 11/22/2024   Last telemedicine visit with prescribing clinician: Visit date not found   Next office visit with prescribing clinician: 1/10/2025     Additional details provided by patient: PATIENT IS NEARLY OUT OF MEDICATION      Does the patient have less than a 3 day supply:  [x] Yes  [] No    Would you like a call back once the refill request has been completed: [] Yes [x] No    If the office needs to give you a call back, can they leave a voicemail: [] Yes [x] No    Payton Torres Rep   01/02/25 12:43 EST

## 2025-01-03 DIAGNOSIS — K21.9 GASTROESOPHAGEAL REFLUX DISEASE WITHOUT ESOPHAGITIS: ICD-10-CM

## 2025-01-03 RX ORDER — SODIUM, POTASSIUM,MAG SULFATES 17.5-3.13G
1 SOLUTION, RECONSTITUTED, ORAL ORAL TAKE AS DIRECTED
Qty: 354 ML | Refills: 0 | Status: SHIPPED | OUTPATIENT
Start: 2025-01-03

## 2025-01-03 RX ORDER — PANTOPRAZOLE SODIUM 40 MG/1
40 TABLET, DELAYED RELEASE ORAL DAILY
Qty: 90 TABLET | Refills: 3 | Status: SHIPPED | OUTPATIENT
Start: 2025-01-03

## 2025-01-10 ENCOUNTER — LAB (OUTPATIENT)
Dept: INTERNAL MEDICINE | Facility: CLINIC | Age: 65
End: 2025-01-10
Payer: MEDICARE

## 2025-01-10 ENCOUNTER — OFFICE VISIT (OUTPATIENT)
Dept: INTERNAL MEDICINE | Facility: CLINIC | Age: 65
End: 2025-01-10
Payer: MEDICARE

## 2025-01-10 VITALS
OXYGEN SATURATION: 97 % | TEMPERATURE: 97.1 F | WEIGHT: 226.4 LBS | HEIGHT: 61 IN | HEART RATE: 84 BPM | BODY MASS INDEX: 42.74 KG/M2 | SYSTOLIC BLOOD PRESSURE: 124 MMHG | DIASTOLIC BLOOD PRESSURE: 84 MMHG

## 2025-01-10 DIAGNOSIS — F33.1 MAJOR DEPRESSIVE DISORDER, RECURRENT EPISODE, MODERATE DEGREE: ICD-10-CM

## 2025-01-10 DIAGNOSIS — E11.65 TYPE 2 DIABETES MELLITUS WITH HYPERGLYCEMIA, WITH LONG-TERM CURRENT USE OF INSULIN: Primary | ICD-10-CM

## 2025-01-10 DIAGNOSIS — M79.604 PAIN IN BOTH LOWER EXTREMITIES: ICD-10-CM

## 2025-01-10 DIAGNOSIS — N18.31 STAGE 3A CHRONIC KIDNEY DISEASE: ICD-10-CM

## 2025-01-10 DIAGNOSIS — I10 ESSENTIAL HYPERTENSION: ICD-10-CM

## 2025-01-10 DIAGNOSIS — E78.2 MIXED HYPERLIPIDEMIA: ICD-10-CM

## 2025-01-10 DIAGNOSIS — F51.01 PRIMARY INSOMNIA: ICD-10-CM

## 2025-01-10 DIAGNOSIS — M25.561 CHRONIC PAIN OF BOTH KNEES: ICD-10-CM

## 2025-01-10 DIAGNOSIS — M79.605 PAIN IN BOTH LOWER EXTREMITIES: ICD-10-CM

## 2025-01-10 DIAGNOSIS — E53.8 B12 DEFICIENCY: ICD-10-CM

## 2025-01-10 DIAGNOSIS — G89.29 CHRONIC PAIN OF BOTH KNEES: ICD-10-CM

## 2025-01-10 DIAGNOSIS — M25.562 CHRONIC PAIN OF BOTH KNEES: ICD-10-CM

## 2025-01-10 DIAGNOSIS — Z79.4 TYPE 2 DIABETES MELLITUS WITH HYPERGLYCEMIA, WITH LONG-TERM CURRENT USE OF INSULIN: Primary | ICD-10-CM

## 2025-01-10 LAB
BASOPHILS # BLD AUTO: 0.04 10*3/MM3 (ref 0–0.2)
BASOPHILS NFR BLD AUTO: 0.6 % (ref 0–1.5)
DEPRECATED RDW RBC AUTO: 42 FL (ref 37–54)
EOSINOPHIL # BLD AUTO: 0.11 10*3/MM3 (ref 0–0.4)
EOSINOPHIL NFR BLD AUTO: 1.7 % (ref 0.3–6.2)
ERYTHROCYTE [DISTWIDTH] IN BLOOD BY AUTOMATED COUNT: 13 % (ref 12.3–15.4)
EXPIRATION DATE: ABNORMAL
HBA1C MFR BLD: 9.4 % (ref 4.5–5.7)
HCT VFR BLD AUTO: 42.2 % (ref 34–46.6)
HGB BLD-MCNC: 13.8 G/DL (ref 12–15.9)
IMM GRANULOCYTES # BLD AUTO: 0.02 10*3/MM3 (ref 0–0.05)
IMM GRANULOCYTES NFR BLD AUTO: 0.3 % (ref 0–0.5)
LYMPHOCYTES # BLD AUTO: 1.7 10*3/MM3 (ref 0.7–3.1)
LYMPHOCYTES NFR BLD AUTO: 26.3 % (ref 19.6–45.3)
Lab: ABNORMAL
MCH RBC QN AUTO: 29.1 PG (ref 26.6–33)
MCHC RBC AUTO-ENTMCNC: 32.7 G/DL (ref 31.5–35.7)
MCV RBC AUTO: 89 FL (ref 79–97)
MONOCYTES # BLD AUTO: 0.54 10*3/MM3 (ref 0.1–0.9)
MONOCYTES NFR BLD AUTO: 8.4 % (ref 5–12)
NEUTROPHILS NFR BLD AUTO: 4.05 10*3/MM3 (ref 1.7–7)
NEUTROPHILS NFR BLD AUTO: 62.7 % (ref 42.7–76)
NRBC BLD AUTO-RTO: 0 /100 WBC (ref 0–0.2)
PLATELET # BLD AUTO: 229 10*3/MM3 (ref 140–450)
PMV BLD AUTO: 10.3 FL (ref 6–12)
RBC # BLD AUTO: 4.74 10*6/MM3 (ref 3.77–5.28)
WBC NRBC COR # BLD AUTO: 6.46 10*3/MM3 (ref 3.4–10.8)

## 2025-01-10 PROCEDURE — 1125F AMNT PAIN NOTED PAIN PRSNT: CPT | Performed by: NURSE PRACTITIONER

## 2025-01-10 PROCEDURE — 80053 COMPREHEN METABOLIC PANEL: CPT | Performed by: NURSE PRACTITIONER

## 2025-01-10 PROCEDURE — 83036 HEMOGLOBIN GLYCOSYLATED A1C: CPT | Performed by: NURSE PRACTITIONER

## 2025-01-10 PROCEDURE — 1159F MED LIST DOCD IN RCRD: CPT | Performed by: NURSE PRACTITIONER

## 2025-01-10 PROCEDURE — 3074F SYST BP LT 130 MM HG: CPT | Performed by: NURSE PRACTITIONER

## 2025-01-10 PROCEDURE — 82607 VITAMIN B-12: CPT | Performed by: NURSE PRACTITIONER

## 2025-01-10 PROCEDURE — 3079F DIAST BP 80-89 MM HG: CPT | Performed by: NURSE PRACTITIONER

## 2025-01-10 PROCEDURE — 80061 LIPID PANEL: CPT | Performed by: NURSE PRACTITIONER

## 2025-01-10 PROCEDURE — 1160F RVW MEDS BY RX/DR IN RCRD: CPT | Performed by: NURSE PRACTITIONER

## 2025-01-10 PROCEDURE — 99214 OFFICE O/P EST MOD 30 MIN: CPT | Performed by: NURSE PRACTITIONER

## 2025-01-10 PROCEDURE — 36415 COLL VENOUS BLD VENIPUNCTURE: CPT | Performed by: NURSE PRACTITIONER

## 2025-01-10 PROCEDURE — 82746 ASSAY OF FOLIC ACID SERUM: CPT | Performed by: NURSE PRACTITIONER

## 2025-01-10 PROCEDURE — 3046F HEMOGLOBIN A1C LEVEL >9.0%: CPT | Performed by: NURSE PRACTITIONER

## 2025-01-10 PROCEDURE — 85025 COMPLETE CBC W/AUTO DIFF WBC: CPT | Performed by: NURSE PRACTITIONER

## 2025-01-10 RX ORDER — GABAPENTIN 300 MG/1
300 CAPSULE ORAL NIGHTLY PRN
Qty: 30 CAPSULE | Refills: 2 | Status: SHIPPED | OUTPATIENT
Start: 2025-01-10

## 2025-01-10 RX ORDER — QUETIAPINE FUMARATE 100 MG/1
100 TABLET, FILM COATED ORAL NIGHTLY
Qty: 90 TABLET | Refills: 1 | Status: SHIPPED | OUTPATIENT
Start: 2025-01-10

## 2025-01-10 RX ORDER — DULAGLUTIDE 1.5 MG/.5ML
1.5 INJECTION, SOLUTION SUBCUTANEOUS WEEKLY
Qty: 2 ML | Refills: 2 | Status: SHIPPED | OUTPATIENT
Start: 2025-01-10

## 2025-01-10 NOTE — PROGRESS NOTES
Subjective   Chief Complaint   Patient presents with    Type 2 diabetes mellitus without complication        Jayna Ambrocio is a 64 y.o. female.    History of Present Illness  The patient presents for evaluation of knee pain, diabetes, anxiety and depression, and sleep issues.    She reports that the injections administered to her knees have not provided significant relief. The left knee experienced temporary improvement, but the right knee remained unaffected. She has an upcoming appointment with her orthopedic specialist in March 2024. She notes that her right knee is more severely affected than the left. Her sleep is frequently disrupted due to knee pain, which she describes as radiating throughout her legs. She uses a cane for mobility but finds it exacerbates her pain. She recalls that her initial knee issues were with the left knee, but after receiving injections, the right knee became more problematic, to the point where she was unable to stand for x-rays. Ambulation exacerbates her knee pain, while rest appears to alleviate it. Hydrocodone greatly helps manage her pain but she understands this is not long term script and she will most likely need joint replacement surgery in the future.     She acknowledges a slight increase in her blood glucose levels, which she attributes to increased food intake during the holiday season. She reports no symptoms of hyperglycemia such as headaches or blurred vision. She has noticed an increase in urinary frequency, which she believes may be due to increased water intake. She reports no nausea. Her current medication regimen includes Jardiance, Trulicity, Humalog mix, and a weekly injection, which she administers every Monday. She requests refills of all her medications.    She reports that her current dose of Seroquel does not adequately manage her sleep disturbances. She is currently on a 50 mg dose of Seroquel and is open to increasing the dosage. She reports no  adverse effects from the medication.    She reports that her current dose of sertraline is effectively managing her anxiety and depression.    Kidney function has continued to drop down. She was referred to nephrology 6 months ago and saw Dr. Llanos in September.     I have reviewed the following portions of the patient's history and confirmed they are accurate: allergies, current medications, past family history, past medical history, past social history, past surgical history, and problem list    Review of Systems  Pertinent items are noted in HPI.     Current Outpatient Medications on File Prior to Visit   Medication Sig    ACCU-CHEK SOFTCLIX LANCETS lancets USE TO TEST BLOOD GLUCOSE THREE TIMES DAILY AS DIRECTED    Aspirin 81 MG capsule Take 1 tablet by mouth Daily.    B-D UF III MINI PEN NEEDLES 31G X 5 MM misc Use with insulin twice a day    B-D ULTRAFINE III SHORT PEN 31G X 8 MM misc Inject 1 unit marking on U-100 syringe as directed 2 (Two) Times a Day.    Blood Glucose Monitoring Suppl (ACCU-CHEK MARIO PLUS) w/Device kit use to test blood glucose three times daily    Cholecalciferol (Vitamin D) 50 MCG (2000 UT) tablet Take 1 tablet by mouth Daily.    empagliflozin (Jardiance) 25 MG tablet tablet Take 1 tablet by mouth Daily.    glucose blood (OneTouch Verio) test strip TEST THREE TIMES A DAY E11.9    HYDROcodone-acetaminophen (NORCO) 7.5-325 MG per tablet Take 1 tablet by mouth 2 (Two) Times a Day As Needed for Moderate Pain.    Insulin Lispro Prot & Lispro (HumaLOG Mix 75/25 KwikPen) (75-25) 100 UNIT/ML suspension pen-injector pen Inject 20 Units under the skin into the appropriate area as directed 2 (Two) Times a Day.    isosorbide mononitrate (IMDUR) 60 MG 24 hr tablet Take 1 tablet by mouth Every Morning.    lisinopril (PRINIVIL,ZESTRIL) 2.5 MG tablet Take 1 tablet by mouth Daily.    methocarbamol (ROBAXIN) 500 MG tablet TAKE 1 TABLET BY MOUTH 3 TIMES A DAY AS NEEDED FOR MUSCLE SPASMS.    metoprolol  "tartrate (LOPRESSOR) 50 MG tablet Take 1 tablet by mouth 2 (Two) Times a Day.    multivitamin (Multiple Vitamin) tablet tablet Take 1 tablet by mouth Daily.    nystatin (MYCOSTATIN) 842540 UNIT/GM cream Apply 1 Application topically to the appropriate area as directed 2 (Two) Times a Day As Needed (rash).    pantoprazole (PROTONIX) 40 MG EC tablet TAKE 1 TABLET BY MOUTH EVERY DAY    rosuvastatin (CRESTOR) 40 MG tablet Take 1 tablet by mouth Daily. 200001    sertraline (Zoloft) 100 MG tablet Take 1 tablet by mouth Daily.    sertraline (ZOLOFT) 50 MG tablet TAKE 1 TABLET BY MOUTH EVERY DAY    Specialty Vitamins Products (HEALTHY HEART PO) Take 400 unit marking on U-100 syringe by mouth Daily.    traZODone (DESYREL) 150 MG tablet Take 1 tablet by mouth Every Night.    Cyanocobalamin (B-12) 1000 MCG tablet Take 1 tablet by mouth Daily.    sodium-potassium-magnesium sulfates (Suprep Bowel Prep Kit) 17.5-3.13-1.6 GM/177ML solution oral solution Take 1 bottle by mouth Take As Directed. Follow instructions that were mailed to your home. If you didn't receive these call [phone]. (Patient not taking: Reported on 1/10/2025)    sodium-potassium-magnesium sulfates (Suprep Bowel Prep Kit) 17.5-3.13-1.6 GM/177ML solution oral solution Take 1 bottle by mouth Take As Directed. (Patient not taking: Reported on 1/10/2025)     No current facility-administered medications on file prior to visit.       Objective   Vitals:    01/10/25 0922   BP: 124/84   BP Location: Left arm   Patient Position: Sitting   Cuff Size: Adult  Comment: lower arm   Pulse: 84   Temp: 97.1 °F (36.2 °C)   SpO2: 97%   Weight: 103 kg (226 lb 6.4 oz)   Height: 154.9 cm (60.98\")     Body mass index is 42.8 kg/m².    Physical Exam  Vitals reviewed.   Constitutional:       Appearance: Normal appearance. She is well-developed.   HENT:      Head: Normocephalic and atraumatic.      Nose: Nose normal.   Eyes:      General: Lids are normal.      Conjunctiva/sclera: " Conjunctivae normal.      Pupils: Pupils are equal, round, and reactive to light.   Neck:      Thyroid: No thyromegaly.      Trachea: Trachea normal.   Cardiovascular:      Rate and Rhythm: Normal rate and regular rhythm.      Heart sounds: Normal heart sounds.   Pulmonary:      Effort: Pulmonary effort is normal. No respiratory distress.      Breath sounds: Normal breath sounds.   Musculoskeletal:      Right knee: Decreased range of motion. Tenderness present.      Left knee: Decreased range of motion. Tenderness present.   Skin:     General: Skin is warm and dry.   Neurological:      Mental Status: She is alert and oriented to person, place, and time.      GCS: GCS eye subscore is 4. GCS verbal subscore is 5. GCS motor subscore is 6.   Psychiatric:         Attention and Perception: Attention normal.         Mood and Affect: Mood normal.         Speech: Speech normal.         Behavior: Behavior normal. Behavior is cooperative.         Thought Content: Thought content normal.         Results  Laboratory Studies  Blood sugar is 9.4.    Lab Results   Component Value Date    WBC 6.46 01/10/2025    HGB 13.8 01/10/2025    HCT 42.2 01/10/2025    MCV 89.0 01/10/2025     01/10/2025      Lab Results   Component Value Date    GLUCOSE 85 01/10/2025    BUN 24 (H) 01/10/2025    CREATININE 1.58 (H) 01/10/2025     01/10/2025    K 4.3 01/10/2025     01/10/2025    CALCIUM 9.7 01/10/2025    PROTEINTOT 6.9 01/10/2025    ALBUMIN 3.8 01/10/2025    ALT 39 (H) 01/10/2025    AST 26 01/10/2025    ALKPHOS 69 01/10/2025    BILITOT 0.3 01/10/2025    GLOB 3.1 01/10/2025    AGRATIO 1.2 01/10/2025    BCR 15.2 01/10/2025    ANIONGAP 11.2 01/10/2025    EGFR 36.2 (L) 01/10/2025      Lab Results   Component Value Date    HGBA1C 9.4 (A) 01/10/2025      Lab Results   Component Value Date    CHOL 190 01/10/2025    TRIG 77 01/10/2025    HDL 79 (H) 01/10/2025    LDL 97 01/10/2025      Lab Results   Component Value Date    TSH 1.700  05/23/2024          Assessment & Plan   Problem List Items Addressed This Visit       Type 2 diabetes mellitus - Primary    Relevant Medications    Dulaglutide (Trulicity) 1.5 MG/0.5ML solution auto-injector    Other Relevant Orders    POC Glycosylated Hemoglobin (Hb A1C) (Completed)    CBC Auto Differential (Completed)    Comprehensive Metabolic Panel (Completed)    Essential hypertension    Relevant Orders    CBC Auto Differential (Completed)    Comprehensive Metabolic Panel (Completed)    Lipid Panel (Completed)    Insomnia    Mixed hyperlipidemia    Relevant Orders    CBC Auto Differential (Completed)    Comprehensive Metabolic Panel (Completed)    Lipid Panel (Completed)     Other Visit Diagnoses       Pain in both lower extremities        Relevant Medications    gabapentin (NEURONTIN) 300 MG capsule    Stage 3a chronic kidney disease        Relevant Orders    Comprehensive Metabolic Panel (Completed)    Major depressive disorder, recurrent episode, moderate degree        Relevant Medications    QUEtiapine (SEROquel) 100 MG tablet    B12 deficiency        Relevant Orders    CBC Auto Differential (Completed)    Vitamin B12 & Folate (Completed)    Chronic pain of both knees                   Current Outpatient Medications:     ACCU-CHEK SOFTCLIX LANCETS lancets, USE TO TEST BLOOD GLUCOSE THREE TIMES DAILY AS DIRECTED, Disp: , Rfl: 5    Aspirin 81 MG capsule, Take 1 tablet by mouth Daily., Disp: 90 capsule, Rfl: 3    B-D UF III MINI PEN NEEDLES 31G X 5 MM misc, Use with insulin twice a day, Disp: 60 each, Rfl: 11    B-D ULTRAFINE III SHORT PEN 31G X 8 MM misc, Inject 1 unit marking on U-100 syringe as directed 2 (Two) Times a Day., Disp: 90 each, Rfl: 2    Blood Glucose Monitoring Suppl (ACCU-CHEK MARIO PLUS) w/Device kit, use to test blood glucose three times daily, Disp: , Rfl: 5    Cholecalciferol (Vitamin D) 50 MCG (2000 UT) tablet, Take 1 tablet by mouth Daily., Disp: 90 tablet, Rfl: 3    empagliflozin  (Jardiance) 25 MG tablet tablet, Take 1 tablet by mouth Daily., Disp: 90 tablet, Rfl: 1    glucose blood (OneTouch Verio) test strip, TEST THREE TIMES A DAY E11.9, Disp: 100 each, Rfl: 1    HYDROcodone-acetaminophen (NORCO) 7.5-325 MG per tablet, Take 1 tablet by mouth 2 (Two) Times a Day As Needed for Moderate Pain., Disp: 60 tablet, Rfl: 0    Insulin Lispro Prot & Lispro (HumaLOG Mix 75/25 KwikPen) (75-25) 100 UNIT/ML suspension pen-injector pen, Inject 20 Units under the skin into the appropriate area as directed 2 (Two) Times a Day., Disp: 5 pen, Rfl: 3    isosorbide mononitrate (IMDUR) 60 MG 24 hr tablet, Take 1 tablet by mouth Every Morning., Disp: 90 tablet, Rfl: 2    lisinopril (PRINIVIL,ZESTRIL) 2.5 MG tablet, Take 1 tablet by mouth Daily., Disp: 90 tablet, Rfl: 3    methocarbamol (ROBAXIN) 500 MG tablet, TAKE 1 TABLET BY MOUTH 3 TIMES A DAY AS NEEDED FOR MUSCLE SPASMS., Disp: 90 tablet, Rfl: 3    metoprolol tartrate (LOPRESSOR) 50 MG tablet, Take 1 tablet by mouth 2 (Two) Times a Day., Disp: 180 tablet, Rfl: 2    multivitamin (Multiple Vitamin) tablet tablet, Take 1 tablet by mouth Daily., Disp: 90 tablet, Rfl: 3    nystatin (MYCOSTATIN) 062264 UNIT/GM cream, Apply 1 Application topically to the appropriate area as directed 2 (Two) Times a Day As Needed (rash)., Disp: 30 g, Rfl: 2    pantoprazole (PROTONIX) 40 MG EC tablet, TAKE 1 TABLET BY MOUTH EVERY DAY, Disp: 90 tablet, Rfl: 3    rosuvastatin (CRESTOR) 40 MG tablet, Take 1 tablet by mouth Daily. 200001, Disp: 30 tablet, Rfl: 5    sertraline (Zoloft) 100 MG tablet, Take 1 tablet by mouth Daily., Disp: 90 tablet, Rfl: 1    sertraline (ZOLOFT) 50 MG tablet, TAKE 1 TABLET BY MOUTH EVERY DAY, Disp: 30 tablet, Rfl: 2    Specialty Vitamins Products (HEALTHY HEART PO), Take 400 unit marking on U-100 syringe by mouth Daily., Disp: , Rfl:     traZODone (DESYREL) 150 MG tablet, Take 1 tablet by mouth Every Night., Disp: 90 tablet, Rfl: 1    Cyanocobalamin (B-12)  1000 MCG tablet, Take 1 tablet by mouth Daily., Disp: 90 tablet, Rfl: 3    Dulaglutide (Trulicity) 1.5 MG/0.5ML solution auto-injector, Inject 1.5 mg under the skin into the appropriate area as directed 1 (One) Time Per Week., Disp: 2 mL, Rfl: 2    gabapentin (NEURONTIN) 300 MG capsule, Take 1 capsule by mouth At Night As Needed (leg pain)., Disp: 30 capsule, Rfl: 2    QUEtiapine (SEROquel) 100 MG tablet, Take 1 tablet by mouth Every Night., Disp: 90 tablet, Rfl: 1    sodium-potassium-magnesium sulfates (Suprep Bowel Prep Kit) 17.5-3.13-1.6 GM/177ML solution oral solution, Take 1 bottle by mouth Take As Directed. Follow instructions that were mailed to your home. If you didn't receive these call [phone]. (Patient not taking: Reported on 1/10/2025), Disp: 354 mL, Rfl: 0    sodium-potassium-magnesium sulfates (Suprep Bowel Prep Kit) 17.5-3.13-1.6 GM/177ML solution oral solution, Take 1 bottle by mouth Take As Directed. (Patient not taking: Reported on 1/10/2025), Disp: 354 mL, Rfl: 0    Assessment & Plan  1. Knee pain.  The patient reports persistent knee pain, particularly in the right knee, which worsens with walking. Previous injections provided minimal relief. Gabapentin 300 mg will be added to her current hydrocodone regimen to help with pain and sleep. Goal would be to continue with knee injections and discontinue hydrocodone. She is advised to take gabapentin at bedtime initially due to its potential sedative effects. If she experiences any adverse effects, she should discontinue use.     2. Diabetes mellitus and kidney disease  Her blood glucose levels have increased to 9.4, likely due to dietary changes over the holidays. The dosage of her once-weekly injection will be increased from 0.75 mg to 1.5 mg. She is advised to continue her current regimen of Jardiance and insulin. A prescription for a 90-day supply of her medications will be provided. Laboratory tests will be conducted today to monitor her  kidney function. If her blood glucose levels remain elevated, she should inform us so that we can adjust her medication dosage accordingly. Continue follow ups with endocrinology and nephrology.     3. Anxiety and depression.  She reports that sertraline is working well for her anxiety and depression. No changes to her current medication regimen are necessary at this time.    4. Sleep issues.  The dosage of her Seroquel will be increased from 50 mg to 100 mg to help with sleep. If the increased dosage makes her too sleepy, she should inform us.    5. Hypertension and hyperlipidemia  Continue lisinopril and metoprolol for hypertension. Continue crestor for hyperlipidemia. Follow low cholesterol, high fiber diet. Checking lipid panel and cmp. Continue follow ups with cardiology.     Patient educated on risks and side effects of taking hydrocodone and gabapentin including risk of addiction and sedation. Advised to use this medication sparingly.  Advised to not drive or operate heavy machinery when taking this medication. UDS and CSC completed. Kelvin reviewed and appropriate.       Follow-up  The patient will follow up in 3 months.    PROCEDURE  The patient received knee injections in the past, with temporary improvement in the left knee but no effect on the right knee.         Plan of care reviewed with the patient at the conclusion of today's visit.  Education was provided regarding diagnosis, management, and any prescribed or recommended OTC medications.  Patient verbalized understanding of and agreement with management plan.     Return in about 3 months (around 4/10/2025), or if symptoms worsen or fail to improve, for Follow-up.      Transcribed from ambient dictation for ROCCO Monroy by ROCCO Monroy.  01/10/25   09:43 EST    Patient or patient representative verbalized consent for the use of Ambient Listening during the visit with  ROCCO Monroy for chart  documentation. 1/13/2025  10:00 EST

## 2025-01-11 LAB
ALBUMIN SERPL-MCNC: 3.8 G/DL (ref 3.5–5.2)
ALBUMIN/GLOB SERPL: 1.2 G/DL
ALP SERPL-CCNC: 69 U/L (ref 39–117)
ALT SERPL W P-5'-P-CCNC: 39 U/L (ref 1–33)
ANION GAP SERPL CALCULATED.3IONS-SCNC: 11.2 MMOL/L (ref 5–15)
AST SERPL-CCNC: 26 U/L (ref 1–32)
BILIRUB SERPL-MCNC: 0.3 MG/DL (ref 0–1.2)
BUN SERPL-MCNC: 24 MG/DL (ref 8–23)
BUN/CREAT SERPL: 15.2 (ref 7–25)
CALCIUM SPEC-SCNC: 9.7 MG/DL (ref 8.6–10.5)
CHLORIDE SERPL-SCNC: 103 MMOL/L (ref 98–107)
CHOLEST SERPL-MCNC: 190 MG/DL (ref 0–200)
CO2 SERPL-SCNC: 24.8 MMOL/L (ref 22–29)
CREAT SERPL-MCNC: 1.58 MG/DL (ref 0.57–1)
EGFRCR SERPLBLD CKD-EPI 2021: 36.2 ML/MIN/1.73
FOLATE SERPL-MCNC: 19.4 NG/ML (ref 4.78–24.2)
GLOBULIN UR ELPH-MCNC: 3.1 GM/DL
GLUCOSE SERPL-MCNC: 85 MG/DL (ref 65–99)
HDLC SERPL-MCNC: 79 MG/DL (ref 40–60)
LDLC SERPL CALC-MCNC: 97 MG/DL (ref 0–100)
LDLC/HDLC SERPL: 1.21 {RATIO}
POTASSIUM SERPL-SCNC: 4.3 MMOL/L (ref 3.5–5.2)
PROT SERPL-MCNC: 6.9 G/DL (ref 6–8.5)
SODIUM SERPL-SCNC: 139 MMOL/L (ref 136–145)
TRIGL SERPL-MCNC: 77 MG/DL (ref 0–150)
VIT B12 BLD-MCNC: 363 PG/ML (ref 211–946)
VLDLC SERPL-MCNC: 14 MG/DL (ref 5–40)

## 2025-01-15 ENCOUNTER — TELEPHONE (OUTPATIENT)
Dept: INTERNAL MEDICINE | Facility: CLINIC | Age: 65
End: 2025-01-15
Payer: MEDICARE

## 2025-01-15 NOTE — TELEPHONE ENCOUNTER
Caller: Jayna Ambrocio    Relationship: Self    Best call back number: [phone]     Which medication are you concerned about: gabapentin (NEURONTIN) 300 MG capsule     Who prescribed you this medication: Sidra Cobos APRN     When did you start taking this medication: 1/13/2025    What are your concerns: PATIENT TOOK 1 CAPSULE ON MONDAY NIGHT AND SHE COULDN'T GET UP OUT THE BED AND MOVING VERY SLOW. PATIENT IS USUALLY A MORNING PERSON AND UP BY 7 AM AND SHE DIDN'T GET UP TILL 11:30 AM.  PATIENT DOES NOT LIKE HOW THIS MEDICATION MAKES HER FEEL.  PATIENT WENT TO WASH SOME CLOTHES AND THE WASHER LID FELL ON HER HEAD.  THIS IS THE FIRST TIME THIS HAS EVERY HAPPENED TO HER.  CAN SHE TAKE SOMETHING ELSE?  SHE HAS NOT TAKEN ANY MORE OF THE GABAPENTIN.         PATIENT HAS HYDROcodone-acetaminophen (NORCO) 7.5-325 MG per tablet AND WOULD LIKE TO CONTINUE TAKING THIS MEDICATION.  PATIENT HAS TO TAKE 3 TABLETS OF THESE TO HELP WITH HER PAIN.  SHE IS NOT GOING TO TAKE THE GABAPENTIN ANYMORE.      PLEASE CALL AND ADVISE

## 2025-01-15 NOTE — TELEPHONE ENCOUNTER
Contact patient and advise:    Hydrocodone is a schedule II narcotic and I'm not able to increase the amount she is taking. This is not meant to be used chronically but was trying to help her with pain until orthopedics could try the injections or surgery. I'll need to see her to discuss other options to add on if she needs additional pain control.

## 2025-01-17 ENCOUNTER — TELEPHONE (OUTPATIENT)
Dept: INTERNAL MEDICINE | Facility: CLINIC | Age: 65
End: 2025-01-17
Payer: MEDICARE

## 2025-01-22 RX ORDER — SODIUM, POTASSIUM,MAG SULFATES 17.5-3.13G
1 SOLUTION, RECONSTITUTED, ORAL ORAL TAKE AS DIRECTED
Qty: 354 ML | Refills: 0 | Status: SHIPPED | OUTPATIENT
Start: 2025-01-22

## 2025-01-29 ENCOUNTER — TELEPHONE (OUTPATIENT)
Dept: INTERNAL MEDICINE | Facility: CLINIC | Age: 65
End: 2025-01-29
Payer: MEDICARE

## 2025-01-29 DIAGNOSIS — I10 ESSENTIAL HYPERTENSION: ICD-10-CM

## 2025-01-29 RX ORDER — METOPROLOL TARTRATE 50 MG
50 TABLET ORAL 2 TIMES DAILY
Qty: 180 TABLET | Refills: 2 | Status: SHIPPED | OUTPATIENT
Start: 2025-01-29

## 2025-01-29 RX ORDER — LISINOPRIL 2.5 MG/1
2.5 TABLET ORAL DAILY
Qty: 90 TABLET | Refills: 3 | Status: SHIPPED | OUTPATIENT
Start: 2025-01-29

## 2025-01-29 NOTE — TELEPHONE ENCOUNTER
Caller: Jayna Ambrocio    Relationship: Self    Best call back number:      [phone] (Home)     Requested Prescriptions:     BLOOD PRESSURE MEDICATION    PATIENT REQUESTED A CALL BACK WITH CONFIRMATION OF THE NAME AND MG FOR THE MEDICATION     Pharmacy where request should be sent:      [facility] [place]    TELEPHONE CONTACT:    [phone]    Last office visit with prescribing clinician: 1/10/2025   Last telemedicine visit with prescribing clinician: Visit date not found   Next office visit with prescribing clinician: 4/11/2025     Additional details provided by patient:     PATIENT STATED SHE IS OUT OF THE MEDICATION    Does the patient have less than a 3 day supply:  [x] Yes  [] No    Would you like a call back once the refill request has been completed: [] Yes [] No    If the office needs to give you a call back, can they leave a voicemail: [] Yes [] No    Payton Rodriguez Rep   01/29/25 14:48 EST

## 2025-02-04 DIAGNOSIS — M25.562 CHRONIC PAIN OF LEFT KNEE: ICD-10-CM

## 2025-02-04 DIAGNOSIS — G89.29 CHRONIC PAIN OF LEFT KNEE: ICD-10-CM

## 2025-02-04 NOTE — TELEPHONE ENCOUNTER
Caller: Cristóbal Jaynaradha Arellano    Relationship: Self    Best call back number:       [phone] (Home)     Requested Prescriptions:   Requested Prescriptions     Pending Prescriptions Disp Refills    HYDROcodone-acetaminophen (NORCO) 7.5-325 MG per tablet 60 tablet 0     Sig: Take 1 tablet by mouth 2 (Two) Times a Day As Needed for Moderate Pain.      Pharmacy where request should be sent:     [facility] [id number]  AT [facility] [address] - [phone]  - [phone] FX     Last office visit with prescribing clinician: 1/10/2025   Last telemedicine visit with prescribing clinician: Visit date not found   Next office visit with prescribing clinician: 4/11/2025     Additional details provided by patient:     PATIENT STATED SHE HAS (1) DAY SUPPLY LEFT AND IS STILL EXPERIENCING A LOT OF PAIN IN BOTH KNEES    Does the patient have less than a 3 day supply:  [x] Yes  [] No    Would you like a call back once the refill request has been completed: [] Yes [] No    If the office needs to give you a call back, can they leave a voicemail: [] Yes [] No    Payton Rodriguez Rep   02/04/25 10:43 EST     
Will give PO trial, check UA, and reevaluate. If any further clinical changes, will do more tests.

## 2025-02-05 RX ORDER — HYDROCODONE BITARTRATE AND ACETAMINOPHEN 7.5; 325 MG/1; MG/1
1 TABLET ORAL 2 TIMES DAILY PRN
Qty: 60 TABLET | Refills: 0 | Status: SHIPPED | OUTPATIENT
Start: 2025-02-05

## 2025-02-06 ENCOUNTER — OUTSIDE FACILITY SERVICE (OUTPATIENT)
Dept: GASTROENTEROLOGY | Facility: CLINIC | Age: 65
End: 2025-02-06
Payer: MEDICARE

## 2025-02-06 PROCEDURE — G0105 COLORECTAL SCRN; HI RISK IND: HCPCS | Performed by: INTERNAL MEDICINE

## 2025-02-26 ENCOUNTER — TRANSCRIBE ORDERS (OUTPATIENT)
Dept: INTERNAL MEDICINE | Facility: CLINIC | Age: 65
End: 2025-02-26
Payer: MEDICARE

## 2025-02-26 DIAGNOSIS — Z12.31 ENCOUNTER FOR SCREENING MAMMOGRAM FOR BREAST CANCER: Primary | ICD-10-CM

## 2025-03-05 ENCOUNTER — TELEPHONE (OUTPATIENT)
Dept: INTERNAL MEDICINE | Facility: CLINIC | Age: 65
End: 2025-03-05
Payer: MEDICARE

## 2025-03-05 NOTE — TELEPHONE ENCOUNTER
SHE NEEDS HER PAIN MEDS FOR HER KNEE. [facility] [place] [phone]

## 2025-03-13 DIAGNOSIS — M25.562 CHRONIC PAIN OF LEFT KNEE: ICD-10-CM

## 2025-03-13 DIAGNOSIS — G89.29 CHRONIC PAIN OF LEFT KNEE: ICD-10-CM

## 2025-03-13 RX ORDER — HYDROCODONE BITARTRATE AND ACETAMINOPHEN 7.5; 325 MG/1; MG/1
1 TABLET ORAL 2 TIMES DAILY PRN
Qty: 60 TABLET | Refills: 0 | Status: SHIPPED | OUTPATIENT
Start: 2025-03-13

## 2025-03-13 NOTE — TELEPHONE ENCOUNTER
Patient is calling in about her prescription refill for hydrocodone that she called about on 03/05/2025.  She is requesting that be sent in and someone to give her a phone call about this matter

## 2025-03-20 ENCOUNTER — OFFICE VISIT (OUTPATIENT)
Dept: ORTHOPEDIC SURGERY | Facility: CLINIC | Age: 65
End: 2025-03-20
Payer: MEDICARE

## 2025-03-20 VITALS
HEIGHT: 61 IN | BODY MASS INDEX: 44.25 KG/M2 | SYSTOLIC BLOOD PRESSURE: 134 MMHG | WEIGHT: 234.4 LBS | DIASTOLIC BLOOD PRESSURE: 76 MMHG

## 2025-03-20 DIAGNOSIS — M17.0 PRIMARY OSTEOARTHRITIS OF BOTH KNEES: Primary | ICD-10-CM

## 2025-03-20 DIAGNOSIS — E66.01 CLASS 3 SEVERE OBESITY DUE TO EXCESS CALORIES WITHOUT SERIOUS COMORBIDITY WITH BODY MASS INDEX (BMI) OF 40.0 TO 44.9 IN ADULT: ICD-10-CM

## 2025-03-20 DIAGNOSIS — E66.813 CLASS 3 SEVERE OBESITY DUE TO EXCESS CALORIES WITHOUT SERIOUS COMORBIDITY WITH BODY MASS INDEX (BMI) OF 40.0 TO 44.9 IN ADULT: ICD-10-CM

## 2025-03-20 RX ORDER — LIDOCAINE HYDROCHLORIDE 10 MG/ML
3 INJECTION, SOLUTION EPIDURAL; INFILTRATION; INTRACAUDAL; PERINEURAL
Status: COMPLETED | OUTPATIENT
Start: 2025-03-20 | End: 2025-03-20

## 2025-03-20 RX ORDER — TRIAMCINOLONE ACETONIDE 40 MG/ML
80 INJECTION, SUSPENSION INTRA-ARTICULAR; INTRAMUSCULAR
Status: COMPLETED | OUTPATIENT
Start: 2025-03-20 | End: 2025-03-20

## 2025-03-20 RX ORDER — BUPIVACAINE HYDROCHLORIDE 2.5 MG/ML
3 INJECTION, SOLUTION EPIDURAL; INFILTRATION; INTRACAUDAL
Status: COMPLETED | OUTPATIENT
Start: 2025-03-20 | End: 2025-03-20

## 2025-03-20 RX ADMIN — TRIAMCINOLONE ACETONIDE 80 MG: 40 INJECTION, SUSPENSION INTRA-ARTICULAR; INTRAMUSCULAR at 11:44

## 2025-03-20 RX ADMIN — LIDOCAINE HYDROCHLORIDE 3 ML: 10 INJECTION, SOLUTION EPIDURAL; INFILTRATION; INTRACAUDAL; PERINEURAL at 11:44

## 2025-03-20 RX ADMIN — BUPIVACAINE HYDROCHLORIDE 3 ML: 2.5 INJECTION, SOLUTION EPIDURAL; INFILTRATION; INTRACAUDAL at 11:44

## 2025-03-20 NOTE — PROGRESS NOTES
Procedure   - Large Joint Arthrocentesis: bilateral knee on 3/20/2025 11:44 AM  Indications: pain  Details: 21 G needle, superolateral approach  Medications (Right): 80 mg triamcinolone acetonide 40 MG/ML; 3 mL lidocaine PF 1% 1 %; 3 mL bupivacaine (PF) 0.25 %  Medications (Left): 80 mg triamcinolone acetonide 40 MG/ML; 3 mL lidocaine PF 1% 1 %; 3 mL bupivacaine (PF) 0.25 %  Outcome: tolerated well, no immediate complications  Procedure, treatment alternatives, risks and benefits explained, specific risks discussed. Consent was given by the patient. Immediately prior to procedure a time out was called to verify the correct patient, procedure, equipment, support staff and site/side marked as required. Patient was prepped and draped in the usual sterile fashion.

## 2025-03-20 NOTE — PROGRESS NOTES
Orthopaedic Clinic Note: Knee Established Patient    Chief Complaint   Patient presents with    Follow-up     3 month follow up--primary osteoarthritis of bilateral knees         HPI    It has been 3  month(s) since Ms. Ambrocio's last visit. She returns to clinic today for follow-up bilateral knee osteoarthritis.  Patient was last seen 3 months ago and underwent intra-articular injections in both knees.  The injections provided. She rates her pain a 9/10 on the pain scale and is currently taking Norco for pain. She is ambulating with a cane. She has not attempted therapy.  She denies fevers, chills, or constitutional symptoms.  Overall, she is doing worse.  She remains overweight the BMI of 44.31.    I have reviewed the following portions of the patient's history:History of Present Illness    Past Medical History:   Diagnosis Date    Arthritis     bilateral hands    Coronary artery disease     Diabetes mellitus     Heart failure     Migraines       Past Surgical History:   Procedure Laterality Date    CORONARY ARTERY BYPASS GRAFT  02/01/2018    4 vessel    FOOT SURGERY Left 03/15/2012      Family History   Problem Relation Age of Onset    Cancer Mother         Unsure what kind    Diabetes Mother     No Known Problems Father     Diabetes Sister     Cancer Sister         Unsure what kind    Breast cancer Sister 63    Coronary artery disease Brother     No Known Problems Maternal Aunt     No Known Problems Maternal Uncle     No Known Problems Paternal Aunt     No Known Problems Paternal Uncle     No Known Problems Maternal Grandmother     No Known Problems Maternal Grandfather     No Known Problems Paternal Grandmother     No Known Problems Paternal Grandfather     Diabetes Sister     No Known Problems Daughter     Diabetes Son     No Known Problems Son     Ovarian cancer Neg Hx      Social History     Socioeconomic History    Marital status: Single    Number of children: 3   Tobacco Use    Smoking status: Former      "Current packs/day: 0.00     Average packs/day: 0.5 packs/day for 35.0 years (17.5 ttl pk-yrs)     Types: Cigarettes     Start date:      Quit date:      Years since quittin.2     Passive exposure: Past    Smokeless tobacco: Never    Tobacco comments:     \"1 pack could last 2 weeks\"   Vaping Use    Vaping status: Never Used   Substance and Sexual Activity    Alcohol use: No    Drug use: No    Sexual activity: Not Currently     Partners: Male      Current Outpatient Medications on File Prior to Visit   Medication Sig Dispense Refill    ACCU-CHEK SOFTCLIX LANCETS lancets USE TO TEST BLOOD GLUCOSE THREE TIMES DAILY AS DIRECTED  5    Aspirin 81 MG capsule Take 1 tablet by mouth Daily. 90 capsule 3    B-D UF III MINI PEN NEEDLES 31G X 5 MM misc Use with insulin twice a day 60 each 11    B-D ULTRAFINE III SHORT PEN 31G X 8 MM misc Inject 1 unit marking on U-100 syringe as directed 2 (Two) Times a Day. 90 each 2    Blood Glucose Monitoring Suppl (ACCU-CHEK MARIO PLUS) w/Device kit use to test blood glucose three times daily  5    Cholecalciferol (Vitamin D) 50 MCG (2000 UT) tablet Take 1 tablet by mouth Daily. 90 tablet 3    Cyanocobalamin (B-12) 1000 MCG tablet Take 1 tablet by mouth Daily. 90 tablet 3    empagliflozin (Jardiance) 25 MG tablet tablet Take 1 tablet by mouth Daily. 90 tablet 1    gabapentin (NEURONTIN) 300 MG capsule Take 1 capsule by mouth At Night As Needed (leg pain). 30 capsule 2    glucose blood (OneTouch Verio) test strip TEST THREE TIMES A DAY E11.9 100 each 1    HYDROcodone-acetaminophen (NORCO) 7.5-325 MG per tablet Take 1 tablet by mouth 2 (Two) Times a Day As Needed for Moderate Pain. 60 tablet 0    Insulin Lispro Prot & Lispro (HumaLOG Mix 75/25 KwikPen) (75-25) 100 UNIT/ML suspension pen-injector pen Inject 20 Units under the skin into the appropriate area as directed 2 (Two) Times a Day. 5 pen 3    isosorbide mononitrate (IMDUR) 60 MG 24 hr tablet Take 1 tablet by mouth Every " Morning. 90 tablet 2    lisinopril (PRINIVIL,ZESTRIL) 2.5 MG tablet Take 1 tablet by mouth Daily. 90 tablet 3    methocarbamol (ROBAXIN) 500 MG tablet TAKE 1 TABLET BY MOUTH 3 TIMES A DAY AS NEEDED FOR MUSCLE SPASMS. 90 tablet 3    metoprolol tartrate (LOPRESSOR) 50 MG tablet Take 1 tablet by mouth 2 (Two) Times a Day. 180 tablet 2    multivitamin (Multiple Vitamin) tablet tablet Take 1 tablet by mouth Daily. 90 tablet 3    nystatin (MYCOSTATIN) 370611 UNIT/GM cream Apply 1 Application topically to the appropriate area as directed 2 (Two) Times a Day As Needed (rash). 30 g 2    pantoprazole (PROTONIX) 40 MG EC tablet TAKE 1 TABLET BY MOUTH EVERY DAY 90 tablet 3    QUEtiapine (SEROquel) 100 MG tablet Take 1 tablet by mouth Every Night. 90 tablet 1    rosuvastatin (CRESTOR) 40 MG tablet Take 1 tablet by mouth Daily. 200001 30 tablet 5    sertraline (Zoloft) 100 MG tablet Take 1 tablet by mouth Daily. 90 tablet 1    sertraline (ZOLOFT) 50 MG tablet TAKE 1 TABLET BY MOUTH EVERY DAY 30 tablet 2    sodium-potassium-magnesium sulfates (Suprep Bowel Prep Kit) 17.5-3.13-1.6 GM/177ML solution oral solution Take 1 bottle by mouth Take As Directed. Follow instructions that were mailed to your home. If you didn't receive these call [phone]. 354 mL 0    sodium-potassium-magnesium sulfates (Suprep Bowel Prep Kit) 17.5-3.13-1.6 GM/177ML solution oral solution Take 1 bottle by mouth Take As Directed. 354 mL 0    sodium-potassium-magnesium sulfates (Suprep Bowel Prep Kit) 17.5-3.13-1.6 GM/177ML solution oral solution Take 1 bottle by mouth Take As Directed for 1 dose. 354 mL 0    Specialty Vitamins Products (HEALTHY HEART PO) Take 400 unit marking on U-100 syringe by mouth Daily.      Tirzepatide 5 MG/0.5ML solution auto-injector Inject 5 mg under the skin into the appropriate area as directed.      traZODone (DESYREL) 150 MG tablet Take 1 tablet by mouth Every Night. 90 tablet 1    [DISCONTINUED] Dulaglutide (Trulicity) 1.5  "MG/0.5ML solution auto-injector Inject 1.5 mg under the skin into the appropriate area as directed 1 (One) Time Per Week. 2 mL 2     No current facility-administered medications on file prior to visit.      No Known Allergies     Review of Systems   Constitutional: Negative.    HENT: Negative.     Eyes: Negative.    Respiratory: Negative.     Cardiovascular: Negative.    Gastrointestinal: Negative.    Endocrine: Negative.    Genitourinary: Negative.    Musculoskeletal:  Positive for arthralgias.   Skin: Negative.    Allergic/Immunologic: Negative.    Neurological: Negative.    Hematological: Negative.    Psychiatric/Behavioral: Negative.          The patient's Review of Systems was personally reviewed and confirmed as accurate.    Physical Exam  Blood pressure 134/76, height 154.9 cm (60.98\"), weight 106 kg (234 lb 6.4 oz), not currently breastfeeding.    Body mass index is 44.31 kg/m².    GENERAL APPEARANCE: awake, alert, oriented, in no acute distress and well developed, well nourished  LUNGS:  breathing nonlabored  EXTREMITIES: no clubbing, cyanosis  PERIPHERAL PULSES: palpable dorsalis pedis and posterior tibial pulses bilaterally.    GAIT:  Antalgic        ----------  Bilateral Knee Exam:  ----------  ALIGNMENT: moderate varus, correctible to neutral  ----------  RANGE OF MOTION:  Decreased (5 - 115 degrees) with no extensor lag  LIGAMENTOUS STABILITY:   stable to varus and valgus stress at terminal extension and 30 degrees; retensioning of the MCL is appreciated with valgus stress at 30 degrees consistent with medial compartment degeneration  ----------  STRENGTH:  KNEE FLEXION 4/5  KNEE EXTENSION  4/5  ANKLE DORSIFLEXION  5/5  ANKLE PLANTARFLEXION  5/5  ----------  PAIN WITH PALPATION:global  KNEE EFFUSION: yes, mild effusion  PAIN WITH KNEE ROM: yes  PATELLAR CREPITUS:  yes, painful and symptomatic  ----------  SENSATION TO LIGHT TOUCH:  DEEP PERONEAL/SUPERFICIAL PERONEAL/SURAL/SAPHENOUS/TIBIAL:    " intact  ----------  EDEMA:  no  ERYTHEMA:    no  WOUNDS/INCISIONS:   no  _____________________________________________________________________  _____________________________________________________________________    RADIOGRAPHIC FINDINGS:   No new imaging today    Assessment/Plan:   Diagnosis Plan   1. Primary osteoarthritis of both knees        2. Class 3 severe obesity due to excess calories without serious comorbidity with body mass index (BMI) of 40.0 to 44.9 in adult          The patient has failed conservative treatment measures and is a candidate for joint arthroplasty if she get her BMI below 40.  I discussed the joint arthroplasty surgical process as well as the recovery and rehabilitation time frame.  The patient asked several questions regarding the joint arthroplasty surgery, which were answered accordingly.  Ultimately, the patient declines surgical intervention at this time and wishes to continue with conservative treatment measures.  Alternative conservative treatment measures were discussed including bracing, therapy, topical/oral anti-inflammatories, activity modification, and weight loss.  The patient considered these treatment options and wishes to proceed with corticosteroid injection(s) today.  Therefore we will proceed with corticosteroid injection(s) today.  Follow-up 3 months for repeat evaluation with x-ray 4 views bilateral knees on return.    Patient has an elevated BMI greater than 40.  This places the patient at increased risk for perioperative complications as well as increased risk of accelerating the degenerative processes within the joint.  As a result, surgical intervention will be deferred until the patient can achieve a BMI less than 40.  In the interval, the patient has been instructed on weight loss avenues including diet, portion control, calorie restriction, low/no impact exercise, referral to weight loss management and/or bariatric surgery.  It was explained that weight loss  can improve joint pain alone by decreasing the joint reaction forces.  For every pound of weight change, the knee and hip joints see a 4 to 5 fold change in pressure.  Given these options, the patient will proceed with low calorie diet and low impact exercise.    Procedure Note:  I discussed with the patient the potential benefits of performing a therapeutic injections of the bilateral knees as well as potential risks including but not limited to infection, swelling, pain, bleeding, bruising, nerve/vessel damage, skin color changes, transient elevation in blood glucose levels, and fat atrophy. After informed consent and verifying correct patient, procedure site, and type of procedure, the areas were prepped with alcohol, ethyl chloride was used to numb the skin. Via the superolateral approach, 3 cc of 1% lidocaine, 3 cc of 0.25% Marcaine and 2 cc of 40mg/ml of Kenalog were each injected into the bilateral knees. The patient tolerated the procedures well. There were no complications. A sterile dressing was placed over each injection site.      Jordan Aguilar MD  03/20/25  11:45 EDT

## 2025-04-04 DIAGNOSIS — M25.562 CHRONIC PAIN OF LEFT KNEE: ICD-10-CM

## 2025-04-04 DIAGNOSIS — G89.29 CHRONIC PAIN OF LEFT KNEE: ICD-10-CM

## 2025-04-04 NOTE — TELEPHONE ENCOUNTER
Caller: Cristóbal Jaynaradha Arellano    Relationship: Self    Best call back number: [phone]     Requested Prescriptions:   Requested Prescriptions     Pending Prescriptions Disp Refills    HYDROcodone-acetaminophen (NORCO) 7.5-325 MG per tablet 60 tablet 0     Sig: Take 1 tablet by mouth 2 (Two) Times a Day As Needed for Moderate Pain.        Pharmacy where request should be sent: [facility] [id number] [address]  AT [facility] [address] - [phone]  - [phone] FX     Last office visit with prescribing clinician: 1/10/2025   Last telemedicine visit with prescribing clinician: Visit date not found   Next office visit with prescribing clinician: 4/11/2025     Additional details provided by patient: OUT OF MEDICATION     Does the patient have less than a 3 day supply:  [x] Yes  [] No    Would you like a call back once the refill request has been completed: [] Yes [x] No    If the office needs to give you a call back, can they leave a voicemail: [] Yes [x] No    Payton Milton Rep   04/04/25 12:18 EDT

## 2025-04-04 NOTE — TELEPHONE ENCOUNTER
Caller: Jayna Ambrocio    Relationship: Self    Best call back number: [phone]     What is the best time to reach you: ANYTIME     Who are you requesting to speak with (clinical staff, provider,  specific staff member): PROVIDER    Do you know the name of the person who called: NA    What was the call regarding: PATIENT WOULD LIKE A CALL BACK TO DISCUSS THE KNEE INJECTIONS THAT ARE NOT WORKING ANYMORE.     Is it okay if the provider responds through MyChart: NO

## 2025-04-10 RX ORDER — HYDROCODONE BITARTRATE AND ACETAMINOPHEN 7.5; 325 MG/1; MG/1
1 TABLET ORAL 2 TIMES DAILY PRN
Qty: 60 TABLET | Refills: 0 | Status: SHIPPED | OUTPATIENT
Start: 2025-04-10 | End: 2025-05-10

## 2025-04-11 ENCOUNTER — OFFICE VISIT (OUTPATIENT)
Dept: INTERNAL MEDICINE | Facility: CLINIC | Age: 65
End: 2025-04-11
Payer: MEDICARE

## 2025-04-11 VITALS
DIASTOLIC BLOOD PRESSURE: 84 MMHG | HEIGHT: 61 IN | BODY MASS INDEX: 42.33 KG/M2 | OXYGEN SATURATION: 96 % | SYSTOLIC BLOOD PRESSURE: 128 MMHG | TEMPERATURE: 97.6 F | HEART RATE: 86 BPM | WEIGHT: 224.2 LBS

## 2025-04-11 DIAGNOSIS — Z79.899 MEDICATION MANAGEMENT: ICD-10-CM

## 2025-04-11 DIAGNOSIS — G89.29 CHRONIC PAIN OF BOTH KNEES: ICD-10-CM

## 2025-04-11 DIAGNOSIS — M25.561 CHRONIC PAIN OF BOTH KNEES: ICD-10-CM

## 2025-04-11 DIAGNOSIS — M79.7 FIBROMYALGIA: ICD-10-CM

## 2025-04-11 DIAGNOSIS — E11.65 TYPE 2 DIABETES MELLITUS WITH HYPERGLYCEMIA, WITH LONG-TERM CURRENT USE OF INSULIN: Primary | ICD-10-CM

## 2025-04-11 DIAGNOSIS — M15.0 PRIMARY OSTEOARTHRITIS INVOLVING MULTIPLE JOINTS: ICD-10-CM

## 2025-04-11 DIAGNOSIS — Z79.4 TYPE 2 DIABETES MELLITUS WITH HYPERGLYCEMIA, WITH LONG-TERM CURRENT USE OF INSULIN: Primary | ICD-10-CM

## 2025-04-11 DIAGNOSIS — M25.562 CHRONIC PAIN OF BOTH KNEES: ICD-10-CM

## 2025-04-11 LAB
EXPIRATION DATE: ABNORMAL
HBA1C MFR BLD: 9.3 % (ref 4.5–5.7)
Lab: ABNORMAL

## 2025-04-11 RX ORDER — PREGABALIN 50 MG/1
50 CAPSULE ORAL 2 TIMES DAILY
Qty: 60 CAPSULE | Refills: 0 | Status: SHIPPED | OUTPATIENT
Start: 2025-04-11

## 2025-04-11 NOTE — PROGRESS NOTES
Subjective   Chief Complaint   Patient presents with    Type 2 diabetes mellitus with hyperglycemia        Jayna Ambrocio is a 64 y.o. female.    History of Present Illness  The patient presents for evaluation of bilateral knee pain and diabetes mellitus.    She experienced a fall approximately one week ago, landing on her knees and subsequently experiencing pain in both knees and the posterior aspect of her right knee. The pain persisted for approximately 20 minutes. She was able to alleviate the discomfort by taking her prescribed pain medication, which was conveniently located on her nightstand. She has not yet refilled her prescription for gabapentin, which was last filled in January 2025. She reports that the medication induces a sensation of dizziness, particularly noticeable when she wakes up at 6:00 AM, and also causes wheezing. Consequently, she has only taken one dose of the medication. She has been receiving injections every three months from Dr. Aguilar, an orthopedic specialist, but reports no significant improvement in her symptoms. She was informed about the possibility of weekly injections but is uncertain about insurance coverage for this treatment. She is considering right knee replacement surgery. Her next appointment with Dr. Aguilar is scheduled for June 2025.    She has lost some weight and has been working on it. Her blood sugar level was 8.2 when she visited her endocrinologist. She has not increased her insulin dosage. She has not taken tirzepatide for the last 2 weeks as it was not available at the pharmacy.    MEDICATIONS  Current: Hydrocodone, gabapentin, tirzepatide    I have reviewed the following portions of the patient's history and confirmed they are accurate: allergies, current medications, past family history, past medical history, past social history, past surgical history, and problem list    Review of Systems  Pertinent items are noted in HPI.     Current Outpatient  Medications on File Prior to Visit   Medication Sig    ACCU-CHEK SOFTCLIX LANCETS lancets USE TO TEST BLOOD GLUCOSE THREE TIMES DAILY AS DIRECTED    Aspirin 81 MG capsule Take 1 tablet by mouth Daily.    B-D UF III MINI PEN NEEDLES 31G X 5 MM misc Use with insulin twice a day    B-D ULTRAFINE III SHORT PEN 31G X 8 MM misc Inject 1 unit marking on U-100 syringe as directed 2 (Two) Times a Day.    Blood Glucose Monitoring Suppl (ACCU-CHEK MARIO PLUS) w/Device kit use to test blood glucose three times daily    Cholecalciferol (Vitamin D) 50 MCG (2000 UT) tablet Take 1 tablet by mouth Daily.    Cyanocobalamin (B-12) 1000 MCG tablet Take 1 tablet by mouth Daily.    empagliflozin (Jardiance) 25 MG tablet tablet Take 1 tablet by mouth Daily.    glucose blood (OneTouch Verio) test strip TEST THREE TIMES A DAY E11.9    HYDROcodone-acetaminophen (NORCO) 7.5-325 MG per tablet Take 1 tablet by mouth 2 (Two) Times a Day As Needed for Moderate Pain for up to 30 days.    Insulin Lispro Prot & Lispro (HumaLOG Mix 75/25 KwikPen) (75-25) 100 UNIT/ML suspension pen-injector pen Inject 20 Units under the skin into the appropriate area as directed 2 (Two) Times a Day.    isosorbide mononitrate (IMDUR) 60 MG 24 hr tablet Take 1 tablet by mouth Every Morning.    lisinopril (PRINIVIL,ZESTRIL) 2.5 MG tablet Take 1 tablet by mouth Daily.    methocarbamol (ROBAXIN) 500 MG tablet TAKE 1 TABLET BY MOUTH 3 TIMES A DAY AS NEEDED FOR MUSCLE SPASMS.    metoprolol tartrate (LOPRESSOR) 50 MG tablet Take 1 tablet by mouth 2 (Two) Times a Day.    multivitamin (Multiple Vitamin) tablet tablet Take 1 tablet by mouth Daily.    nystatin (MYCOSTATIN) 063491 UNIT/GM cream Apply 1 Application topically to the appropriate area as directed 2 (Two) Times a Day As Needed (rash).    pantoprazole (PROTONIX) 40 MG EC tablet TAKE 1 TABLET BY MOUTH EVERY DAY    QUEtiapine (SEROquel) 100 MG tablet Take 1 tablet by mouth Every Night.    rosuvastatin (CRESTOR) 40 MG  "tablet Take 1 tablet by mouth Daily. 200001    sertraline (Zoloft) 100 MG tablet Take 1 tablet by mouth Daily.    sertraline (ZOLOFT) 50 MG tablet TAKE 1 TABLET BY MOUTH EVERY DAY    sodium-potassium-magnesium sulfates (Suprep Bowel Prep Kit) 17.5-3.13-1.6 GM/177ML solution oral solution Take 1 bottle by mouth Take As Directed. Follow instructions that were mailed to your home. If you didn't receive these call [phone].    sodium-potassium-magnesium sulfates (Suprep Bowel Prep Kit) 17.5-3.13-1.6 GM/177ML solution oral solution Take 1 bottle by mouth Take As Directed.    sodium-potassium-magnesium sulfates (Suprep Bowel Prep Kit) 17.5-3.13-1.6 GM/177ML solution oral solution Take 1 bottle by mouth Take As Directed for 1 dose.    Specialty Vitamins Products (HEALTHY HEART PO) Take 400 unit marking on U-100 syringe by mouth Daily.    traZODone (DESYREL) 150 MG tablet Take 1 tablet by mouth Every Night.    [DISCONTINUED] gabapentin (NEURONTIN) 300 MG capsule Take 1 capsule by mouth At Night As Needed (leg pain).    [DISCONTINUED] Tirzepatide 5 MG/0.5ML solution auto-injector Inject 5 mg under the skin into the appropriate area as directed.     No current facility-administered medications on file prior to visit.       Objective   Vitals:    04/11/25 0915   BP: 128/84   BP Location: Left arm   Patient Position: Sitting   Cuff Size: Large Adult   Pulse: 86   Temp: 97.6 °F (36.4 °C)   SpO2: 96%   Weight: 102 kg (224 lb 3.2 oz)   Height: 154.9 cm (60.98\")     Body mass index is 42.38 kg/m².    Physical Exam  Vitals reviewed.   Constitutional:       Appearance: Normal appearance. She is well-developed.   HENT:      Head: Normocephalic and atraumatic.      Nose: Nose normal.   Eyes:      General: Lids are normal.      Conjunctiva/sclera: Conjunctivae normal.      Pupils: Pupils are equal, round, and reactive to light.   Neck:      Thyroid: No thyromegaly.      Trachea: Trachea normal.   Cardiovascular:      Rate and " Rhythm: Normal rate and regular rhythm.      Heart sounds: Normal heart sounds.   Pulmonary:      Effort: Pulmonary effort is normal. No respiratory distress.      Breath sounds: Normal breath sounds.   Musculoskeletal:      Right knee: Decreased range of motion. Tenderness present.      Left knee: Decreased range of motion. Tenderness present.   Skin:     General: Skin is warm and dry.   Neurological:      Mental Status: She is alert and oriented to person, place, and time.      GCS: GCS eye subscore is 4. GCS verbal subscore is 5. GCS motor subscore is 6.   Psychiatric:         Attention and Perception: Attention normal.         Mood and Affect: Mood normal.         Speech: Speech normal.         Behavior: Behavior normal. Behavior is cooperative.         Thought Content: Thought content normal.         Results  Laboratory Studies  A1c is 9.3.  Lab Results   Component Value Date    WBC 6.46 01/10/2025    HGB 13.8 01/10/2025    HCT 42.2 01/10/2025    MCV 89.0 01/10/2025     01/10/2025      Lab Results   Component Value Date    GLUCOSE 85 01/10/2025    BUN 24 (H) 01/10/2025    CREATININE 1.58 (H) 01/10/2025     01/10/2025    K 4.3 01/10/2025     01/10/2025    CALCIUM 9.7 01/10/2025    PROTEINTOT 6.9 01/10/2025    ALBUMIN 3.8 01/10/2025    ALT 39 (H) 01/10/2025    AST 26 01/10/2025    ALKPHOS 69 01/10/2025    BILITOT 0.3 01/10/2025    GLOB 3.1 01/10/2025    AGRATIO 1.2 01/10/2025    BCR 15.2 01/10/2025    ANIONGAP 11.2 01/10/2025    EGFR 36.2 (L) 01/10/2025      Lab Results   Component Value Date    HGBA1C 9.4 (A) 01/10/2025      Lab Results   Component Value Date    CHOL 190 01/10/2025    TRIG 77 01/10/2025    HDL 79 (H) 01/10/2025    LDL 97 01/10/2025      Lab Results   Component Value Date    TSH 1.700 05/23/2024          Assessment & Plan   Problem List Items Addressed This Visit       Type 2 diabetes mellitus - Primary    Relevant Medications    Tirzepatide 5 MG/0.5ML solution auto-injector     Other Relevant Orders    POC Glycosylated Hemoglobin (Hb A1C)     Other Visit Diagnoses         Fibromyalgia        Relevant Medications    pregabalin (Lyrica) 50 MG capsule               Current Outpatient Medications:     Tirzepatide 5 MG/0.5ML solution auto-injector, Inject 5 mg under the skin into the appropriate area as directed 1 (One) Time Per Week., Disp: 2 mL, Rfl: 2    ACCU-CHEK SOFTCLIX LANCETS lancets, USE TO TEST BLOOD GLUCOSE THREE TIMES DAILY AS DIRECTED, Disp: , Rfl: 5    Aspirin 81 MG capsule, Take 1 tablet by mouth Daily., Disp: 90 capsule, Rfl: 3    B-D UF III MINI PEN NEEDLES 31G X 5 MM misc, Use with insulin twice a day, Disp: 60 each, Rfl: 11    B-D ULTRAFINE III SHORT PEN 31G X 8 MM misc, Inject 1 unit marking on U-100 syringe as directed 2 (Two) Times a Day., Disp: 90 each, Rfl: 2    Blood Glucose Monitoring Suppl (ACCU-CHEK MARIO PLUS) w/Device kit, use to test blood glucose three times daily, Disp: , Rfl: 5    Cholecalciferol (Vitamin D) 50 MCG (2000 UT) tablet, Take 1 tablet by mouth Daily., Disp: 90 tablet, Rfl: 3    Cyanocobalamin (B-12) 1000 MCG tablet, Take 1 tablet by mouth Daily., Disp: 90 tablet, Rfl: 3    empagliflozin (Jardiance) 25 MG tablet tablet, Take 1 tablet by mouth Daily., Disp: 90 tablet, Rfl: 1    glucose blood (OneTouch Verio) test strip, TEST THREE TIMES A DAY E11.9, Disp: 100 each, Rfl: 1    HYDROcodone-acetaminophen (NORCO) 7.5-325 MG per tablet, Take 1 tablet by mouth 2 (Two) Times a Day As Needed for Moderate Pain for up to 30 days., Disp: 60 tablet, Rfl: 0    Insulin Lispro Prot & Lispro (HumaLOG Mix 75/25 KwikPen) (75-25) 100 UNIT/ML suspension pen-injector pen, Inject 20 Units under the skin into the appropriate area as directed 2 (Two) Times a Day., Disp: 5 pen, Rfl: 3    isosorbide mononitrate (IMDUR) 60 MG 24 hr tablet, Take 1 tablet by mouth Every Morning., Disp: 90 tablet, Rfl: 2    lisinopril (PRINIVIL,ZESTRIL) 2.5 MG tablet, Take 1 tablet by mouth Daily.,  Disp: 90 tablet, Rfl: 3    methocarbamol (ROBAXIN) 500 MG tablet, TAKE 1 TABLET BY MOUTH 3 TIMES A DAY AS NEEDED FOR MUSCLE SPASMS., Disp: 90 tablet, Rfl: 3    metoprolol tartrate (LOPRESSOR) 50 MG tablet, Take 1 tablet by mouth 2 (Two) Times a Day., Disp: 180 tablet, Rfl: 2    multivitamin (Multiple Vitamin) tablet tablet, Take 1 tablet by mouth Daily., Disp: 90 tablet, Rfl: 3    nystatin (MYCOSTATIN) 459366 UNIT/GM cream, Apply 1 Application topically to the appropriate area as directed 2 (Two) Times a Day As Needed (rash)., Disp: 30 g, Rfl: 2    pantoprazole (PROTONIX) 40 MG EC tablet, TAKE 1 TABLET BY MOUTH EVERY DAY, Disp: 90 tablet, Rfl: 3    pregabalin (Lyrica) 50 MG capsule, Take 1 capsule by mouth 2 (Two) Times a Day., Disp: 60 capsule, Rfl: 0    QUEtiapine (SEROquel) 100 MG tablet, Take 1 tablet by mouth Every Night., Disp: 90 tablet, Rfl: 1    rosuvastatin (CRESTOR) 40 MG tablet, Take 1 tablet by mouth Daily. 200001, Disp: 30 tablet, Rfl: 5    sertraline (Zoloft) 100 MG tablet, Take 1 tablet by mouth Daily., Disp: 90 tablet, Rfl: 1    sertraline (ZOLOFT) 50 MG tablet, TAKE 1 TABLET BY MOUTH EVERY DAY, Disp: 30 tablet, Rfl: 2    sodium-potassium-magnesium sulfates (Suprep Bowel Prep Kit) 17.5-3.13-1.6 GM/177ML solution oral solution, Take 1 bottle by mouth Take As Directed. Follow instructions that were mailed to your home. If you didn't receive these call [phone]., Disp: 354 mL, Rfl: 0    sodium-potassium-magnesium sulfates (Suprep Bowel Prep Kit) 17.5-3.13-1.6 GM/177ML solution oral solution, Take 1 bottle by mouth Take As Directed., Disp: 354 mL, Rfl: 0    sodium-potassium-magnesium sulfates (Suprep Bowel Prep Kit) 17.5-3.13-1.6 GM/177ML solution oral solution, Take 1 bottle by mouth Take As Directed for 1 dose., Disp: 354 mL, Rfl: 0    Specialty Vitamins Products (HEALTHY HEART PO), Take 400 unit marking on U-100 syringe by mouth Daily., Disp: , Rfl:     traZODone (DESYREL) 150 MG tablet, Take 1  tablet by mouth Every Night., Disp: 90 tablet, Rfl: 1    Assessment & Plan  1. Bilateral knee pain.  The patient's bilateral knee pain is more severe in the right knee than the left. She reported a recent fall that exacerbated the pain. Previous 3-month injections have not been effective. A prescription for Lyrica will be provided, to be taken once daily with dinner. She is advised to take the initial dose of Lyrica without concurrent hydrocodone use to monitor for any potential side effects such as drowsiness or off-balance feelings. If tolerated, the dosage may be increased. She is also advised to  her pain medication from the pharmacy.    2. Diabetes mellitus.  Her blood sugar levels are elevated, with a recent reading of 9.3%. She has not been taking her Trulicity (tirzepatide) 5 mg once a week due to availability issues at the pharmacy. A note will be sent to her pharmacy to ensure the availability of Trulicity, as it is crucial for managing her blood sugar levels. She is advised to resume Trulicity as soon as it becomes available.         Plan of care reviewed with the patient at the conclusion of today's visit.  Education was provided regarding diagnosis, management, and any prescribed or recommended OTC medications.  Patient verbalized understanding of and agreement with management plan.     Return in about 3 months (around 7/11/2025), or if symptoms worsen or fail to improve.      Transcribed from ambient dictation for ROCCO Monroy by ROCCO Monroy.  04/11/25   09:50 EDT    Patient or patient representative verbalized consent for the use of Ambient Listening during the visit with  ROCCO Monroy for chart documentation. 4/11/2025  09:50 EDT

## 2025-04-18 LAB — DRUGS UR: NORMAL

## 2025-05-12 RX ORDER — TRAZODONE HYDROCHLORIDE 150 MG/1
150 TABLET ORAL NIGHTLY
Qty: 90 TABLET | Refills: 1 | Status: SHIPPED | OUTPATIENT
Start: 2025-05-12

## 2025-05-13 ENCOUNTER — TELEPHONE (OUTPATIENT)
Dept: INTERNAL MEDICINE | Age: 65
End: 2025-05-13
Payer: MEDICARE

## 2025-05-13 DIAGNOSIS — M25.561 CHRONIC PAIN OF BOTH KNEES: Primary | ICD-10-CM

## 2025-05-13 DIAGNOSIS — G89.29 CHRONIC PAIN OF BOTH KNEES: Primary | ICD-10-CM

## 2025-05-13 DIAGNOSIS — M25.562 CHRONIC PAIN OF BOTH KNEES: Primary | ICD-10-CM

## 2025-05-13 DIAGNOSIS — M79.604 PAIN IN BOTH LOWER EXTREMITIES: ICD-10-CM

## 2025-05-13 DIAGNOSIS — M79.605 PAIN IN BOTH LOWER EXTREMITIES: ICD-10-CM

## 2025-05-13 RX ORDER — HYDROCODONE BITARTRATE AND ACETAMINOPHEN 7.5; 325 MG/1; MG/1
1 TABLET ORAL EVERY 6 HOURS PRN
Qty: 60 TABLET | Refills: 0 | Status: SHIPPED | OUTPATIENT
Start: 2025-05-13

## 2025-05-13 NOTE — TELEPHONE ENCOUNTER
Caller: Jayna Ambrocio    Relationship: Self    Best call back number:       [phone] (Home)     Requested Prescriptions:     HYDROCODONE/ACETAMINOPHEN - 5-325    IT WAS NOTED MEDICATION IS NOT INCLUDED ON PATIENT'S CHART     Pharmacy where request should be sent:     [facility] [id number] [place] [id number] [facility]  AT [facility] [address] - [phone] PH - [phone] FX     Last office visit with prescribing clinician: 4/11/2025   Last telemedicine visit with prescribing clinician: Visit date not found   Next office visit with prescribing clinician: 7/11/2025     Additional details provided by patient:     PATIENT STATED SHE WILL BE OUT OF MEDICATION TOMORROW A.M.    Does the patient have less than a 3 day supply:  [x] Yes  [] No    Would you like a call back once the refill request has been completed: [] Yes [] No    If the office needs to give you a call back, can they leave a voicemail: [] Yes [] No    Payton Rodriguez Rep   05/13/25 13:39 EDT

## 2025-05-14 ENCOUNTER — HOSPITAL ENCOUNTER (OUTPATIENT)
Dept: MAMMOGRAPHY | Facility: HOSPITAL | Age: 65
Discharge: HOME | End: 2025-05-14
Admitting: NURSE PRACTITIONER
Payer: MEDICARE

## 2025-05-14 DIAGNOSIS — Z12.31 ENCOUNTER FOR SCREENING MAMMOGRAM FOR BREAST CANCER: ICD-10-CM

## 2025-05-14 PROCEDURE — 77067 SCR MAMMO BI INCL CAD: CPT

## 2025-05-14 PROCEDURE — 77063 BREAST TOMOSYNTHESIS BI: CPT

## 2025-05-24 DIAGNOSIS — M25.562 CHRONIC PAIN OF LEFT KNEE: ICD-10-CM

## 2025-05-24 DIAGNOSIS — M62.838 MUSCLE SPASM: ICD-10-CM

## 2025-05-24 DIAGNOSIS — M25.512 CHRONIC LEFT SHOULDER PAIN: ICD-10-CM

## 2025-05-24 DIAGNOSIS — G89.29 CHRONIC LEFT SHOULDER PAIN: ICD-10-CM

## 2025-05-24 DIAGNOSIS — G89.29 CHRONIC PAIN OF LEFT KNEE: ICD-10-CM

## 2025-05-27 RX ORDER — METHOCARBAMOL 500 MG/1
500 TABLET, FILM COATED ORAL 3 TIMES DAILY PRN
Qty: 90 TABLET | Refills: 3 | Status: SHIPPED | OUTPATIENT
Start: 2025-05-27

## 2025-06-06 RX ORDER — QUETIAPINE FUMARATE 100 MG/1
100 TABLET, FILM COATED ORAL NIGHTLY
Qty: 90 TABLET | Refills: 3 | Status: SHIPPED | OUTPATIENT
Start: 2025-06-06

## 2025-06-10 ENCOUNTER — HOSPITAL ENCOUNTER (OUTPATIENT)
Dept: MAMMOGRAPHY | Facility: HOSPITAL | Age: 65
Discharge: HOME | End: 2025-06-10
Payer: MEDICARE

## 2025-06-10 DIAGNOSIS — Z12.31 VISIT FOR SCREENING MAMMOGRAM: ICD-10-CM

## 2025-06-12 DIAGNOSIS — M79.605 PAIN IN BOTH LOWER EXTREMITIES: ICD-10-CM

## 2025-06-12 DIAGNOSIS — M25.562 CHRONIC PAIN OF BOTH KNEES: ICD-10-CM

## 2025-06-12 DIAGNOSIS — M79.604 PAIN IN BOTH LOWER EXTREMITIES: ICD-10-CM

## 2025-06-12 DIAGNOSIS — M25.561 CHRONIC PAIN OF BOTH KNEES: ICD-10-CM

## 2025-06-12 DIAGNOSIS — G89.29 CHRONIC PAIN OF BOTH KNEES: ICD-10-CM

## 2025-06-12 RX ORDER — QUETIAPINE FUMARATE 100 MG/1
100 TABLET, FILM COATED ORAL NIGHTLY
Qty: 90 TABLET | Refills: 3 | Status: SHIPPED | OUTPATIENT
Start: 2025-06-12

## 2025-06-13 RX ORDER — HYDROCODONE BITARTRATE AND ACETAMINOPHEN 7.5; 325 MG/1; MG/1
1 TABLET ORAL EVERY 6 HOURS PRN
Qty: 60 TABLET | Refills: 0 | Status: SHIPPED | OUTPATIENT
Start: 2025-06-13

## 2025-06-20 ENCOUNTER — OFFICE VISIT (OUTPATIENT)
Dept: ORTHOPEDIC SURGERY | Facility: CLINIC | Age: 65
End: 2025-06-20
Payer: MEDICARE

## 2025-06-20 VITALS
DIASTOLIC BLOOD PRESSURE: 80 MMHG | SYSTOLIC BLOOD PRESSURE: 126 MMHG | WEIGHT: 234.4 LBS | HEIGHT: 61 IN | BODY MASS INDEX: 44.25 KG/M2

## 2025-06-20 DIAGNOSIS — E66.01 CLASS 3 SEVERE OBESITY DUE TO EXCESS CALORIES WITHOUT SERIOUS COMORBIDITY WITH BODY MASS INDEX (BMI) OF 40.0 TO 44.9 IN ADULT: ICD-10-CM

## 2025-06-20 DIAGNOSIS — E66.813 CLASS 3 SEVERE OBESITY DUE TO EXCESS CALORIES WITHOUT SERIOUS COMORBIDITY WITH BODY MASS INDEX (BMI) OF 40.0 TO 44.9 IN ADULT: ICD-10-CM

## 2025-06-20 DIAGNOSIS — M17.0 PRIMARY OSTEOARTHRITIS OF BOTH KNEES: Primary | ICD-10-CM

## 2025-06-20 PROCEDURE — 3079F DIAST BP 80-89 MM HG: CPT | Performed by: ORTHOPAEDIC SURGERY

## 2025-06-20 PROCEDURE — 1160F RVW MEDS BY RX/DR IN RCRD: CPT | Performed by: ORTHOPAEDIC SURGERY

## 2025-06-20 PROCEDURE — 3074F SYST BP LT 130 MM HG: CPT | Performed by: ORTHOPAEDIC SURGERY

## 2025-06-20 PROCEDURE — 1159F MED LIST DOCD IN RCRD: CPT | Performed by: ORTHOPAEDIC SURGERY

## 2025-06-20 RX ORDER — LIDOCAINE HYDROCHLORIDE 10 MG/ML
3 INJECTION, SOLUTION EPIDURAL; INFILTRATION; INTRACAUDAL; PERINEURAL
Status: COMPLETED | OUTPATIENT
Start: 2025-06-20 | End: 2025-06-20

## 2025-06-20 RX ORDER — BUPIVACAINE HYDROCHLORIDE 2.5 MG/ML
3 INJECTION, SOLUTION EPIDURAL; INFILTRATION; INTRACAUDAL; PERINEURAL
Status: COMPLETED | OUTPATIENT
Start: 2025-06-20 | End: 2025-06-20

## 2025-06-20 RX ORDER — TRIAMCINOLONE ACETONIDE 40 MG/ML
80 INJECTION, SUSPENSION INTRA-ARTICULAR; INTRAMUSCULAR
Status: COMPLETED | OUTPATIENT
Start: 2025-06-20 | End: 2025-06-20

## 2025-06-20 RX ADMIN — LIDOCAINE HYDROCHLORIDE 3 ML: 10 INJECTION, SOLUTION EPIDURAL; INFILTRATION; INTRACAUDAL; PERINEURAL at 11:51

## 2025-06-20 RX ADMIN — BUPIVACAINE HYDROCHLORIDE 3 ML: 2.5 INJECTION, SOLUTION EPIDURAL; INFILTRATION; INTRACAUDAL; PERINEURAL at 11:51

## 2025-06-20 RX ADMIN — TRIAMCINOLONE ACETONIDE 80 MG: 40 INJECTION, SUSPENSION INTRA-ARTICULAR; INTRAMUSCULAR at 11:51

## 2025-06-20 NOTE — PROGRESS NOTES
Orthopaedic Clinic Note: Knee Established Patient    Chief Complaint   Patient presents with    Follow-up     3 month follow up--primary osteoarthritis of bilateral knees         HPI    It has been 3  month(s) since Ms. Ambrocio's last visit. She returns to clinic today for follow-up bilateral knee osteoarthritis.  Patient rumen cortisone injections of both knees 3 months ago.  The injections lasted about 3 weeks.  Her pain has gradually returned.. She rates her pain a 9/10 on the pain scale and is currently taking nothing for pain. She is ambulating with a cane. She has not attempted therapy.  She denies fevers, chills, or constitutional symptoms.  Overall, she is doing worse.  She remains overweight with a BMI of 44.31.    Past Medical History:   Diagnosis Date    Arthritis     bilateral hands    Coronary artery disease     Diabetes mellitus     Heart failure     Migraines       Past Surgical History:   Procedure Laterality Date    CORONARY ARTERY BYPASS GRAFT  02/01/2018    4 vessel    FOOT SURGERY Left 03/15/2012      Family History   Problem Relation Age of Onset    Cancer Mother         Unsure what kind    Diabetes Mother     No Known Problems Father     Diabetes Sister     Cancer Sister         Unsure what kind    Breast cancer Sister 63    Coronary artery disease Brother     No Known Problems Maternal Aunt     No Known Problems Maternal Uncle     No Known Problems Paternal Aunt     No Known Problems Paternal Uncle     No Known Problems Maternal Grandmother     No Known Problems Maternal Grandfather     No Known Problems Paternal Grandmother     No Known Problems Paternal Grandfather     Diabetes Sister     No Known Problems Daughter     Diabetes Son     No Known Problems Son     Ovarian cancer Neg Hx      Social History     Socioeconomic History    Marital status: Single    Number of children: 3   Tobacco Use    Smoking status: Former     Current packs/day: 0.00     Average packs/day: 0.5 packs/day for 35.0  "years (17.5 ttl pk-yrs)     Types: Cigarettes     Start date:      Quit date:      Years since quittin.4     Passive exposure: Past    Smokeless tobacco: Never    Tobacco comments:     \"1 pack could last 2 weeks\"   Vaping Use    Vaping status: Never Used   Substance and Sexual Activity    Alcohol use: No    Drug use: No    Sexual activity: Not Currently     Partners: Male      Current Outpatient Medications on File Prior to Visit   Medication Sig Dispense Refill    ACCU-CHEK SOFTCLIX LANCETS lancets USE TO TEST BLOOD GLUCOSE THREE TIMES DAILY AS DIRECTED  5    Aspirin 81 MG capsule Take 1 tablet by mouth Daily. 90 capsule 3    B-D UF III MINI PEN NEEDLES 31G X 5 MM misc Use with insulin twice a day 60 each 11    B-D ULTRAFINE III SHORT PEN 31G X 8 MM misc Inject 1 unit marking on U-100 syringe as directed 2 (Two) Times a Day. 90 each 2    Blood Glucose Monitoring Suppl (ACCU-CHEK MARIO PLUS) w/Device kit use to test blood glucose three times daily  5    Cholecalciferol (Vitamin D) 50 MCG (2000 UT) tablet Take 1 tablet by mouth Daily. 90 tablet 3    Cyanocobalamin (B-12) 1000 MCG tablet Take 1 tablet by mouth Daily. 90 tablet 3    empagliflozin (Jardiance) 25 MG tablet tablet Take 1 tablet by mouth Daily. 90 tablet 1    glucose blood (OneTouch Verio) test strip TEST THREE TIMES A DAY E11.9 100 each 1    HYDROcodone-acetaminophen (NORCO) 7.5-325 MG per tablet Take 1 tablet by mouth Every 6 (Six) Hours As Needed for Moderate Pain. 60 tablet 0    Insulin Lispro Prot & Lispro (HumaLOG Mix 75/25 KwikPen) (75-25) 100 UNIT/ML suspension pen-injector pen Inject 20 Units under the skin into the appropriate area as directed 2 (Two) Times a Day. 5 pen 3    isosorbide mononitrate (IMDUR) 60 MG 24 hr tablet Take 1 tablet by mouth Every Morning. 90 tablet 2    lisinopril (PRINIVIL,ZESTRIL) 2.5 MG tablet Take 1 tablet by mouth Daily. 90 tablet 3    methocarbamol (ROBAXIN) 500 MG tablet TAKE 1 TABLET BY MOUTH 3 TIMES A " DAY AS NEEDED FOR MUSCLE SPASMS 90 tablet 3    metoprolol tartrate (LOPRESSOR) 50 MG tablet Take 1 tablet by mouth 2 (Two) Times a Day. 180 tablet 2    multivitamin (Multiple Vitamin) tablet tablet Take 1 tablet by mouth Daily. 90 tablet 3    nystatin (MYCOSTATIN) 420811 UNIT/GM cream Apply 1 Application topically to the appropriate area as directed 2 (Two) Times a Day As Needed (rash). 30 g 2    pantoprazole (PROTONIX) 40 MG EC tablet TAKE 1 TABLET BY MOUTH EVERY DAY 90 tablet 3    pregabalin (Lyrica) 50 MG capsule Take 1 capsule by mouth 2 (Two) Times a Day. 60 capsule 0    QUEtiapine (SEROquel) 100 MG tablet Take 1 tablet by mouth Every Night. 90 tablet 3    rosuvastatin (CRESTOR) 40 MG tablet Take 1 tablet by mouth Daily. 200001 30 tablet 5    sertraline (Zoloft) 100 MG tablet Take 1 tablet by mouth Daily. 90 tablet 1    sertraline (ZOLOFT) 50 MG tablet TAKE 1 TABLET BY MOUTH EVERY DAY 30 tablet 2    sodium-potassium-magnesium sulfates (Suprep Bowel Prep Kit) 17.5-3.13-1.6 GM/177ML solution oral solution Take 1 bottle by mouth Take As Directed. Follow instructions that were mailed to your home. If you didn't receive these call [phone]. 354 mL 0    sodium-potassium-magnesium sulfates (Suprep Bowel Prep Kit) 17.5-3.13-1.6 GM/177ML solution oral solution Take 1 bottle by mouth Take As Directed. 354 mL 0    sodium-potassium-magnesium sulfates (Suprep Bowel Prep Kit) 17.5-3.13-1.6 GM/177ML solution oral solution Take 1 bottle by mouth Take As Directed for 1 dose. 354 mL 0    Specialty Vitamins Products (HEALTHY HEART PO) Take 400 unit marking on U-100 syringe by mouth Daily.      Tirzepatide 5 MG/0.5ML solution auto-injector Inject 5 mg under the skin into the appropriate area as directed 1 (One) Time Per Week. 2 mL 2    traZODone (DESYREL) 150 MG tablet TAKE ONE TABLET BY MOUTH ONCE NIGHTLY 90 tablet 1     No current facility-administered medications on file prior to visit.      No Known Allergies     Review  "of Systems   Constitutional: Negative.    HENT: Negative.     Eyes: Negative.    Respiratory: Negative.     Cardiovascular: Negative.    Gastrointestinal: Negative.    Endocrine: Negative.    Genitourinary: Negative.    Musculoskeletal:  Positive for arthralgias.   Skin: Negative.    Allergic/Immunologic: Negative.    Neurological: Negative.    Hematological: Negative.    Psychiatric/Behavioral: Negative.          The patient's Review of Systems was personally reviewed and confirmed as accurate.    Physical Exam  Blood pressure 126/80, height 154.9 cm (60.98\"), weight 106 kg (234 lb 6.4 oz), not currently breastfeeding.    Body mass index is 44.31 kg/m².    GENERAL APPEARANCE: awake, alert, oriented, in no acute distress, well developed, well nourished, and obese  LUNGS:  breathing nonlabored  EXTREMITIES: no clubbing, cyanosis  PERIPHERAL PULSES: palpable dorsalis pedis and posterior tibial pulses bilaterally.    GAIT:  Antalgic        ----------  Bilateral Knee Exam:  ----------  ALIGNMENT: moderate varus, correctible to neutral  ----------  RANGE OF MOTION:  Decreased (5 - 115 degrees) with no extensor lag  LIGAMENTOUS STABILITY:   stable to varus and valgus stress at terminal extension and 30 degrees; retensioning of the MCL is appreciated with valgus stress at 30 degrees consistent with medial compartment degeneration  ----------  STRENGTH:  KNEE FLEXION 4/5  KNEE EXTENSION  4/5  ANKLE DORSIFLEXION  5/5  ANKLE PLANTARFLEXION  5/5  ----------  PAIN WITH PALPATION:global  KNEE EFFUSION: yes, mild effusion  PAIN WITH KNEE ROM: yes  PATELLAR CREPITUS:  yes, painful and symptomatic  ----------  SENSATION TO LIGHT TOUCH:  DEEP PERONEAL/SUPERFICIAL PERONEAL/SURAL/SAPHENOUS/TIBIAL:    intact  ----------  EDEMA:  no  ERYTHEMA:    no  WOUNDS/INCISIONS:   no  _____________________________________________________________________  _____________________________________________________________________    RADIOGRAPHIC " FINDINGS:   Indication: Bilateral knee pain    Comparison: Todays xrays were compared to previous xrays from 12/19/2024    Knee films: moderate to severe tricompartmental arthritis with genu varum alignment, periarticular osteophytes visualized in all compartments and Radiographs demonstrate worsening deformity with advancing arthritic changes and wear compared to prior radiographs.    Assessment/Plan:   Diagnosis Plan   1. Primary osteoarthritis of both knees  XR Knee 4+ View Bilateral      2. Class 3 severe obesity due to excess calories without serious comorbidity with body mass index (BMI) of 40.0 to 44.9 in adult          The patient has failed conservative treatment measures and is a candidate for joint arthroplasty if she and her BMI below 40.  I discussed the joint arthroplasty surgical process as well as the recovery and rehabilitation time frame.  The patient asked several questions regarding the joint arthroplasty surgery, which were answered accordingly.  Ultimately, the patient declines surgical intervention at this time and wishes to continue with conservative treatment measures.  Alternative conservative treatment measures were discussed including bracing, therapy, topical/oral anti-inflammatories, activity modification, and weight loss.  The patient considered these treatment options and wishes to proceed with corticosteroid injection(s) today.  Therefore we will proceed with corticosteroid injection(s) today.  Follow-up in 3 months for repeat evaluation.    Patient has an elevated BMI greater than 40.  This places the patient at increased risk for perioperative complications as well as increased risk of accelerating the degenerative processes within the joint.  As a result, surgical intervention will be deferred until the patient can achieve a BMI less than 40.  In the interval, the patient has been instructed on weight loss avenues including diet, portion control, calorie restriction, low/no impact  exercise, referral to weight loss management and/or bariatric surgery.  It was explained that weight loss can improve joint pain alone by decreasing the joint reaction forces.  For every pound of weight change, the knee and hip joints see a 4 to 5 fold change in pressure.  Given these options, the patient will proceed with low calorie diet and low impact exercise.    Procedure Note:  I discussed with the patient the potential benefits of performing a therapeutic injections of the bilateral knees as well as potential risks including but not limited to infection, swelling, pain, bleeding, bruising, nerve/vessel damage, skin color changes, transient elevation in blood glucose levels, and fat atrophy. After informed consent and verifying correct patient, procedure site, and type of procedure, the areas were prepped with alcohol, ethyl chloride was used to numb the skin. Via the superolateral approach, 3 cc of 1% lidocaine, 3 cc of 0.25% Marcaine and 2 cc of 40mg/ml of Kenalog were each injected into the bilateral knees. The patient tolerated the procedures well. There were no complications. A sterile dressing was placed over each injection site.      Jordan Aguilar MD  06/20/25  11:50 EDT

## 2025-07-03 DIAGNOSIS — Z79.4 TYPE 2 DIABETES MELLITUS WITH HYPERGLYCEMIA, WITH LONG-TERM CURRENT USE OF INSULIN: ICD-10-CM

## 2025-07-03 DIAGNOSIS — E11.65 TYPE 2 DIABETES MELLITUS WITH HYPERGLYCEMIA, WITH LONG-TERM CURRENT USE OF INSULIN: ICD-10-CM

## 2025-07-03 RX ORDER — TIRZEPATIDE 5 MG/.5ML
INJECTION, SOLUTION SUBCUTANEOUS
Qty: 2 ML | Refills: 3 | Status: SHIPPED | OUTPATIENT
Start: 2025-07-03

## 2025-07-14 DIAGNOSIS — M25.561 CHRONIC PAIN OF BOTH KNEES: ICD-10-CM

## 2025-07-14 DIAGNOSIS — G89.29 CHRONIC PAIN OF BOTH KNEES: ICD-10-CM

## 2025-07-14 DIAGNOSIS — M79.604 PAIN IN BOTH LOWER EXTREMITIES: ICD-10-CM

## 2025-07-14 DIAGNOSIS — M25.562 CHRONIC PAIN OF BOTH KNEES: ICD-10-CM

## 2025-07-14 DIAGNOSIS — M79.605 PAIN IN BOTH LOWER EXTREMITIES: ICD-10-CM

## 2025-07-14 NOTE — TELEPHONE ENCOUNTER
Caller: Jayna Ambrocio Eileen    Relationship: Self    Best call back number: [phone]     Requested Prescriptions:   Requested Prescriptions     Pending Prescriptions Disp Refills    HYDROcodone-acetaminophen (NORCO) 7.5-325 MG per tablet 60 tablet 0     Sig: Take 1 tablet by mouth Every 6 (Six) Hours As Needed for Moderate Pain.        Pharmacy where request should be sent: [facility] [id number] [address]  AT [facility] [address] - [phone]  - [phone] FX     Last office visit with prescribing clinician: 4/11/2025   Last telemedicine visit with prescribing clinician: Visit date not found   Next office visit with prescribing clinician: Visit date not found         Does the patient have less than a 3 day supply:  [x] Yes  [] No    Would you like a call back once the refill request has been completed: [] Yes [x] No    If the office needs to give you a call back, can they leave a voicemail: [] Yes [x] No    Payton Craig Rep   07/14/25 10:36 EDT

## 2025-07-15 RX ORDER — HYDROCODONE BITARTRATE AND ACETAMINOPHEN 7.5; 325 MG/1; MG/1
1 TABLET ORAL 2 TIMES DAILY PRN
Qty: 60 TABLET | Refills: 0 | Status: SHIPPED | OUTPATIENT
Start: 2025-07-15 | End: 2025-08-14

## 2025-07-21 ENCOUNTER — OFFICE VISIT (OUTPATIENT)
Dept: INTERNAL MEDICINE | Age: 65
End: 2025-07-21
Payer: MEDICARE

## 2025-07-21 VITALS
WEIGHT: 234 LBS | TEMPERATURE: 97.2 F | HEIGHT: 61 IN | SYSTOLIC BLOOD PRESSURE: 132 MMHG | HEART RATE: 82 BPM | OXYGEN SATURATION: 95 % | BODY MASS INDEX: 44.18 KG/M2 | DIASTOLIC BLOOD PRESSURE: 78 MMHG

## 2025-07-21 DIAGNOSIS — Z79.4 TYPE 2 DIABETES MELLITUS WITH HYPERGLYCEMIA, WITH LONG-TERM CURRENT USE OF INSULIN: Primary | ICD-10-CM

## 2025-07-21 DIAGNOSIS — M25.561 CHRONIC PAIN OF BOTH KNEES: ICD-10-CM

## 2025-07-21 DIAGNOSIS — N18.32 STAGE 3B CHRONIC KIDNEY DISEASE: ICD-10-CM

## 2025-07-21 DIAGNOSIS — M25.562 CHRONIC PAIN OF BOTH KNEES: ICD-10-CM

## 2025-07-21 DIAGNOSIS — M79.604 PAIN IN BOTH LOWER EXTREMITIES: ICD-10-CM

## 2025-07-21 DIAGNOSIS — G89.29 CHRONIC PAIN OF BOTH KNEES: ICD-10-CM

## 2025-07-21 DIAGNOSIS — E11.65 TYPE 2 DIABETES MELLITUS WITH HYPERGLYCEMIA, WITH LONG-TERM CURRENT USE OF INSULIN: Primary | ICD-10-CM

## 2025-07-21 DIAGNOSIS — M79.605 PAIN IN BOTH LOWER EXTREMITIES: ICD-10-CM

## 2025-07-21 LAB
EXPIRATION DATE: ABNORMAL
HBA1C MFR BLD: 7.9 % (ref 4.5–5.7)
Lab: ABNORMAL

## 2025-07-21 PROCEDURE — 1160F RVW MEDS BY RX/DR IN RCRD: CPT | Performed by: NURSE PRACTITIONER

## 2025-07-21 PROCEDURE — 3051F HG A1C>EQUAL 7.0%<8.0%: CPT | Performed by: NURSE PRACTITIONER

## 2025-07-21 PROCEDURE — 99214 OFFICE O/P EST MOD 30 MIN: CPT | Performed by: NURSE PRACTITIONER

## 2025-07-21 PROCEDURE — 1159F MED LIST DOCD IN RCRD: CPT | Performed by: NURSE PRACTITIONER

## 2025-07-21 PROCEDURE — 3075F SYST BP GE 130 - 139MM HG: CPT | Performed by: NURSE PRACTITIONER

## 2025-07-21 PROCEDURE — 83036 HEMOGLOBIN GLYCOSYLATED A1C: CPT | Performed by: NURSE PRACTITIONER

## 2025-07-21 PROCEDURE — 3078F DIAST BP <80 MM HG: CPT | Performed by: NURSE PRACTITIONER

## 2025-07-21 PROCEDURE — 1125F AMNT PAIN NOTED PAIN PRSNT: CPT | Performed by: NURSE PRACTITIONER

## 2025-07-21 NOTE — PROGRESS NOTES
Subjective   Chief Complaint   Patient presents with    Type 2 diabetes mellitus with hyperglycemia        Jayna Ambrocio is a 64 y.o. female.    History of Present Illness  The patient presents for evaluation of diabetes management, weight management, neuropathy, and pain.    She reports that her blood sugar levels have improved, with her A1c decreasing from 9.3 to 7.9. She is currently on Mounjaro and insulin, administered twice daily at a dose of 20 units each time. She reports no adverse effects from Mounjaro and is open to increasing the dosage to improve blood sugar control further. The goal is to potentially reduce or eliminate the need for insulin.    She has been prescribed hydrocodone for pain management. Her orthopedic surgeon, Dr. Aguilar, informed her that she could be a candidate for knee surgery if she loses 25 pounds. She has already lost 8 pounds but finds further weight loss challenging. Her next appointment with Dr. Aguilar is scheduled for 09/2025.    She continues to experience pain, particularly at night and upon waking in the morning. Over the past month, she has noticed the pain extending to her right shoulder. She is uncertain about the effectiveness of anti-inflammatories like meloxicam or diclofenac when taken with hydrocodone due to her kidney function. She has previously consulted a nephrologist but does not recall the details. She finds some relief from Robaxin.    She has completed her mammogram and colonoscopy screenings. She also sees an ophthalmologist and wears glasses, although she recently lost them and had to get a new pair.    She is currently taking Lyrica for neuropathy pain but reports it is not providing relief. She has tried gabapentin in the past but discontinued it due to side effects, including feeling woozy and experiencing a nosebleed.    I have reviewed the following portions of the patient's history and confirmed they are accurate: allergies, current  medications, past family history, past medical history, past social history, past surgical history, and problem list    Review of Systems  Pertinent items are noted in HPI.     Current Outpatient Medications on File Prior to Visit   Medication Sig    ACCU-CHEK SOFTCLIX LANCETS lancets USE TO TEST BLOOD GLUCOSE THREE TIMES DAILY AS DIRECTED    Aspirin 81 MG capsule Take 1 tablet by mouth Daily.    B-D UF III MINI PEN NEEDLES 31G X 5 MM misc Use with insulin twice a day    B-D ULTRAFINE III SHORT PEN 31G X 8 MM misc Inject 1 unit marking on U-100 syringe as directed 2 (Two) Times a Day.    Blood Glucose Monitoring Suppl (ACCU-CHEK MARIO PLUS) w/Device kit use to test blood glucose three times daily    Cholecalciferol (Vitamin D) 50 MCG (2000 UT) tablet Take 1 tablet by mouth Daily.    Cyanocobalamin (B-12) 1000 MCG tablet Take 1 tablet by mouth Daily.    empagliflozin (Jardiance) 25 MG tablet tablet Take 1 tablet by mouth Daily.    glucose blood (OneTouch Verio) test strip TEST THREE TIMES A DAY E11.9    HYDROcodone-acetaminophen (NORCO) 7.5-325 MG per tablet Take 1 tablet by mouth 2 (Two) Times a Day As Needed for Moderate Pain (NEEDS APPT FOR FURTHER REFILLS) for up to 30 days.    Insulin Lispro Prot & Lispro (HumaLOG Mix 75/25 KwikPen) (75-25) 100 UNIT/ML suspension pen-injector pen Inject 20 Units under the skin into the appropriate area as directed 2 (Two) Times a Day.    isosorbide mononitrate (IMDUR) 60 MG 24 hr tablet Take 1 tablet by mouth Every Morning.    lisinopril (PRINIVIL,ZESTRIL) 2.5 MG tablet Take 1 tablet by mouth Daily.    methocarbamol (ROBAXIN) 500 MG tablet TAKE 1 TABLET BY MOUTH 3 TIMES A DAY AS NEEDED FOR MUSCLE SPASMS    metoprolol tartrate (LOPRESSOR) 50 MG tablet Take 1 tablet by mouth 2 (Two) Times a Day.    multivitamin (Multiple Vitamin) tablet tablet Take 1 tablet by mouth Daily.    nystatin (MYCOSTATIN) 407609 UNIT/GM cream Apply 1 Application topically to the appropriate area as  "directed 2 (Two) Times a Day As Needed (rash).    pantoprazole (PROTONIX) 40 MG EC tablet TAKE 1 TABLET BY MOUTH EVERY DAY    QUEtiapine (SEROquel) 100 MG tablet Take 1 tablet by mouth Every Night.    rosuvastatin (CRESTOR) 40 MG tablet Take 1 tablet by mouth Daily. 200001    sertraline (Zoloft) 100 MG tablet Take 1 tablet by mouth Daily.    traZODone (DESYREL) 150 MG tablet TAKE ONE TABLET BY MOUTH ONCE NIGHTLY    sertraline (ZOLOFT) 50 MG tablet TAKE 1 TABLET BY MOUTH EVERY DAY    sodium-potassium-magnesium sulfates (Suprep Bowel Prep Kit) 17.5-3.13-1.6 GM/177ML solution oral solution Take 1 bottle by mouth Take As Directed. Follow instructions that were mailed to your home. If you didn't receive these call [phone]. (Patient not taking: Reported on 7/21/2025)    sodium-potassium-magnesium sulfates (Suprep Bowel Prep Kit) 17.5-3.13-1.6 GM/177ML solution oral solution Take 1 bottle by mouth Take As Directed. (Patient not taking: Reported on 7/21/2025)    sodium-potassium-magnesium sulfates (Suprep Bowel Prep Kit) 17.5-3.13-1.6 GM/177ML solution oral solution Take 1 bottle by mouth Take As Directed for 1 dose.    Specialty Vitamins Products (HEALTHY HEART PO) Take 400 unit marking on U-100 syringe by mouth Daily.     No current facility-administered medications on file prior to visit.       Objective   Vitals:    07/21/25 0802   BP: 132/78   BP Location: Right arm   Patient Position: Sitting   Cuff Size: Large Adult   Pulse: 82   Temp: 97.2 °F (36.2 °C)   SpO2: 95%   Weight: 106 kg (234 lb)   Height: 154.9 cm (60.98\")     Body mass index is 44.24 kg/m².    Physical Exam  Vitals reviewed.   Constitutional:       Appearance: Normal appearance. She is well-developed.   HENT:      Head: Normocephalic and atraumatic.      Nose: Nose normal.   Eyes:      General: Lids are normal.      Conjunctiva/sclera: Conjunctivae normal.      Pupils: Pupils are equal, round, and reactive to light.   Neck:      Thyroid: No " thyromegaly.      Trachea: Trachea normal.   Cardiovascular:      Rate and Rhythm: Normal rate and regular rhythm.      Heart sounds: Normal heart sounds.   Pulmonary:      Effort: Pulmonary effort is normal. No respiratory distress.      Breath sounds: Normal breath sounds.   Musculoskeletal:      Right knee: Tenderness present.      Left knee: Tenderness present.   Skin:     General: Skin is warm and dry.   Neurological:      Mental Status: She is alert and oriented to person, place, and time.      GCS: GCS eye subscore is 4. GCS verbal subscore is 5. GCS motor subscore is 6.   Psychiatric:         Attention and Perception: Attention normal.         Mood and Affect: Mood normal.         Speech: Speech normal.         Behavior: Behavior normal. Behavior is cooperative.         Thought Content: Thought content normal.         Results  Labs   - Blood Sugar: 7.9    Lab Results   Component Value Date    WBC 6.46 01/10/2025    HGB 13.8 01/10/2025    HCT 42.2 01/10/2025    MCV 89.0 01/10/2025     01/10/2025      Lab Results   Component Value Date    GLUCOSE 85 01/10/2025    BUN 24 (H) 01/10/2025    CREATININE 1.58 (H) 01/10/2025     01/10/2025    K 4.3 01/10/2025     01/10/2025    CALCIUM 9.7 01/10/2025    PROTEINTOT 6.9 01/10/2025    ALBUMIN 3.8 01/10/2025    ALT 39 (H) 01/10/2025    AST 26 01/10/2025    ALKPHOS 69 01/10/2025    BILITOT 0.3 01/10/2025    GLOB 3.1 01/10/2025    AGRATIO 1.2 01/10/2025    BCR 15.2 01/10/2025    ANIONGAP 11.2 01/10/2025    EGFR 36.2 (L) 01/10/2025      Lab Results   Component Value Date    HGBA1C 7.9 (A) 07/21/2025      Lab Results   Component Value Date    CHOL 190 01/10/2025    TRIG 77 01/10/2025    HDL 79 (H) 01/10/2025    LDL 97 01/10/2025      Lab Results   Component Value Date    TSH 1.700 05/23/2024          Assessment & Plan   Problem List Items Addressed This Visit       Type 2 diabetes mellitus - Primary    Relevant Medications    Tirzepatide 7.5 MG/0.5ML  solution auto-injector    Other Relevant Orders    POC Glycosylated Hemoglobin (Hb A1C) (Completed)    Ambulatory Referral to Nephrology (Completed)    Stage 3b chronic kidney disease    Relevant Orders    Ambulatory Referral to Nephrology (Completed)     Other Visit Diagnoses         Chronic pain of both knees          Pain in both lower extremities                   Current Outpatient Medications:     ACCU-CHEK SOFTCLIX LANCETS lancets, USE TO TEST BLOOD GLUCOSE THREE TIMES DAILY AS DIRECTED, Disp: , Rfl: 5    Aspirin 81 MG capsule, Take 1 tablet by mouth Daily., Disp: 90 capsule, Rfl: 3    B-D UF III MINI PEN NEEDLES 31G X 5 MM misc, Use with insulin twice a day, Disp: 60 each, Rfl: 11    B-D ULTRAFINE III SHORT PEN 31G X 8 MM misc, Inject 1 unit marking on U-100 syringe as directed 2 (Two) Times a Day., Disp: 90 each, Rfl: 2    Blood Glucose Monitoring Suppl (ACCU-CHEK MARIO PLUS) w/Device kit, use to test blood glucose three times daily, Disp: , Rfl: 5    Cholecalciferol (Vitamin D) 50 MCG (2000 UT) tablet, Take 1 tablet by mouth Daily., Disp: 90 tablet, Rfl: 3    Cyanocobalamin (B-12) 1000 MCG tablet, Take 1 tablet by mouth Daily., Disp: 90 tablet, Rfl: 3    empagliflozin (Jardiance) 25 MG tablet tablet, Take 1 tablet by mouth Daily., Disp: 90 tablet, Rfl: 1    glucose blood (OneTouch Verio) test strip, TEST THREE TIMES A DAY E11.9, Disp: 100 each, Rfl: 1    HYDROcodone-acetaminophen (NORCO) 7.5-325 MG per tablet, Take 1 tablet by mouth 2 (Two) Times a Day As Needed for Moderate Pain (NEEDS APPT FOR FURTHER REFILLS) for up to 30 days., Disp: 60 tablet, Rfl: 0    Insulin Lispro Prot & Lispro (HumaLOG Mix 75/25 KwikPen) (75-25) 100 UNIT/ML suspension pen-injector pen, Inject 20 Units under the skin into the appropriate area as directed 2 (Two) Times a Day., Disp: 5 pen, Rfl: 3    isosorbide mononitrate (IMDUR) 60 MG 24 hr tablet, Take 1 tablet by mouth Every Morning., Disp: 90 tablet, Rfl: 2    lisinopril  (PRINIVIL,ZESTRIL) 2.5 MG tablet, Take 1 tablet by mouth Daily., Disp: 90 tablet, Rfl: 3    methocarbamol (ROBAXIN) 500 MG tablet, TAKE 1 TABLET BY MOUTH 3 TIMES A DAY AS NEEDED FOR MUSCLE SPASMS, Disp: 90 tablet, Rfl: 3    metoprolol tartrate (LOPRESSOR) 50 MG tablet, Take 1 tablet by mouth 2 (Two) Times a Day., Disp: 180 tablet, Rfl: 2    multivitamin (Multiple Vitamin) tablet tablet, Take 1 tablet by mouth Daily., Disp: 90 tablet, Rfl: 3    nystatin (MYCOSTATIN) 016430 UNIT/GM cream, Apply 1 Application topically to the appropriate area as directed 2 (Two) Times a Day As Needed (rash)., Disp: 30 g, Rfl: 2    pantoprazole (PROTONIX) 40 MG EC tablet, TAKE 1 TABLET BY MOUTH EVERY DAY, Disp: 90 tablet, Rfl: 3    QUEtiapine (SEROquel) 100 MG tablet, Take 1 tablet by mouth Every Night., Disp: 90 tablet, Rfl: 3    rosuvastatin (CRESTOR) 40 MG tablet, Take 1 tablet by mouth Daily. 200001, Disp: 30 tablet, Rfl: 5    sertraline (Zoloft) 100 MG tablet, Take 1 tablet by mouth Daily., Disp: 90 tablet, Rfl: 1    traZODone (DESYREL) 150 MG tablet, TAKE ONE TABLET BY MOUTH ONCE NIGHTLY, Disp: 90 tablet, Rfl: 1    sertraline (ZOLOFT) 50 MG tablet, TAKE 1 TABLET BY MOUTH EVERY DAY, Disp: 30 tablet, Rfl: 2    sodium-potassium-magnesium sulfates (Suprep Bowel Prep Kit) 17.5-3.13-1.6 GM/177ML solution oral solution, Take 1 bottle by mouth Take As Directed. Follow instructions that were mailed to your home. If you didn't receive these call [phone]. (Patient not taking: Reported on 7/21/2025), Disp: 354 mL, Rfl: 0    sodium-potassium-magnesium sulfates (Suprep Bowel Prep Kit) 17.5-3.13-1.6 GM/177ML solution oral solution, Take 1 bottle by mouth Take As Directed. (Patient not taking: Reported on 7/21/2025), Disp: 354 mL, Rfl: 0    sodium-potassium-magnesium sulfates (Suprep Bowel Prep Kit) 17.5-3.13-1.6 GM/177ML solution oral solution, Take 1 bottle by mouth Take As Directed for 1 dose., Disp: 354 mL, Rfl: 0    Specialty Vitamins  Products (HEALTHY HEART PO), Take 400 unit marking on U-100 syringe by mouth Daily., Disp: , Rfl:     Tirzepatide 7.5 MG/0.5ML solution auto-injector, Inject 7.5 mg under the skin into the appropriate area as directed 1 (One) Time Per Week., Disp: 2 mL, Rfl: 2    Assessment & Plan  1. Diabetes Mellitus.  - Blood glucose levels have improved from 9.3 to 7.9.  - Mounjaro dosage will be increased to 7.5 mg to achieve better control of blood glucose levels.  - Goal is to reduce A1c to <7, after which insulin can be gradually reduced.  - Currently on insulin 20 units twice a day; insulin may be discontinued if maximum dose of Mounjaro is reached.    2. Weight Management.  - Advised to lose weight to be eligible for knee surgery; has lost 8 pounds so far.  - Mounjaro should help decrease appetite, aiding in weight loss.  - Continued efforts towards weight loss are encouraged.  - Knee surgery consultation scheduled for 09/2025.    3. Neuropathy and knee pain.  - Reports that Lyrica (pregabalin) is not helping with neuropathy pain.  - Gabapentin was previously tried but caused significant side effects.  - Lyrica will be discontinued. Has failed tramadol and anti-inflammatories are contraindicated due to decreased kidney function.   - Hydrocodone greatly helps with discomfort.   - continue follow ups with orthopedics considering knee replacement surgery.     Patient educated on risks and side effects of taking controlled substance including risk of addiction and dependence. Advised to use this medication sparingly and/or as prescribed.  Advised to not drive or operate heavy machinery when taking controlled substances. UDS and [employer] up to date. GELY reviewed and appropriate. GEYL is updated every 3 months. I will continue to see patient for regular follow up appointments.  They are well controlled on their medication.      4. Decreased kidney function  - NSAIDS and Anti-inflammatories like meloxicam or diclofenac  are not recommended due to kidney function.  - Referral to a nephrologist will be made for annual check-ups.    Follow-up: Next scheduled visit in 3 months.         Plan of care reviewed with the patient at the conclusion of today's visit.  Education was provided regarding diagnosis, management, and any prescribed or recommended OTC medications.  Patient verbalized understanding of and agreement with management plan.     Return in about 3 months (around 10/21/2025), or if symptoms worsen or fail to improve.      Transcribed from ambient dictation for ROCCO Monroy by ROCCO Monroy.  07/21/25   08:29 EDT    Patient or patient representative verbalized consent for the use of Ambient Listening during the visit with  ROCCO Monroy for chart documentation. 7/28/2025  14:33 EDT

## 2025-07-28 ENCOUNTER — PATIENT OUTREACH (OUTPATIENT)
Dept: CASE MANAGEMENT | Facility: OTHER | Age: 65
End: 2025-07-28
Payer: MEDICARE

## 2025-07-28 DIAGNOSIS — E66.813 CLASS 3 SEVERE OBESITY DUE TO EXCESS CALORIES WITH SERIOUS COMORBIDITY AND BODY MASS INDEX (BMI) OF 40.0 TO 44.9 IN ADULT: ICD-10-CM

## 2025-07-28 DIAGNOSIS — Z79.4 TYPE 2 DIABETES MELLITUS WITH HYPERGLYCEMIA, WITH LONG-TERM CURRENT USE OF INSULIN: Primary | ICD-10-CM

## 2025-07-28 DIAGNOSIS — E11.65 TYPE 2 DIABETES MELLITUS WITH HYPERGLYCEMIA, WITH LONG-TERM CURRENT USE OF INSULIN: Primary | ICD-10-CM

## 2025-07-28 NOTE — OUTREACH NOTE
AMBULATORY CASE MANAGEMENT NOTE    Names and Relationships of Patient/Support Persons: Contact: Jayna Ambrocio; Relationship: Self -     Patient Outreach    Spoke with patient as a proactive outreach call. Discussed knee pain, patient is thinking about knee surgery. Reports she has lost 8 lbs. Discussed aquatic PT to help with mobility, reports her right should has been painful for the last 2 weeks. Patient has decided to go to the [facility] for now. She politely declined a referral for a dietician. Ambulatory  mailed diabetes information and Chronic Care Management information to patient. Discussed Chronic Care Management services, patient is agreeable to participate in the program. Ambulatory  will continue to follow.     Education Documentation  Unresolved/Worsening Symptoms, taught by Leona Valentine RN at 7/28/2025  3:07 PM.  Learner: Patient  Readiness: Acceptance  Method: Teach Back  Response: Verbalizes Understanding    Joint Mobility/Strength, taught by Leona Valentine RN at 7/28/2025  3:07 PM.  Learner: Patient  Readiness: Acceptance  Method: Teach Back  Response: Verbalizes Understanding    Signs/Symptoms, taught by Leona Valentine RN at 7/28/2025  3:07 PM.  Learner: Patient  Readiness: Acceptance  Method: Teach Back  Response: Verbalizes Understanding    Risk Factors, taught by Leona Valentine RN at 7/28/2025  3:07 PM.  Learner: Patient  Readiness: Acceptance  Method: Teach Back  Response: Verbalizes Understanding    Hyperglycemia, taught by Leona Valentine RN at 7/28/2025  3:07 PM.  Learner: Patient  Readiness: Acceptance  Method: Teach Back  Response: Verbalizes Understanding    Blood Glucose Monitoring, taught by Leona Valentine RN at 7/28/2025  3:07 PM.  Learner: Patient  Readiness: Acceptance  Method: Teach Back  Response: Verbalizes Understanding    Activity, taught by Leona Valentine RN at 7/28/2025  3:07 PM.  Learner: Patient  Readiness: Acceptance  Method: Teach Back  Response:  Verbalizes Understanding          Leona CHOWDHURY  Ambulatory Case Management    7/28/2025, 15:09 EDT

## 2025-07-31 ENCOUNTER — TELEPHONE (OUTPATIENT)
Dept: INTERNAL MEDICINE | Age: 65
End: 2025-07-31
Payer: MEDICARE

## 2025-08-11 ENCOUNTER — PATIENT OUTREACH (OUTPATIENT)
Dept: CASE MANAGEMENT | Facility: OTHER | Age: 65
End: 2025-08-11
Payer: MEDICARE

## 2025-08-11 DIAGNOSIS — M79.605 PAIN IN BOTH LOWER EXTREMITIES: ICD-10-CM

## 2025-08-11 DIAGNOSIS — M79.604 PAIN IN BOTH LOWER EXTREMITIES: ICD-10-CM

## 2025-08-11 DIAGNOSIS — G89.29 CHRONIC PAIN OF BOTH KNEES: ICD-10-CM

## 2025-08-11 DIAGNOSIS — M25.561 CHRONIC PAIN OF BOTH KNEES: ICD-10-CM

## 2025-08-11 DIAGNOSIS — M25.562 CHRONIC PAIN OF BOTH KNEES: ICD-10-CM

## 2025-08-12 RX ORDER — HYDROCODONE BITARTRATE AND ACETAMINOPHEN 7.5; 325 MG/1; MG/1
1 TABLET ORAL 2 TIMES DAILY PRN
Qty: 60 TABLET | Refills: 0 | Status: SHIPPED | OUTPATIENT
Start: 2025-08-12 | End: 2025-09-11